# Patient Record
Sex: FEMALE | NOT HISPANIC OR LATINO
[De-identification: names, ages, dates, MRNs, and addresses within clinical notes are randomized per-mention and may not be internally consistent; named-entity substitution may affect disease eponyms.]

---

## 2018-06-26 ENCOUNTER — APPOINTMENT (OUTPATIENT)
Dept: OBGYN | Facility: CLINIC | Age: 73
End: 2018-06-26

## 2021-11-28 ENCOUNTER — TRANSCRIPTION ENCOUNTER (OUTPATIENT)
Age: 76
End: 2021-11-28

## 2024-06-29 ENCOUNTER — INPATIENT (INPATIENT)
Facility: HOSPITAL | Age: 79
LOS: 11 days | Discharge: NURSING, REHAB OR LONG-TERM CARE | DRG: 66 | End: 2024-07-11
Attending: STUDENT IN AN ORGANIZED HEALTH CARE EDUCATION/TRAINING PROGRAM | Admitting: GENERAL ACUTE CARE HOSPITAL
Payer: MEDICARE

## 2024-06-29 ENCOUNTER — EMERGENCY (EMERGENCY)
Facility: HOSPITAL | Age: 79
LOS: 1 days | Discharge: ANOTHER HOSPITAL | End: 2024-06-29
Attending: STUDENT IN AN ORGANIZED HEALTH CARE EDUCATION/TRAINING PROGRAM | Admitting: STUDENT IN AN ORGANIZED HEALTH CARE EDUCATION/TRAINING PROGRAM
Payer: MEDICARE

## 2024-06-29 VITALS
HEART RATE: 109 BPM | SYSTOLIC BLOOD PRESSURE: 152 MMHG | DIASTOLIC BLOOD PRESSURE: 82 MMHG | RESPIRATION RATE: 18 BRPM | OXYGEN SATURATION: 95 %

## 2024-06-29 VITALS
OXYGEN SATURATION: 96 % | WEIGHT: 166.89 LBS | TEMPERATURE: 100 F | SYSTOLIC BLOOD PRESSURE: 116 MMHG | DIASTOLIC BLOOD PRESSURE: 83 MMHG | RESPIRATION RATE: 26 BRPM | HEART RATE: 138 BPM | HEIGHT: 62 IN

## 2024-06-29 VITALS — HEIGHT: 62 IN

## 2024-06-29 DIAGNOSIS — I25.10 ATHEROSCLEROTIC HEART DISEASE OF NATIVE CORONARY ARTERY WITHOUT ANGINA PECTORIS: ICD-10-CM

## 2024-06-29 DIAGNOSIS — I10 ESSENTIAL (PRIMARY) HYPERTENSION: ICD-10-CM

## 2024-06-29 DIAGNOSIS — R79.89 OTHER SPECIFIED ABNORMAL FINDINGS OF BLOOD CHEMISTRY: ICD-10-CM

## 2024-06-29 DIAGNOSIS — I62.9 NONTRAUMATIC INTRACRANIAL HEMORRHAGE, UNSPECIFIED: ICD-10-CM

## 2024-06-29 LAB
A1C WITH ESTIMATED AVERAGE GLUCOSE RESULT: 5.8 % — HIGH (ref 4–5.6)
ALBUMIN SERPL ELPH-MCNC: 3.6 G/DL — SIGNIFICANT CHANGE UP (ref 3.3–5)
ALBUMIN SERPL ELPH-MCNC: 3.9 G/DL — SIGNIFICANT CHANGE UP (ref 3.3–5.2)
ALP SERPL-CCNC: 108 U/L — SIGNIFICANT CHANGE UP (ref 40–120)
ALP SERPL-CCNC: 98 U/L — SIGNIFICANT CHANGE UP (ref 40–120)
ALT FLD-CCNC: 15 U/L — SIGNIFICANT CHANGE UP
ALT FLD-CCNC: 22 U/L — SIGNIFICANT CHANGE UP (ref 12–78)
AMMONIA BLD-MCNC: 16 UMOL/L — SIGNIFICANT CHANGE UP (ref 11–55)
AMMONIA BLD-MCNC: 71 UMOL/L — HIGH (ref 11–32)
ANION GAP SERPL CALC-SCNC: 12 MMOL/L — SIGNIFICANT CHANGE UP (ref 5–17)
ANION GAP SERPL CALC-SCNC: 12 MMOL/L — SIGNIFICANT CHANGE UP (ref 5–17)
APAP SERPL-MCNC: <2 UG/ML — LOW (ref 10–30)
APPEARANCE UR: CLEAR — SIGNIFICANT CHANGE UP
APTT BLD: 32.9 SEC — SIGNIFICANT CHANGE UP (ref 24.5–35.6)
APTT BLD: 33.1 SEC — SIGNIFICANT CHANGE UP (ref 24.5–35.6)
AST SERPL-CCNC: 26 U/L — SIGNIFICANT CHANGE UP (ref 15–37)
AST SERPL-CCNC: 33 U/L — HIGH
BASOPHILS # BLD AUTO: 0.04 K/UL — SIGNIFICANT CHANGE UP (ref 0–0.2)
BASOPHILS # BLD AUTO: 0.06 K/UL — SIGNIFICANT CHANGE UP (ref 0–0.2)
BASOPHILS NFR BLD AUTO: 0.3 % — SIGNIFICANT CHANGE UP (ref 0–2)
BASOPHILS NFR BLD AUTO: 0.5 % — SIGNIFICANT CHANGE UP (ref 0–2)
BILIRUB SERPL-MCNC: 0.8 MG/DL — SIGNIFICANT CHANGE UP (ref 0.2–1.2)
BILIRUB SERPL-MCNC: 0.8 MG/DL — SIGNIFICANT CHANGE UP (ref 0.4–2)
BILIRUB UR-MCNC: NEGATIVE — SIGNIFICANT CHANGE UP
BLD GP AB SCN SERPL QL: SIGNIFICANT CHANGE UP
BUN SERPL-MCNC: 10 MG/DL — SIGNIFICANT CHANGE UP (ref 7–23)
BUN SERPL-MCNC: 8.3 MG/DL — SIGNIFICANT CHANGE UP (ref 8–20)
CALCIUM SERPL-MCNC: 8.8 MG/DL — SIGNIFICANT CHANGE UP (ref 8.4–10.5)
CALCIUM SERPL-MCNC: 9.4 MG/DL — SIGNIFICANT CHANGE UP (ref 8.5–10.1)
CHLORIDE SERPL-SCNC: 102 MMOL/L — SIGNIFICANT CHANGE UP (ref 96–108)
CHLORIDE SERPL-SCNC: 106 MMOL/L — SIGNIFICANT CHANGE UP (ref 96–108)
CHOLEST SERPL-MCNC: 128 MG/DL — SIGNIFICANT CHANGE UP
CK SERPL-CCNC: 104 U/L — SIGNIFICANT CHANGE UP (ref 26–192)
CO2 SERPL-SCNC: 22 MMOL/L — SIGNIFICANT CHANGE UP (ref 22–31)
CO2 SERPL-SCNC: 23 MMOL/L — SIGNIFICANT CHANGE UP (ref 22–29)
COLOR SPEC: YELLOW — SIGNIFICANT CHANGE UP
CREAT SERPL-MCNC: 0.55 MG/DL — SIGNIFICANT CHANGE UP (ref 0.5–1.3)
CREAT SERPL-MCNC: 0.94 MG/DL — SIGNIFICANT CHANGE UP (ref 0.5–1.3)
DIFF PNL FLD: ABNORMAL
EGFR: 62 ML/MIN/1.73M2 — SIGNIFICANT CHANGE UP
EGFR: 93 ML/MIN/1.73M2 — SIGNIFICANT CHANGE UP
EOSINOPHIL # BLD AUTO: 0 K/UL — SIGNIFICANT CHANGE UP (ref 0–0.5)
EOSINOPHIL # BLD AUTO: 0.24 K/UL — SIGNIFICANT CHANGE UP (ref 0–0.5)
EOSINOPHIL NFR BLD AUTO: 0 % — SIGNIFICANT CHANGE UP (ref 0–6)
EOSINOPHIL NFR BLD AUTO: 2.1 % — SIGNIFICANT CHANGE UP (ref 0–6)
ESTIMATED AVERAGE GLUCOSE: 120 MG/DL — HIGH (ref 68–114)
ETHANOL SERPL-MCNC: <10 MG/DL — SIGNIFICANT CHANGE UP (ref 0–10)
FLUAV AG NPH QL: SIGNIFICANT CHANGE UP
FLUBV AG NPH QL: SIGNIFICANT CHANGE UP
GAS PNL BLDA: SIGNIFICANT CHANGE UP
GLUCOSE SERPL-MCNC: 140 MG/DL — HIGH (ref 70–99)
GLUCOSE SERPL-MCNC: 192 MG/DL — HIGH (ref 70–99)
GLUCOSE UR QL: NEGATIVE MG/DL — SIGNIFICANT CHANGE UP
HCT VFR BLD CALC: 40.7 % — SIGNIFICANT CHANGE UP (ref 34.5–45)
HCT VFR BLD CALC: 44.1 % — SIGNIFICANT CHANGE UP (ref 34.5–45)
HDLC SERPL-MCNC: 60 MG/DL — SIGNIFICANT CHANGE UP
HGB BLD-MCNC: 13.6 G/DL — SIGNIFICANT CHANGE UP (ref 11.5–15.5)
HGB BLD-MCNC: 14 G/DL — SIGNIFICANT CHANGE UP (ref 11.5–15.5)
IMM GRANULOCYTES NFR BLD AUTO: 0.4 % — SIGNIFICANT CHANGE UP (ref 0–0.9)
IMM GRANULOCYTES NFR BLD AUTO: 0.6 % — SIGNIFICANT CHANGE UP (ref 0–0.9)
INR BLD: 0.94 RATIO — SIGNIFICANT CHANGE UP (ref 0.85–1.18)
INR BLD: 1.07 RATIO — SIGNIFICANT CHANGE UP (ref 0.85–1.18)
KETONES UR-MCNC: NEGATIVE MG/DL — SIGNIFICANT CHANGE UP
LACTATE SERPL-SCNC: 3 MMOL/L — HIGH (ref 0.7–2)
LACTATE SERPL-SCNC: 8.8 MMOL/L — CRITICAL HIGH (ref 0.7–2)
LEUKOCYTE ESTERASE UR-ACNC: NEGATIVE — SIGNIFICANT CHANGE UP
LIDOCAIN IGE QN: 45 U/L — SIGNIFICANT CHANGE UP (ref 13–75)
LIPID PNL WITH DIRECT LDL SERPL: 47 MG/DL — SIGNIFICANT CHANGE UP
LYMPHOCYTES # BLD AUTO: 1.18 K/UL — SIGNIFICANT CHANGE UP (ref 1–3.3)
LYMPHOCYTES # BLD AUTO: 4.69 K/UL — HIGH (ref 1–3.3)
LYMPHOCYTES # BLD AUTO: 41.1 % — SIGNIFICANT CHANGE UP (ref 13–44)
LYMPHOCYTES # BLD AUTO: 8.3 % — LOW (ref 13–44)
MAGNESIUM SERPL-MCNC: 1.5 MG/DL — LOW (ref 1.6–2.6)
MAGNESIUM SERPL-MCNC: 1.9 MG/DL — SIGNIFICANT CHANGE UP (ref 1.6–2.6)
MCHC RBC-ENTMCNC: 29.2 PG — SIGNIFICANT CHANGE UP (ref 27–34)
MCHC RBC-ENTMCNC: 29.5 PG — SIGNIFICANT CHANGE UP (ref 27–34)
MCHC RBC-ENTMCNC: 31.7 GM/DL — LOW (ref 32–36)
MCHC RBC-ENTMCNC: 33.4 GM/DL — SIGNIFICANT CHANGE UP (ref 32–36)
MCV RBC AUTO: 88.3 FL — SIGNIFICANT CHANGE UP (ref 80–100)
MCV RBC AUTO: 92.1 FL — SIGNIFICANT CHANGE UP (ref 80–100)
MONOCYTES # BLD AUTO: 0.53 K/UL — SIGNIFICANT CHANGE UP (ref 0–0.9)
MONOCYTES # BLD AUTO: 0.56 K/UL — SIGNIFICANT CHANGE UP (ref 0–0.9)
MONOCYTES NFR BLD AUTO: 3.9 % — SIGNIFICANT CHANGE UP (ref 2–14)
MONOCYTES NFR BLD AUTO: 4.6 % — SIGNIFICANT CHANGE UP (ref 2–14)
NEUTROPHILS # BLD AUTO: 12.34 K/UL — HIGH (ref 1.8–7.4)
NEUTROPHILS # BLD AUTO: 5.83 K/UL — SIGNIFICANT CHANGE UP (ref 1.8–7.4)
NEUTROPHILS NFR BLD AUTO: 51.1 % — SIGNIFICANT CHANGE UP (ref 43–77)
NEUTROPHILS NFR BLD AUTO: 87.1 % — HIGH (ref 43–77)
NITRITE UR-MCNC: NEGATIVE — SIGNIFICANT CHANGE UP
NON HDL CHOLESTEROL: 68 MG/DL — SIGNIFICANT CHANGE UP
NRBC # BLD: 0 /100 WBCS — SIGNIFICANT CHANGE UP (ref 0–0)
NT-PROBNP SERPL-SCNC: 957 PG/ML — HIGH (ref 0–450)
PH UR: 5.5 — SIGNIFICANT CHANGE UP (ref 5–8)
PHOSPHATE SERPL-MCNC: 2.7 MG/DL — SIGNIFICANT CHANGE UP (ref 2.4–4.7)
PLATELET # BLD AUTO: 256 K/UL — SIGNIFICANT CHANGE UP (ref 150–400)
PLATELET # BLD AUTO: 305 K/UL — SIGNIFICANT CHANGE UP (ref 150–400)
POTASSIUM SERPL-MCNC: 3.7 MMOL/L — SIGNIFICANT CHANGE UP (ref 3.5–5.3)
POTASSIUM SERPL-MCNC: 3.9 MMOL/L — SIGNIFICANT CHANGE UP (ref 3.5–5.3)
POTASSIUM SERPL-SCNC: 3.7 MMOL/L — SIGNIFICANT CHANGE UP (ref 3.5–5.3)
POTASSIUM SERPL-SCNC: 3.9 MMOL/L — SIGNIFICANT CHANGE UP (ref 3.5–5.3)
PROT SERPL-MCNC: 7.9 G/DL — SIGNIFICANT CHANGE UP (ref 6.6–8.7)
PROT SERPL-MCNC: 8.5 G/DL — HIGH (ref 6–8.3)
PROT UR-MCNC: 300 MG/DL
PROTHROM AB SERPL-ACNC: 11 SEC — SIGNIFICANT CHANGE UP (ref 9.5–13)
PROTHROM AB SERPL-ACNC: 11.8 SEC — SIGNIFICANT CHANGE UP (ref 9.5–13)
RBC # BLD: 4.61 M/UL — SIGNIFICANT CHANGE UP (ref 3.8–5.2)
RBC # BLD: 4.79 M/UL — SIGNIFICANT CHANGE UP (ref 3.8–5.2)
RBC # FLD: 13.7 % — SIGNIFICANT CHANGE UP (ref 10.3–14.5)
RBC # FLD: 13.8 % — SIGNIFICANT CHANGE UP (ref 10.3–14.5)
RSV RNA NPH QL NAA+NON-PROBE: SIGNIFICANT CHANGE UP
SALICYLATES SERPL-MCNC: <1.7 MG/DL — LOW (ref 2.8–20)
SARS-COV-2 RNA SPEC QL NAA+PROBE: SIGNIFICANT CHANGE UP
SODIUM SERPL-SCNC: 137 MMOL/L — SIGNIFICANT CHANGE UP (ref 135–145)
SODIUM SERPL-SCNC: 140 MMOL/L — SIGNIFICANT CHANGE UP (ref 135–145)
SP GR SPEC: 1.01 — SIGNIFICANT CHANGE UP (ref 1–1.03)
TRIGL SERPL-MCNC: 105 MG/DL — SIGNIFICANT CHANGE UP
TROPONIN I, HIGH SENSITIVITY RESULT: 308.6 NG/L — HIGH
TROPONIN I, HIGH SENSITIVITY RESULT: 67.7 NG/L — HIGH
TROPONIN T, HIGH SENSITIVITY RESULT: 113 NG/L — HIGH (ref 0–51)
TROPONIN T, HIGH SENSITIVITY RESULT: 119 NG/L — HIGH (ref 0–51)
TSH SERPL-MCNC: 2.35 UIU/ML — SIGNIFICANT CHANGE UP (ref 0.36–3.74)
UROBILINOGEN FLD QL: 0.2 MG/DL — SIGNIFICANT CHANGE UP (ref 0.2–1)
WBC # BLD: 11.42 K/UL — HIGH (ref 3.8–10.5)
WBC # BLD: 14.18 K/UL — HIGH (ref 3.8–10.5)
WBC # FLD AUTO: 11.42 K/UL — HIGH (ref 3.8–10.5)
WBC # FLD AUTO: 14.18 K/UL — HIGH (ref 3.8–10.5)

## 2024-06-29 PROCEDURE — 31500 INSERT EMERGENCY AIRWAY: CPT

## 2024-06-29 PROCEDURE — 83605 ASSAY OF LACTIC ACID: CPT

## 2024-06-29 PROCEDURE — 82550 ASSAY OF CK (CPK): CPT

## 2024-06-29 PROCEDURE — 93010 ELECTROCARDIOGRAM REPORT: CPT

## 2024-06-29 PROCEDURE — 99291 CRITICAL CARE FIRST HOUR: CPT | Mod: 25

## 2024-06-29 PROCEDURE — 87637 SARSCOV2&INF A&B&RSV AMP PRB: CPT

## 2024-06-29 PROCEDURE — 70450 CT HEAD/BRAIN W/O DYE: CPT | Mod: 26,MC

## 2024-06-29 PROCEDURE — 70498 CT ANGIOGRAPHY NECK: CPT | Mod: 26

## 2024-06-29 PROCEDURE — 87086 URINE CULTURE/COLONY COUNT: CPT

## 2024-06-29 PROCEDURE — 99292 CRITICAL CARE ADDL 30 MIN: CPT | Mod: 25

## 2024-06-29 PROCEDURE — 81001 URINALYSIS AUTO W/SCOPE: CPT

## 2024-06-29 PROCEDURE — 96372 THER/PROPH/DIAG INJ SC/IM: CPT | Mod: XU

## 2024-06-29 PROCEDURE — 36415 COLL VENOUS BLD VENIPUNCTURE: CPT

## 2024-06-29 PROCEDURE — 71045 X-RAY EXAM CHEST 1 VIEW: CPT | Mod: 26,76

## 2024-06-29 PROCEDURE — 83690 ASSAY OF LIPASE: CPT

## 2024-06-29 PROCEDURE — 82140 ASSAY OF AMMONIA: CPT

## 2024-06-29 PROCEDURE — 99291 CRITICAL CARE FIRST HOUR: CPT

## 2024-06-29 PROCEDURE — 96374 THER/PROPH/DIAG INJ IV PUSH: CPT

## 2024-06-29 PROCEDURE — 93005 ELECTROCARDIOGRAM TRACING: CPT

## 2024-06-29 PROCEDURE — 84484 ASSAY OF TROPONIN QUANT: CPT

## 2024-06-29 PROCEDURE — 80307 DRUG TEST PRSMV CHEM ANLYZR: CPT

## 2024-06-29 PROCEDURE — 70450 CT HEAD/BRAIN W/O DYE: CPT | Mod: MC

## 2024-06-29 PROCEDURE — 85610 PROTHROMBIN TIME: CPT

## 2024-06-29 PROCEDURE — 87040 BLOOD CULTURE FOR BACTERIA: CPT

## 2024-06-29 PROCEDURE — 96375 TX/PRO/DX INJ NEW DRUG ADDON: CPT

## 2024-06-29 PROCEDURE — 95720 EEG PHY/QHP EA INCR W/VEEG: CPT

## 2024-06-29 PROCEDURE — 71045 X-RAY EXAM CHEST 1 VIEW: CPT | Mod: 26

## 2024-06-29 PROCEDURE — 83735 ASSAY OF MAGNESIUM: CPT

## 2024-06-29 PROCEDURE — 70450 CT HEAD/BRAIN W/O DYE: CPT | Mod: 26,59,77,MC

## 2024-06-29 PROCEDURE — 85730 THROMBOPLASTIN TIME PARTIAL: CPT

## 2024-06-29 PROCEDURE — 84443 ASSAY THYROID STIM HORMONE: CPT

## 2024-06-29 PROCEDURE — 82962 GLUCOSE BLOOD TEST: CPT

## 2024-06-29 PROCEDURE — 71045 X-RAY EXAM CHEST 1 VIEW: CPT

## 2024-06-29 PROCEDURE — 70496 CT ANGIOGRAPHY HEAD: CPT | Mod: 26

## 2024-06-29 PROCEDURE — 83880 ASSAY OF NATRIURETIC PEPTIDE: CPT

## 2024-06-29 PROCEDURE — 99222 1ST HOSP IP/OBS MODERATE 55: CPT

## 2024-06-29 PROCEDURE — 85025 COMPLETE CBC W/AUTO DIFF WBC: CPT

## 2024-06-29 PROCEDURE — 80053 COMPREHEN METABOLIC PANEL: CPT

## 2024-06-29 PROCEDURE — 96376 TX/PRO/DX INJ SAME DRUG ADON: CPT

## 2024-06-29 RX ORDER — HYDRALAZINE HYDROCHLORIDE 50 MG/1
10 TABLET ORAL
Refills: 0 | Status: DISCONTINUED | OUTPATIENT
Start: 2024-06-29 | End: 2024-07-11

## 2024-06-29 RX ORDER — SODIUM CHLORIDE 0.9 % (FLUSH) 0.9 %
1000 SYRINGE (ML) INJECTION
Refills: 0 | Status: DISCONTINUED | OUTPATIENT
Start: 2024-06-29 | End: 2024-07-01

## 2024-06-29 RX ORDER — ETOMIDATE 2 MG/ML
20 INJECTION, SOLUTION INTRAVENOUS ONCE
Refills: 0 | Status: COMPLETED | OUTPATIENT
Start: 2024-06-29 | End: 2024-06-29

## 2024-06-29 RX ORDER — LABETALOL HYDROCHLORIDE 300 MG/1
10 TABLET ORAL ONCE
Refills: 0 | Status: COMPLETED | OUTPATIENT
Start: 2024-06-29 | End: 2024-06-29

## 2024-06-29 RX ORDER — PANTOPRAZOLE SODIUM 40 MG/10ML
40 INJECTION, POWDER, FOR SOLUTION INTRAVENOUS DAILY
Refills: 0 | Status: DISCONTINUED | OUTPATIENT
Start: 2024-06-29 | End: 2024-07-01

## 2024-06-29 RX ORDER — LORAZEPAM 0.5 MG
2 TABLET ORAL ONCE
Refills: 0 | Status: DISCONTINUED | OUTPATIENT
Start: 2024-06-29 | End: 2024-06-29

## 2024-06-29 RX ORDER — SODIUM CHLORIDE 0.9 % (FLUSH) 0.9 %
500 SYRINGE (ML) INJECTION ONCE
Refills: 0 | Status: COMPLETED | OUTPATIENT
Start: 2024-06-29 | End: 2024-06-29

## 2024-06-29 RX ORDER — LEVETIRACETAM 100 MG/ML
1000 INJECTION INTRAVENOUS EVERY 12 HOURS
Refills: 0 | Status: DISCONTINUED | OUTPATIENT
Start: 2024-06-29 | End: 2024-07-01

## 2024-06-29 RX ORDER — LEVETIRACETAM 100 MG/ML
1500 INJECTION INTRAVENOUS ONCE
Refills: 0 | Status: DISCONTINUED | OUTPATIENT
Start: 2024-06-29 | End: 2024-06-29

## 2024-06-29 RX ORDER — SENNOSIDES 8.6 MG
10 TABLET ORAL AT BEDTIME
Refills: 0 | Status: DISCONTINUED | OUTPATIENT
Start: 2024-06-29 | End: 2024-06-29

## 2024-06-29 RX ORDER — LACTULOSE 10 G/15ML
200 SOLUTION ORAL ONCE
Refills: 0 | Status: COMPLETED | OUTPATIENT
Start: 2024-06-29 | End: 2024-06-29

## 2024-06-29 RX ORDER — LABETALOL HYDROCHLORIDE 300 MG/1
10 TABLET ORAL
Refills: 0 | Status: DISCONTINUED | OUTPATIENT
Start: 2024-06-29 | End: 2024-07-11

## 2024-06-29 RX ORDER — ACETAMINOPHEN 325 MG
1000 TABLET ORAL ONCE
Refills: 0 | Status: COMPLETED | OUTPATIENT
Start: 2024-06-29 | End: 2024-06-29

## 2024-06-29 RX ORDER — HALOPERIDOL DECANOATE 100 MG/ML
5 VIAL (ML) INTRAMUSCULAR ONCE
Refills: 0 | Status: COMPLETED | OUTPATIENT
Start: 2024-06-29 | End: 2024-06-29

## 2024-06-29 RX ORDER — NICARDIPINE HYDROCHLORIDE 0.1 MG/ML
5 INJECTION, SOLUTION INTRAVENOUS
Qty: 40 | Refills: 0 | Status: DISCONTINUED | OUTPATIENT
Start: 2024-06-29 | End: 2024-07-02

## 2024-06-29 RX ORDER — PROPOFOL 10 MG/ML
30 INJECTION, EMULSION INTRAVENOUS
Qty: 1000 | Refills: 0 | Status: DISCONTINUED | OUTPATIENT
Start: 2024-06-29 | End: 2024-06-30

## 2024-06-29 RX ORDER — SUCCINYLCHOLINE CHLORIDE 140 MG/7ML
100 SYRINGE (ML) INTRAVENOUS ONCE
Refills: 0 | Status: COMPLETED | OUTPATIENT
Start: 2024-06-29 | End: 2024-06-29

## 2024-06-29 RX ORDER — NICARDIPINE HYDROCHLORIDE 0.1 MG/ML
5 INJECTION, SOLUTION INTRAVENOUS
Qty: 40 | Refills: 0 | Status: DISCONTINUED | OUTPATIENT
Start: 2024-06-29 | End: 2024-06-29

## 2024-06-29 RX ORDER — DEXMEDETOMIDINE HYDROCHLORIDE 4 UG/ML
0.2 INJECTION, SOLUTION INTRAVENOUS
Qty: 200 | Refills: 0 | Status: DISCONTINUED | OUTPATIENT
Start: 2024-06-29 | End: 2024-07-01

## 2024-06-29 RX ORDER — FENTANYL CITRATE 50 UG/ML
100 INJECTION, SOLUTION INTRAMUSCULAR; INTRAVENOUS ONCE
Refills: 0 | Status: DISCONTINUED | OUTPATIENT
Start: 2024-06-29 | End: 2024-06-29

## 2024-06-29 RX ORDER — LORAZEPAM 0.5 MG
1 TABLET ORAL ONCE
Refills: 0 | Status: DISCONTINUED | OUTPATIENT
Start: 2024-06-29 | End: 2024-06-29

## 2024-06-29 RX ORDER — SENNOSIDES 8.6 MG
2 TABLET ORAL AT BEDTIME
Refills: 0 | Status: DISCONTINUED | OUTPATIENT
Start: 2024-06-29 | End: 2024-06-29

## 2024-06-29 RX ORDER — SENNOSIDES 8.6 MG
10 TABLET ORAL AT BEDTIME
Refills: 0 | Status: DISCONTINUED | OUTPATIENT
Start: 2024-06-29 | End: 2024-07-01

## 2024-06-29 RX ADMIN — Medication 15 MILLILITER(S): at 17:02

## 2024-06-29 RX ADMIN — LEVETIRACETAM 1000 MILLIGRAM(S): 100 INJECTION INTRAVENOUS at 15:56

## 2024-06-29 RX ADMIN — LABETALOL HYDROCHLORIDE 10 MILLIGRAM(S): 300 TABLET ORAL at 16:30

## 2024-06-29 RX ADMIN — Medication 1 APPLICATION(S): at 14:30

## 2024-06-29 RX ADMIN — DEXMEDETOMIDINE HYDROCHLORIDE 3.87 MICROGRAM(S)/KG/HR: 4 INJECTION, SOLUTION INTRAVENOUS at 23:30

## 2024-06-29 RX ADMIN — Medication 1 MILLIGRAM(S): at 11:07

## 2024-06-29 RX ADMIN — LABETALOL HYDROCHLORIDE 10 MILLIGRAM(S): 300 TABLET ORAL at 09:11

## 2024-06-29 RX ADMIN — Medication 100 MILLIGRAM(S): at 12:37

## 2024-06-29 RX ADMIN — FENTANYL CITRATE 100 MICROGRAM(S): 50 INJECTION, SOLUTION INTRAMUSCULAR; INTRAVENOUS at 12:53

## 2024-06-29 RX ADMIN — FENTANYL CITRATE 100 MICROGRAM(S): 50 INJECTION, SOLUTION INTRAMUSCULAR; INTRAVENOUS at 13:00

## 2024-06-29 RX ADMIN — Medication 2 MILLIGRAM(S): at 07:08

## 2024-06-29 RX ADMIN — DEXMEDETOMIDINE HYDROCHLORIDE 3.87 MICROGRAM(S)/KG/HR: 4 INJECTION, SOLUTION INTRAVENOUS at 21:14

## 2024-06-29 RX ADMIN — PROPOFOL 15.3 MICROGRAM(S)/KG/MIN: 10 INJECTION, EMULSION INTRAVENOUS at 18:51

## 2024-06-29 RX ADMIN — ETOMIDATE 20 MILLIGRAM(S): 2 INJECTION, SOLUTION INTRAVENOUS at 12:37

## 2024-06-29 RX ADMIN — Medication 1000 MILLILITER(S): at 23:57

## 2024-06-29 RX ADMIN — NICARDIPINE HYDROCHLORIDE 25 MG/HR: 0.1 INJECTION, SOLUTION INTRAVENOUS at 09:38

## 2024-06-29 RX ADMIN — NICARDIPINE HYDROCHLORIDE 25 MG/HR: 0.1 INJECTION, SOLUTION INTRAVENOUS at 12:52

## 2024-06-29 RX ADMIN — LEVETIRACETAM 1500 MILLIGRAM(S): 100 INJECTION INTRAVENOUS at 10:40

## 2024-06-29 RX ADMIN — Medication 500 MILLILITER(S): at 07:36

## 2024-06-29 RX ADMIN — Medication 5 MILLIGRAM(S): at 07:31

## 2024-06-29 RX ADMIN — Medication 10 MILLILITER(S): at 21:22

## 2024-06-29 RX ADMIN — LACTULOSE 200 GRAM(S): 10 SOLUTION ORAL at 09:38

## 2024-06-29 RX ADMIN — Medication 400 MILLIGRAM(S): at 17:59

## 2024-06-29 RX ADMIN — PROPOFOL 15.3 MICROGRAM(S)/KG/MIN: 10 INJECTION, EMULSION INTRAVENOUS at 12:52

## 2024-06-29 RX ADMIN — Medication 75 MILLILITER(S): at 16:48

## 2024-06-29 RX ADMIN — Medication 1000 MILLIGRAM(S): at 18:30

## 2024-06-29 NOTE — CONSULT NOTE ADULT - PROBLEM SELECTOR RECOMMENDATION 3
- hx of HTN   - SBP goal <160 per neurosurgery  - BP management per neurosurgery   - s/p cardene drip

## 2024-06-29 NOTE — H&P ADULT - NSHPLABSRESULTS_GEN_ALL_CORE
.  LABS:                         13.6   14.18 )-----------( 256      ( 29 Jun 2024 12:07 )             40.7     06-29    137  |  102  |  8.3  ----------------------------<  140<H>  3.9   |  23.0  |  0.55    Ca    8.8      29 Jun 2024 12:07  Mg     1.9     06-29    TPro  7.9  /  Alb  3.9  /  TBili  0.8  /  DBili  x   /  AST  33<H>  /  ALT  15  /  AlkPhos  98  06-29    PT/INR - ( 29 Jun 2024 12:07 )   PT: 11.8 sec;   INR: 1.07 ratio         PTT - ( 29 Jun 2024 12:07 )  PTT:32.9 sec  Urinalysis Basic - ( 29 Jun 2024 12:07 )    Color: x / Appearance: x / SG: x / pH: x  Gluc: 140 mg/dL / Ketone: x  / Bili: x / Urobili: x   Blood: x / Protein: x / Nitrite: x   Leuk Esterase: x / RBC: x / WBC x   Sq Epi: x / Non Sq Epi: x / Bacteria: x        Lactate, Blood: 3.0 mmol/L (06-29 @ 10:40)  Lactate, Blood: 8.8 mmol/L (06-29 @ 07:15)      RADIOLOGY, EKG & ADDITIONAL TESTS: Reviewed.

## 2024-06-29 NOTE — CONSULT NOTE ADULT - SUBJECTIVE AND OBJECTIVE BOX
[facility]                                              CARDIOLOGY AT [place] [address]                                             Telephone: [phone]. Fax:[phone]                                                       CARDIOLOGY CONSULTATION NOTE                                                                                             History obtained by: Patient and medical record  Community Cardiologist: none    obtained: Yes [  ] No [x  ]  Reason for Consultation: elevated troponin   Available out pt records reviewed: Yes [ x ] No [  ]    Chief complaint:    Patient is a 79y old  Female who presents with a chief complaint of IPH ?seizure (29 Jun 2024 14:08)      HPI: Due to current medical condition, subjective information was not able to be obtained from the patient. History was obtained, to the extent possible, from review of the chart and collateral sources of information. Daughter at the bedside providing information.    79F with hx of HTN, HLD and CAD (had angiogram in 2000 but family is unsure of what kind of stent it is), takes ASA and crestor at home, who presented to [facility] with AMS and urinary incontinence and her last known well approximately 530am prior to arrival. CTH at [place] revealed R parietal lobe hematoma. Patient was transferred to [facility] for further management. Of note, patient recieved 3mg ativan, haldol and 1g keppra prior to arrival at [facility] ED for both agitation and potential seizures. Once in ED, ED providers concerned for patient's airway as she was gargling with respirations. Decision made by ED staff to intubate patient for airway protection. ROS unable to be obtained as patient was in distress altered on exam.     CARDIAC TESTING   ECHO: none     STRESS: none     CATH:  none     ELECTROPHYSIOLOGY: none     PAST MEDICAL HISTORY  PAST SURGICAL HISTORY      SOCIAL HISTORY:  Denies smoking/alcohol/drugs  CIGARETTES:     ALCOHOL:  DRUGS:    FAMILY HISTORY:    Family History of Cardiovascular Disease:  Yes [  ] No [  x]  Coronary Artery Disease in first degree relative: Yes [  ] No [  x]  Sudden Cardiac Death in First degree relative: Yes [  ] No [ x ]    HOME MEDICATIONS:    CURRENT CARDIAC MEDICATIONS:    CURRENT OTHER MEDICATIONS:  levETIRAcetam   Injectable 1000 milliGRAM(s) IV Push every 12 hours  propofol Infusion 30 MICROgram(s)/kG/Min (15.3 mL/Hr) IV Continuous <Continuous>  pantoprazole  Injectable 40 milliGRAM(s) IV Push daily  senna Syrup 10 milliLiter(s) Oral at bedtime  chlorhexidine 0.12% Liquid 15 milliLiter(s) Oral Mucosa every 12 hours  chlorhexidine 4% Liquid 1 Application(s) Topical <User Schedule>  sodium chloride 0.9%. 1000 milliLiter(s) (75 mL/Hr) IV Continuous <Continuous>    ALLERGIES:   penicillins (Unknown)    REVIEW OF SYMPTOMS:   unable to obtain ROS due to intubation and sedation.     VITAL SIGNS:  T(C): 37.9 (06-29-24 @ 16:15), Max: 37.9 (06-29-24 @ 15:00)  T(F): 100.2 (06-29-24 @ 16:15), Max: 100.2 (06-29-24 @ 15:00)  HR: 92 (06-29-24 @ 16:15) (72 - 882)  BP: 148/95 (06-29-24 @ 16:15) (92/50 - 236/137)  RR: 16 (06-29-24 @ 16:15) (15 - 26)  SpO2: 100% (06-29-24 @ 16:15) (91% - 100%)    INTAKE AND OUTPUT:     06-29 @ 07:01  -  06-29 @ 16:34  --------------------------------------------------------  IN: 105.6 mL / OUT: 375 mL / NET: -269.4 mL    PHYSICAL EXAM:  Constitutional: Comfortable . No acute distress.   HEENT: Atraumatic and normocephalic , neck is supple . no JVD. No carotid bruit.  CNS: A&Ox3. No focal deficits.   Respiratory: CTAB, unlabored   Cardiovascular: RRR normal s1 s2. No murmur. No rubs or gallop.  Gastrointestinal: Soft, non-tender. +Bowel sounds.   Extremities: 2+ Peripheral Pulses, No clubbing, cyanosis, or edema  Psychiatric: Calm . no agitation.   Skin: Warm and dry, no ulcers on extremities     LABS:  ( 29 Jun 2024 07:15 )  Troponin T  X    ,  CPK  104  , CKMB  X    , BNP X                   13.6   14.18 )-----------( 256      ( 29 Jun 2024 12:07 )             40.7     06-29    137  |  102  |  8.3  ----------------------------<  140<H>  3.9   |  23.0  |  0.55    Ca    8.8      29 Jun 2024 12:07  Phos  2.7     06-29  Mg     1.5     06-29    TPro  7.9  /  Alb  3.9  /  TBili  0.8  /  DBili  x   /  AST  33<H>  /  ALT  15  /  AlkPhos  98  06-29    PT/INR - ( 29 Jun 2024 12:07 )   PT: 11.8 sec;   INR: 1.07 ratio       PTT - ( 29 Jun 2024 12:07 )  PTT:32.9 sec  Urinalysis Basic - ( 29 Jun 2024 12:07 )    Color: x / Appearance: x / SG: x / pH: x  Gluc: 140 mg/dL / Ketone: x  / Bili: x / Urobili: x   Blood: x / Protein: x / Nitrite: x   Leuk Esterase: x / RBC: x / WBC x   Sq Epi: x / Non Sq Epi: x / Bacteria: x      06-29-24 @ 14:50  CHolesterol: 128,  HDL: 60,  LDL: 47, Triglycerides: 105     Thyroid Stimulating Hormone, Serum: 2.35 uIU/mL (06-29-24 @ 07:15)      INTERPRETATION OF TELEMETRY: NSR     ECG: NSR   Prior ECG: Yes [ x ] No [  ]    RADIOLOGY & ADDITIONAL STUDIES:    X-ray:    CT scan: < from: CT Angio Neck w/ IV Cont (06.29.24 @ 13:50) >  Impression: Age-appropriate involutional and ischemic gliotic changes. No   change in right parietal parenchymal hemorrhage since 7:42 AM. Normal CTA   of the head and neck.    < end of copied text >    MRI:   US:

## 2024-06-29 NOTE — ED ADULT NURSE NOTE - OBJECTIVE STATEMENT
Pt received in CC as transfer from [place] with bleed. As per EMS, pt was A&Ox1 at [place] and is now A&Ox0 presenting lethargic, responsive to verbal stimuli. En route pt received 1 mg ativan IV en route due to restlessness and agitation. Neuro team and MD Overton at bedside. As per EMS, pt had episode of seziures at home, no hx of seizures in past, and family brought pt to hospital, found to be hypertensive. Pt received on jena gtt @ 2.5 via alaris pump. Pt placed on CM in ST with . Side rails in place. Pt received in CC as transfer from [place] with bleed. As per EMS, pt was A&Ox1 at [place] and is now A&Ox0 presenting lethargic, responsive to painful stimuli. En route pt received 1 mg ativan IV en route due to restlessness and agitation. Neuro team and MD Overton at bedside. As per EMS, pt had episode of seziures at home, no hx of seizures in past, and family brought pt to hospital, found to be hypertensive. LKW 0530 this am. Pt received on cardene gtt @ 2.5 via alaris pump. Pt placed on CM in ST with . Side rails in place. Family on way to visit.

## 2024-06-29 NOTE — ED PROVIDER NOTE - WR INTERPRETATION 1
post intubation; Right mainstem with some atelectasis of left lung; improved on 1:08pm film, tube withdrawn, better aeration of left

## 2024-06-29 NOTE — CONSULT NOTE ADULT - ASSESSMENT
78 y/o with hx of HTN, HLD, CAD s/p stent in 2000 (family unsure as to what type of stent it is), takes ASA and Crestor presented to [facility] with AMS and urinary incontinence where LKW was 5:30 AM prior to arrival. CTH in [place] revealed R parietal lobe hematoma. Pt transferred to [facility] for further management. Once arrival in [facility] ED, patient was gargling with respirations, so pt was intubated for airway protection. Cardiology consulted for elevated troponin.

## 2024-06-29 NOTE — CONSULT NOTE ADULT - PROBLEM SELECTOR RECOMMENDATION 2
- hx of CAD s/p stent in 2000 (unsure of what type of stent)   - Cholesterol, triglycerides, and HDL within normal range   - on ASA at home, resume ASA once cleared by neurosurgery   - resume statin when able

## 2024-06-29 NOTE — ED PROVIDER NOTE - PHYSICAL EXAMINATION
General:  obtunded with nonpurposeful movement  Head: NC, AT  EENT:  PERRLA  Cardiac:  tachycardic but regular, no apparent murmurs, no lower extremity edema  Respiratory:  transmitted upper airway sounds with secretions,  lungs are CTABL,  nonhypoxic on room air  Abdomen: soft, ND, NT, nonperitonitic  MSK/Vascular: soft compartments, warm extremities, 2+ peripheral pulses  Neuro:  arousable to pain only, nonpurposeful movement with intermittent upper extremity rigidity,   not following commands

## 2024-06-29 NOTE — ED ADULT NURSE REASSESSMENT NOTE - NS ED NURSE REASSESS COMMENT FT1
A 2 RN skin check has been performed on admission to ICU with RN witness Mark.  A pressure injury was not identified.  Documentation in the assessment to support findings.

## 2024-06-29 NOTE — CONSULT NOTE ADULT - NS ATTEND AMEND GEN_ALL_CORE FT
78 y/o with hx of HTN, HLD, CAD s/p stent in 2000 (family unsure as to what type of stent it is), takes ASA and Crestor presented to [facility] with AMS and urinary incontinence where LKW was 5:30 AM prior to arrival. CTH in [place] revealed R parietal lobe hematoma. Pt transferred to [facility] for further management. Once arrival in [facility] ED, patient was gargling with respirations, so pt was intubated for airway protection. Cardiology consulted for elevated troponin.         ICH  hx of CAD s/p PCI in 2000  hypertension  hyperlipidemia  obesity      vegetarian patient hx of CAD. unclear whether PCI with POBA or Stent, had redo after 3 months in 2000. unclear, but first CANDY stent was in 1999 in [place], so theoretically can be CANDY if at [facility] in 2000. Unfortunately no charts, and patients cardiologist passed away pre-covid.    Daughter and son bedside.  She examines warm and euvolemic, intubated for airway protection. ICH    reported ongoing shortness of breath over last several months managed by PCP. On crestor, juan j.    Reportedly her BP's at outside facility were in the 200's.    recommendation:  - 2d TTE  - cardiac enzymes in this setting do not indicated NSTEMI. Troponin I is sensitive to cardiac and neurologic pathology. Troponin T aka hStrop is sensitive to cardiac and these 2 values do not have a Delta change >20% indicated acute active cardiac process.  - check proBNP, replace electrolytes  - not candidate for invasive cardiac procedure currently as we use heparin products and she has active intracranial hemorrhage. daughter and son agree  - we will follow 2d TTE when able. LDL goal <70 and at goal  - telemetry monitoring: sinus rhythm without ischemic changes    we will follow

## 2024-06-29 NOTE — H&P ADULT - ASSESSMENT
79F with MHx significant for HTN, HLD and CAD s/p stent who takes ASA presented to [facility] this morning with AMS and urinary incontinence and her last known well approximately 530am. CTH at [place] revealed R parietal lobe hematoma. Patient was transferred to [facility] for further management. Patient intubated in ED for airway protection.       Neuro:  	Q1 hour Neuro checks, Q1 hour Vitals  	HOB 30 degrees, Neck midline position  	Maintain normothermia, PO acetaminophen for temp>38 C or pain  	6 hour CTH showing stable bleed, CTA Head and Neck NORMAL as per radiology report. no neurosurgical intervention, neurosurgery signed off                  place on EEG   	Continue AED: Keppra 1 g BID   	Pain management: Tylenol for Pain   	Sedation: Propofol may transition to prece dex in not in status   	Turn and Position Q2  /  Activity ad frida, with assistance  	  CV:  	troponin increasing most probably demand ischemia, trend, cardiology consulted to role out ACS                  SBP Goal<160, Cardene gtt and PRN medications as needed  	Access: PIV   	    Pulm:  	Mode: AC/ CMV (Assist Control/ Continuous Mandatory Ventilation)    RR (machine): 18  TV (machine): 450  FiO2: 40  PEEP: 6    	maintain Spo2>92%                  SBT when patient roshni to tolerate, titrate FIO2 to minimal setting   	Chest PT, OOB, Pulmonary Toilet    GI:  	Nutrition: NPO  	GI prophylaxis: protonix   	Bowel regimen: senna Miralax  	  Gu:                  Pure-wick  	I&O Q1 hour  	Monitor Electrolytes & Renal Function    Heme:  	Monitor H&H  	Chemical DVT prophylaxis:   	  		* Chemical DVT prophylaxis is contraindicated due to risk of bleeding  	Mechanical DVT Prophylaxis: Maintain B/L LE sequential compression devices  	Lower Extremity Doppler   		*  	  ID:  	Monitor WBC and Temperature                  Follow cultures: blood, UA,    		*    Endo  	Monitor BGL, maintain <180  	NINFA   		100-150 no correction  		150-200  2units  		200-250  4units  		250-300	 6units  		300-350  8units  		350-400  10units  		400+	12units         CODE STATUS: Full Code  DISPOSITION: monitor in NSICU      Case D/w Dr. Mcwilliams

## 2024-06-29 NOTE — ED ADULT NURSE REASSESSMENT NOTE - NS ED NURSE REASSESS COMMENT FT1
daughter at bedside speaking [facility] language, [facility]. Pt remains minimally responsive, able to tell daughter she is in no pain.

## 2024-06-29 NOTE — CONSULT NOTE ADULT - SUBJECTIVE AND OBJECTIVE BOX
HPI:  79F with MHx significant for HTN, HLD and CAD s/p stent who takes ASA presented to [facility] this morning with AMS and urinary incontinence and her last known well approximately 530am. CTH at [place] revealed R parietal lobe hematoma. Patient was transferred to [facility] for further management. Of note, patient recieved 3mg ativan, haldol and 1g keppra prior to arrival at [facility] ED for both agitation and potential seizures. Once in ED, ED providers concerned for patient's airway as she was gargling with respirations. Decision made by ED staff to intubate patient for airway protection. ROS unable to be obtained as patient was in distress altered on exam.   GCS: 12 (E3, V4, M5) prior to intubation, ICH: 1, mRS: 0        REVIEW OF SYSTEMS  As noted in HPI    HOME MEDICATIONS:  Home Medications:    Vital Signs Last 24 Hrs  T(C): 37.6 (2024 06:55), Max: 37.6 (2024 06:55)  T(F): 99.7 (2024 06:55), Max: 99.7 (2024 06:55)  HR: 112 (2024 12:12) (80 - 138)  BP: 137/99 (2024 12:12) (116/83 - 207/94)  BP(mean): 107 (2024 12:12) (107 - 107)  RR: 18 (2024 12:12) (18 - 26)  SpO2: 95% (2024 12:12) (91% - 96%)    Parameters below as of 2024 12:12  Patient On (Oxygen Delivery Method): room air    PHYSICAL EXAM:  GENERAL: writhing around in stretcher  HEAD:  Atraumatic, normocephalic  DRAINS:   ENMT: apparent bite rimma on L side of tongue  TYESHA COMA SCORE: E-3 V-4 M-5=12 which progressed to 11 (E2) by end of exam  MENTAL STATUS: AAO x1 (self); Awake; to noxious stimuli; Only speaks when stimulated with noxious stimuli; not following commands  REFLEXES: PERRL. Corneals intact b/l.   MOTOR: HERNANDES spontaneously, strong, antigravity  SENSATION: unable to assess  COORDINATION: unable to assess  LUNG: BL chest rise, gargling respirations?  HEART: sinus tachycardia on monitor      LABS:                        14.0   11.42 )-----------( 305      ( 2024 07:15 )             44.1         140  |  106  |  10  ----------------------------<  192<H>  3.7   |  22  |  0.94    Ca    9.4      2024 07:15  Mg     1.9         TPro  8.5<H>  /  Alb  3.6  /  TBili  0.8  /  DBili  x   /  AST  26  /  ALT  22  /  AlkPhos  108      PT/INR - ( 2024 07:15 )   PT: 11.0 sec;   INR: 0.94 ratio         PTT - ( 2024 07:15 )  PTT:33.1 sec  Urinalysis Basic - ( 2024 07:41 )    Color: Yellow / Appearance: Clear / S.009 / pH: x  Gluc: x / Ketone: Negative mg/dL  / Bili: Negative / Urobili: 0.2 mg/dL   Blood: x / Protein: 300 mg/dL / Nitrite: Negative   Leuk Esterase: Negative / RBC: 3 /HPF / WBC 4 /HPF   Sq Epi: x / Non Sq Epi: x / Bacteria: x        CULTURES:      RADIOLOGY & ADDITIONAL STUDIES:  < from: CT Head No Cont (24 @ 07:48) >  IMPRESSION:  1. 1.7 cm acute juxtacortical hematoma with surrounding vasogenic edema,   right parietal lobe. Recommend follow-up imaging, as underlying lesion   cannot be excluded.  2. Moderate-severe white matter hypodensities are nonspecific however   statistically reflects chronic microvascular ischemic change.    < end of copied text >     HPI:  79F with MHx significant for HTN, HLD and CAD s/p stent who takes ASA presented to [facility] this morning with AMS and urinary incontinence and her last known well approximately 530am. CTH at [place] revealed R parietal lobe hematoma. Patient was transferred to [facility] for further management. Of note, patient recieved 3mg ativan, haldol and 1g keppra prior to arrival at [facility] ED for both agitation and potential seizures. Once in ED, ED providers concerned for patient's airway as she was gargling with respirations. Decision made by ED staff to intubate patient for airway protection. ROS unable to be obtained as patient was in distress altered on exam.   GCS: 12 (E3, V4, M5) prior to intubation, ICH: 1, mRS: 0        REVIEW OF SYSTEMS  As noted in HPI    HOME MEDICATIONS:  Home Medications:    Vital Signs Last 24 Hrs  T(C): 37.6 (2024 06:55), Max: 37.6 (2024 06:55)  T(F): 99.7 (2024 06:55), Max: 99.7 (2024 06:55)  HR: 112 (2024 12:12) (80 - 138)  BP: 137/99 (2024 12:12) (116/83 - 207/94)  BP(mean): 107 (2024 12:12) (107 - 107)  RR: 18 (2024 12:12) (18 - 26)  SpO2: 95% (2024 12:12) (91% - 96%)    Parameters below as of 2024 12:12  Patient On (Oxygen Delivery Method): room air    PHYSICAL EXAM:  GENERAL: writhing around in stretcher  HEAD:  Atraumatic, normocephalic  ENMT: apparent bite rimma on L side of tongue  TYESHA COMA SCORE: E-3 V-4 M-5=12 which progressed to 11 (E2) by end of exam  MENTAL STATUS: AAO x1 (self); Awake; to noxious stimuli; Only speaks when stimulated with noxious stimuli; not following commands  REFLEXES: PERRL. Corneals intact b/l.   MOTOR: HERNANDES spontaneously, strong, antigravity  SENSATION: unable to assess  COORDINATION: unable to assess  LUNG: BL chest rise, gargling respirations?  HEART: sinus tachycardia on monitor      LABS:                        14.0   11.42 )-----------( 305      ( 2024 07:15 )             44.1         140  |  106  |  10  ----------------------------<  192<H>  3.7   |  22  |  0.94    Ca    9.4      2024 07:15  Mg     1.9         TPro  8.5<H>  /  Alb  3.6  /  TBili  0.8  /  DBili  x   /  AST  26  /  ALT  22  /  AlkPhos  108      PT/INR - ( 2024 07:15 )   PT: 11.0 sec;   INR: 0.94 ratio         PTT - ( 2024 07:15 )  PTT:33.1 sec  Urinalysis Basic - ( 2024 07:41 )    Color: Yellow / Appearance: Clear / S.009 / pH: x  Gluc: x / Ketone: Negative mg/dL  / Bili: Negative / Urobili: 0.2 mg/dL   Blood: x / Protein: 300 mg/dL / Nitrite: Negative   Leuk Esterase: Negative / RBC: 3 /HPF / WBC 4 /HPF   Sq Epi: x / Non Sq Epi: x / Bacteria: x        CULTURES:      RADIOLOGY & ADDITIONAL STUDIES:  < from: CT Head No Cont (24 @ 07:48) >  IMPRESSION:  1. 1.7 cm acute juxtacortical hematoma with surrounding vasogenic edema,   right parietal lobe. Recommend follow-up imaging, as underlying lesion   cannot be excluded.  2. Moderate-severe white matter hypodensities are nonspecific however   statistically reflects chronic microvascular ischemic change.    < end of copied text >     HPI:  79F with MHx significant for HTN, HLD and CAD s/p stent who takes ASA presented to [facility] this morning with AMS and urinary incontinence and her last known well approximately 530am. CTH at [place] revealed R parietal lobe hematoma. Patient was transferred to [facility] for further management. Of note, patient recieved 3mg ativan, haldol and 1g keppra prior to arrival at [facility] ED for both agitation and potential seizures. Once in ED, ED providers concerned for patient's airway as she was gargling with respirations. Decision made by ED staff to intubate patient for airway protection. ROS unable to be obtained as patient was in distress altered on exam.   GCS: 12 (E3, V4, M5) prior to intubation, ICH: 1, mRS: 0        REVIEW OF SYSTEMS  As noted in HPI    HOME MEDICATIONS:  Home Medications:    Vital Signs Last 24 Hrs  T(C): 37.6 (2024 06:55), Max: 37.6 (2024 06:55)  T(F): 99.7 (2024 06:55), Max: 99.7 (2024 06:55)  HR: 112 (2024 12:12) (80 - 138)  BP: 137/99 (2024 12:12) (116/83 - 207/94)  BP(mean): 107 (2024 12:12) (107 - 107)  RR: 18 (2024 12:12) (18 - 26)  SpO2: 95% (2024 12:12) (91% - 96%)    Parameters below as of 2024 12:12  Patient On (Oxygen Delivery Method): room air    PHYSICAL EXAM:  GENERAL: writhing around in stretcher  HEAD:  Atraumatic, normocephalic  ENMT: apparent bite rimma on L side of tongue  TYESHA COMA SCORE: E-3 V-4 M-5=12 which progressed to 11 (E2) by end of exam  MENTAL STATUS: AAO x1 (self); Awake; initially EO to voice which progressed to noxious stimuli; Only speaks when stimulated with noxious stimuli; not following commands  REFLEXES: PERRL. Corneals intact b/l.   MOTOR: HERNANDES spontaneously, strong, antigravity  SENSATION: unable to assess  COORDINATION: unable to assess  LUNG: BL chest rise, gargling respirations?  HEART: sinus tachycardia on monitor      LABS:                        14.0   11.42 )-----------( 305      ( 2024 07:15 )             44.1         140  |  106  |  10  ----------------------------<  192<H>  3.7   |  22  |  0.94    Ca    9.4      2024 07:15  Mg     1.9         TPro  8.5<H>  /  Alb  3.6  /  TBili  0.8  /  DBili  x   /  AST  26  /  ALT  22  /  AlkPhos  108      PT/INR - ( 2024 07:15 )   PT: 11.0 sec;   INR: 0.94 ratio         PTT - ( 2024 07:15 )  PTT:33.1 sec  Urinalysis Basic - ( 2024 07:41 )    Color: Yellow / Appearance: Clear / S.009 / pH: x  Gluc: x / Ketone: Negative mg/dL  / Bili: Negative / Urobili: 0.2 mg/dL   Blood: x / Protein: 300 mg/dL / Nitrite: Negative   Leuk Esterase: Negative / RBC: 3 /HPF / WBC 4 /HPF   Sq Epi: x / Non Sq Epi: x / Bacteria: x        CULTURES:      RADIOLOGY & ADDITIONAL STUDIES:  < from: CT Head No Cont (24 @ 07:48) >  IMPRESSION:  1. 1.7 cm acute juxtacortical hematoma with surrounding vasogenic edema,   right parietal lobe. Recommend follow-up imaging, as underlying lesion   cannot be excluded.  2. Moderate-severe white matter hypodensities are nonspecific however   statistically reflects chronic microvascular ischemic change.    < end of copied text >

## 2024-06-29 NOTE — CHART NOTE - NSCHARTNOTEFT_GEN_A_CORE
EEG preliminary read (not final) on the initial recording hour(s) = x 1     No seizures recorded.  GPD with burst suppression pattern.   Diffuse cerebral dysfunction – severe    Final report to follow tomorrow morning after completion of study.    [facility] EEG Reading Room Ph#: [phone]  Epilepsy Answering Service after 5PM and before 8:30AM: Ph#: [phone]

## 2024-06-29 NOTE — PATIENT PROFILE ADULT - FUNCTIONAL ASSESSMENT - BASIC MOBILITY ASSESSMENT TYPE
From: Lorie Lara  To: Christa Driver MD  Sent: 7/13/2022 10:11 AM EDT  Subject: Glucose Test Strips    Hello!    I was curious if it were possible to have some Glucose test strips called in to my pharmacy ([facility] [place]). I currently use the ACCU-CHEK Guide Monitor and those are the strips I would need, please.     Thank you!     Best,    Lorie   Admission

## 2024-06-29 NOTE — PATIENT PROFILE ADULT - FALL HARM RISK - HARM RISK INTERVENTIONS

## 2024-06-29 NOTE — ED PROCEDURE NOTE - ADDITIONAL POST INTUBATION REVIEWED:
Tube initially secured at 22 cm, postintubation x-ray showing left lung atelectasis with right mainstem bronchus intubation.  Repositioned at 20 cm with recovery of left lung aeration on repeat x-ray.

## 2024-06-29 NOTE — CONSULT NOTE ADULT - ASSESSMENT
ASSESSMENT:   79F with MHx significant for HTN, HLD and CAD s/p stent who takes ASA presented to [facility] this morning with AMS and urinary incontinence and her last known well approximately 530am. CTH at [place] revealed R parietal lobe hematoma. Patient was transferred to [facility] for further management. Patient intubated in ED for airway protection.     PLAN:    - CT Head stat as it is 6 hours post initial in addition to CTA. Repeat CTH if any change in neuro exam. Neuro checks Q1. Sedation as needed for ventilator compliance. Neurology consult. Continue keppra BID, EEG. If CTH is stable and CTA is negative, neurosurgery likely to sign off.   - Ventilator management as per NSICU. ABG and CXR post intubation.  - SBP <160 unless CTA revealing vascular abnormality as potential source of bleed at which time SBP should be kept <140. Recommend repeating troponin as they had sharp increase, cardiology consult as per NSICU, EKG, TTE.  - VTE prophylaxis: [x] SCDs [x] hold chemoprophylaxis due to: ICH  - Hold any anticoagulation/antiplatelet at this time    CODE STATUS:  Full Code  DISPOSITION:  NSICU    Case discussed with Dr. Haney ASSESSMENT:   79F with MHx significant for HTN, HLD and CAD s/p stent who takes ASA presented to [facility] this morning with AMS and urinary incontinence and her last known well approximately 530am. CTH at [place] revealed R parietal lobe hematoma. Patient was transferred to [facility] for further management. Patient intubated in ED for airway protection.     PLAN:    - CT Head stat as it is 6 hours post initial in addition to CTA. Repeat CTH if any change in neuro exam. Neuro checks Q1. Sedation as needed for ventilator compliance. Neurology consult. Continue keppra BID, EEG. If CTH is stable and CTA is negative, neurosurgery likely to sign off.   - Ventilator management as per NSICU. ABG and CXR post intubation.  - SBP <160 unless CTA revealing vascular abnormality as potential source of bleed at which time SBP should be kept <140. Recommend repeating troponin as they had sharp increase, cardiology consult as per NSICU, EKG, TTE.  - VTE prophylaxis: [x] SCDs [x] hold chemoprophylaxis due to: ICH  - Hold any anticoagulation/antiplatelet at this time    CODE STATUS:  Full Code  DISPOSITION:  NSICU    Case discussed with Dr. Haney    UPDATE:  6 hour CTH showing stable bleed, CTA Head and Neck NORMAL as per radiology report. Discussed with Dr. Haney, no neurosurgical intervention, neurosurgery to sign off. Please reconsult as needed.

## 2024-06-29 NOTE — ED ADULT NURSE NOTE - NSFALLHARMRISKINTERV_ED_ALL_ED
Assistance OOB with selected safe patient handling equipment if applicable/Assistance with ambulation/Communicate risk of Fall with Harm to all staff, patient, and family/Monitor gait and stability/Monitor for mental status changes and reorient to person, place, and time, as needed/Move patient closer to nursing station/within visual sight of ED staff/Provide visual cue: red socks, yellow wristband, yellow gown, etc/Reinforce activity limits and safety measures with patient and family/Toileting schedule using arm’s reach rule for commode and bathroom/Use of alarms - bed, stretcher, chair and/or video monitoring/Bed in lowest position, wheels locked, appropriate side rails in place/Call bell, personal items and telephone in reach/Instruct patient to call for assistance before getting out of bed/chair/stretcher/Non-slip footwear applied when patient is off stretcher/[place] to call system/Physically safe environment - no spills, clutter or unnecessary equipment/Purposeful Proactive Rounding/Room/bathroom lighting operational, light cord in reach

## 2024-06-29 NOTE — ED PROVIDER NOTE - CLINICAL SUMMARY MEDICAL DECISION MAKING FREE TEXT BOX
79-year-old female transfer from [place] for right-sided parietal lobe bleed with worsening mental status on arrival, stat CT head pending for mental status change.  After discussion with daughter at bedside decision made to intubate patient for mental status change and airway protection.  IV fluids started for preintubation resuscitation, etomidate and succinylcholine  used for sedation and paralysis for intubation, successful intubation with direct laryngoscopy.  To admit to neuro ICU.

## 2024-06-29 NOTE — ED ADULT NURSE NOTE - CHIEF COMPLAINT QUOTE
transfer from [place] for ICH. given keppra ativan and started on Cardene drip pta. cardene currently at 2.5 mg/hr.

## 2024-06-29 NOTE — ED ADULT TRIAGE NOTE - CHIEF COMPLAINT QUOTE
transfer from [place] for ICH. given keppra ativan and started on Cardene drip pta. cardene currently at 2.5 mg/hr.

## 2024-06-29 NOTE — ED PROVIDER NOTE - ATTENDING CONTRIBUTION TO CARE
Seen with resident.  79-year-old female, history of hypertension, not on any anticoagulation, transferred from outside hospital for intracranial hemorrhage.  Patient found altered by family.  CAT scan from outside hospital revealed a spontaneous right parietal intracranial hemorrhage.  Started on nicardipine for BP control.  Patient arrived to ED, snoring respirations, agitated, not following commands.  No gross motor deficits, moving all 4 extremities spontaneously and equally.  Pupils are equal.  Family at bedside reviewed history and discussed plan of care.  Patient intubated for airway protection and impending clinical course.  See separate procedure note.  Initial x-ray noting left-sided atelectasis and right mainstem intubation.  Tube retracted with improvement of aeration of left side.  Patient accepted to the neuro ICU.  On propofol drip for sedation.  Cardene for blood pressure control.  Pending final imaging results and ABG at time of transfer to ICU.    I have personally provided _90__ minutes of critical care time exclusive of time spent on separately billable procedures. Time includes review of laboratory data, radiology results, discussion with consultants, and monitoring for potential decompensation. Interventions were performed as documented above    I personally saw the patient with the resident, and completed the key components of the history and physical exam. I then discussed the management plan with the resident.

## 2024-06-29 NOTE — ED PROVIDER NOTE - OBJECTIVE STATEMENT
79-year-old female with reported history of hypertension and hyperlipidemia brought in by EMS as a transfer from [facility] for a right-sided parietal lobe bleed with vasogenic edema.  EMS patient was found to have altered mental status earlier this morning around 5:30 AM.  On arrival to the ER patient very confused with nonpurposeful movement and making gurgling noises, concern for mental status change secondary to bleed or medications with potential for impending airway compromise.  Neurosurgery at bedside, preparing to intubate.

## 2024-06-29 NOTE — ED PROVIDER NOTE - CARE PLAN
Principal Discharge DX:	Non-traumatic intracranial hemorrhage  Secondary Diagnosis:	Acute respiratory failure   1

## 2024-06-29 NOTE — H&P ADULT - HISTORY OF PRESENT ILLNESS
HPI:  79F with MHx significant for HTN, HLD and CAD s/p stent who takes ASA presented to [facility] this morning with AMS and urinary incontinence and her last known well approximately 530am. CTH at [place] revealed R parietal lobe hematoma. Patient was transferred to [facility] for further management. Of note, patient recieved 3mg ativan, haldol and 1g keppra prior to arrival at [facility] ED for both agitation and potential seizures. Once in ED, ED providers concerned for patient's airway as she was gargling with respirations. Decision made by ED staff to intubate patient for airway protection. ROS unable to be obtained as patient was in distress altered on exam.   GCS: 12 (E3, V4, M5) prior to intubation, ICH: 1, mRS: 0          HOME MEDICATIONS:  Home Medications:    Vital Signs Last 24 Hrs  T(C): 37.6 (2024 06:55), Max: 37.6 (2024 06:55)  T(F): 99.7 (2024 06:55), Max: 99.7 (2024 06:55)  HR: 112 (2024 12:12) (80 - 138)  BP: 137/99 (2024 12:12) (116/83 - 207/94)  BP(mean): 107 (2024 12:12) (107 - 107)  RR: 18 (2024 12:12) (18 - 26)  SpO2: 95% (2024 12:12) (91% - 96%)    Parameters below as of 2024 12:12  Patient On (Oxygen Delivery Method): room air        LABS:                        14.0   11.42 )-----------( 305      ( 2024 07:15 )             44.1         140  |  106  |  10  ----------------------------<  192<H>  3.7   |  22  |  0.94    Ca    9.4      2024 07:15  Mg     1.9         TPro  8.5<H>  /  Alb  3.6  /  TBili  0.8  /  DBili  x   /  AST  26  /  ALT  22  /  AlkPhos  108      PT/INR - ( 2024 07:15 )   PT: 11.0 sec;   INR: 0.94 ratio         PTT - ( 2024 07:15 )  PTT:33.1 sec  Urinalysis Basic - ( 2024 07:41 )    Color: Yellow / Appearance: Clear / S.009 / pH: x  Gluc: x / Ketone: Negative mg/dL  / Bili: Negative / Urobili: 0.2 mg/dL   Blood: x / Protein: 300 mg/dL / Nitrite: Negative   Leuk Esterase: Negative / RBC: 3 /HPF / WBC 4 /HPF   Sq Epi: x / Non Sq Epi: x / Bacteria: x        CULTURES:      RADIOLOGY & ADDITIONAL STUDIES:  < from: CT Head No Cont (24 @ 07:48) >  IMPRESSION:  1. 1.7 cm acute juxtacortical hematoma with surrounding vasogenic edema,   right parietal lobe. Recommend follow-up imaging, as underlying lesion   cannot be excluded.  2. Moderate-severe white matter hypodensities are nonspecific however   statistically reflects chronic microvascular ischemic change.    < end of copied text >

## 2024-06-29 NOTE — PATIENT PROFILE ADULT - NSPROHARDOFHEAROTHER_GEN_ALL_CORE
[FreeTextEntry1] : Pt recovering well at home s/p OHS. Reviewed all medications and dosages with pt understanding. Pt has all medications in home and is taking as prescribed and dispensed by RN cornelia. Pain controlled with current medication regimen. No new symptoms, issues or concerns to report at this time. Pt ambulating with and without rollator at times and feels steady on feet. He feels he is progressing every day. As per his RN Archie, 02sat has been above 92% with exertion and during the day, but pt prefers to use 2L via NC at night as he feels he can "relax and breathe better in bed" Pt with remaining pedal edema, improving from knee to ankle, however pedal edema increased today. Encouraged higher elevation and compression socks for further improvement. RN Archie also states the weeping from SVG site has decreased and it appears to be trace at this time. Sutures left in place for in office removal. CT site suture removed as pt has had no further drainage for days.    PLAN:  -Continue current medication regimen  Pt may use Melatonin 5 mg at HS for sleep aide Increase protein intake, pt starting Ensure and protein smoothies   -Continue Post Operative Care including:      -Cleanse all incisions DAILY with mild soap and water. Avoid lotion, powders and/or creams near or on incisions       -Daily weights- report any increase of 2 lbs or more overnight to the [facility] or CTS team       -Incentive spirometry with cough and deep breathing several times per hour      -Ambulate as much as tolerated including outdoors if weather and safety permits      -Avoid lifting anything more than 5 lbs and avoid straining      -Maintain a low sodium, low fat, heart healthy diet, including healthy sources of protein Valve prophylaxis rev'd with pt understanding    Follow [facility] team will continue to follow up with pt status. NP/CCC roles explained with pt understanding, contact information provided. Pt agrees to call with any questions, issues or concerns.  Worsening symptoms reviewed with patient understanding.    FOLLOW UP APPOINTMENTS: CTS: Dr. Cho 5/15 CARDIOLOGIST: Dr. Blackburn 5/21 PULM: Dr. Solorio, pt RN to obtain sooner appt for pt RENAL: Dr. Vora, 5/24 [facility] ENDO: (for osteoporosis) as normal schedule for Dr. Francis Garcia  PCP: Pt encouraged to follow up within one month of discharge, Obi Johansen        speak loudly

## 2024-06-29 NOTE — H&P ADULT - CRITICAL CARE ATTENDING COMMENT
79F with acute encephalopathy, suspected seizures/status epilepticus.  Acute ?spont R parietal ICH, CTA unremarkable.  PMH of HTN, HLD and CAD s/p stents.  Troponin elevation.  Acute resp failure due to neurologic condition.  Leukocytosis, likely due to seizures.    Bedside sono: BL Alines, preserved bi-ventr systolic function, no WMA noted,     Plan:  neurochecks  started on Ceribell, pending cEEG placement   cont Propofol ggt for now  loaded with Keppra,  cont 1gr BID IV  maintain SBP   trend troponins, repeat EKG, obtain TTE; Cardiology involvement requested  maintain Osats>92%, normocapnia, vent adjusted, CXR  keep NPO, PPI for ppx; repeat ammonia level  monitor e-lytes and UOP, IVf for now post-contrast  sepsis w/up  SCDs, hold any anti-thrombotics in view of acute ICH      Pt is critically ill and at high risk of rapid deterioration/death due to abovementioned conditions.   Still requires critical care interventions - ongoing frequent evaluations, interventions and management adjustment by the Attending and ICU team, - and included review of relevant history, clinical examination, review of data and images, discussion of treatment with the multidisciplinary team and any consultants involved in this patient’s care as well as family discussion.

## 2024-06-29 NOTE — H&P ADULT - NSHPPHYSICALEXAM_GEN_ALL_CORE
PHYSICAL EXAM:  GENERAL: writhing around in stretcher  HEAD:  Atraumatic, normocephalic  ENMT: apparent bite rimma on L side of tongue  TYESHA COMA SCORE: E-3 V-4 M-5=12 which progressed to 11 (E2) by end of exam  MENTAL STATUS: AAO x1 (self); Awake; initially EO to voice which progressed to noxious stimuli; Only speaks when stimulated with noxious stimuli; not following commands  REFLEXES: PERRL. Corneals intact b/l.   MOTOR: HERNANDES spontaneously, strong, antigravity  SENSATION: unable to assess  COORDINATION: unable to assess  LUNG: BL chest rise, gargling respirations?  HEART: sinus tachycardia on monitor

## 2024-06-30 LAB
ANION GAP SERPL CALC-SCNC: 13 MMOL/L — SIGNIFICANT CHANGE UP (ref 5–17)
BASE EXCESS BLDA CALC-SCNC: -0.5 MMOL/L — SIGNIFICANT CHANGE UP (ref -2–3)
BLOOD GAS COMMENTS ARTERIAL: SIGNIFICANT CHANGE UP
BUN SERPL-MCNC: 9.3 MG/DL — SIGNIFICANT CHANGE UP (ref 8–20)
CALCIUM SERPL-MCNC: 8.3 MG/DL — LOW (ref 8.4–10.5)
CHLORIDE SERPL-SCNC: 107 MMOL/L — SIGNIFICANT CHANGE UP (ref 96–108)
CO2 SERPL-SCNC: 22 MMOL/L — SIGNIFICANT CHANGE UP (ref 22–29)
CREAT SERPL-MCNC: 0.76 MG/DL — SIGNIFICANT CHANGE UP (ref 0.5–1.3)
CULTURE RESULTS: SIGNIFICANT CHANGE UP
EGFR: 80 ML/MIN/1.73M2 — SIGNIFICANT CHANGE UP
GAS PNL BLDA: SIGNIFICANT CHANGE UP
GLUCOSE BLDC GLUCOMTR-MCNC: 121 MG/DL — HIGH (ref 70–99)
GLUCOSE SERPL-MCNC: 136 MG/DL — HIGH (ref 70–99)
GRAM STN FLD: ABNORMAL
HCO3 BLDA-SCNC: 24 MMOL/L — SIGNIFICANT CHANGE UP (ref 21–28)
HCT VFR BLD CALC: 36.8 % — SIGNIFICANT CHANGE UP (ref 34.5–45)
HGB BLD-MCNC: 11.9 G/DL — SIGNIFICANT CHANGE UP (ref 11.5–15.5)
HOROWITZ INDEX BLDA+IHG-RTO: SIGNIFICANT CHANGE UP
MAGNESIUM SERPL-MCNC: 1.6 MG/DL — SIGNIFICANT CHANGE UP (ref 1.6–2.6)
MCHC RBC-ENTMCNC: 28.9 PG — SIGNIFICANT CHANGE UP (ref 27–34)
MCHC RBC-ENTMCNC: 32.3 GM/DL — SIGNIFICANT CHANGE UP (ref 32–36)
MCV RBC AUTO: 89.3 FL — SIGNIFICANT CHANGE UP (ref 80–100)
MRSA PCR RESULT.: SIGNIFICANT CHANGE UP
PCO2 BLDA: 37 MMHG — SIGNIFICANT CHANGE UP (ref 32–45)
PH BLDA: 7.42 — SIGNIFICANT CHANGE UP (ref 7.35–7.45)
PHOSPHATE SERPL-MCNC: 3.1 MG/DL — SIGNIFICANT CHANGE UP (ref 2.4–4.7)
PLATELET # BLD AUTO: 197 K/UL — SIGNIFICANT CHANGE UP (ref 150–400)
PO2 BLDA: 73 MMHG — LOW (ref 83–108)
POTASSIUM SERPL-MCNC: 3.4 MMOL/L — LOW (ref 3.5–5.3)
POTASSIUM SERPL-SCNC: 3.4 MMOL/L — LOW (ref 3.5–5.3)
RBC # BLD: 4.12 M/UL — SIGNIFICANT CHANGE UP (ref 3.8–5.2)
RBC # FLD: 14.2 % — SIGNIFICANT CHANGE UP (ref 10.3–14.5)
S AUREUS DNA NOSE QL NAA+PROBE: SIGNIFICANT CHANGE UP
SAO2 % BLDA: 98.2 % — HIGH (ref 94–98)
SODIUM SERPL-SCNC: 142 MMOL/L — SIGNIFICANT CHANGE UP (ref 135–145)
SPECIMEN SOURCE: SIGNIFICANT CHANGE UP
SPECIMEN SOURCE: SIGNIFICANT CHANGE UP
WBC # BLD: 11.05 K/UL — HIGH (ref 3.8–10.5)
WBC # FLD AUTO: 11.05 K/UL — HIGH (ref 3.8–10.5)

## 2024-06-30 PROCEDURE — 93306 TTE W/DOPPLER COMPLETE: CPT | Mod: 26

## 2024-06-30 PROCEDURE — 99291 CRITICAL CARE FIRST HOUR: CPT

## 2024-06-30 PROCEDURE — 95720 EEG PHY/QHP EA INCR W/VEEG: CPT

## 2024-06-30 PROCEDURE — 99223 1ST HOSP IP/OBS HIGH 75: CPT

## 2024-06-30 PROCEDURE — 71045 X-RAY EXAM CHEST 1 VIEW: CPT | Mod: 26

## 2024-06-30 PROCEDURE — 70450 CT HEAD/BRAIN W/O DYE: CPT | Mod: 26

## 2024-06-30 RX ORDER — SERTRALINE HYDROCHLORIDE 100 MG/1
150 TABLET, FILM COATED ORAL DAILY
Refills: 0 | Status: DISCONTINUED | OUTPATIENT
Start: 2024-06-30 | End: 2024-07-01

## 2024-06-30 RX ORDER — POTASSIUM CHLORIDE 600 MG/1
10 TABLET, FILM COATED, EXTENDED RELEASE ORAL
Refills: 0 | Status: COMPLETED | OUTPATIENT
Start: 2024-06-30 | End: 2024-06-30

## 2024-06-30 RX ORDER — HYDROCORTISONE 100 MG/60ML
40 SUSPENSION RECTAL EVERY 6 HOURS
Refills: 0 | Status: COMPLETED | OUTPATIENT
Start: 2024-06-30 | End: 2024-07-01

## 2024-06-30 RX ORDER — METOPROLOL TARTRATE 50 MG
25 TABLET ORAL
Refills: 0 | Status: DISCONTINUED | OUTPATIENT
Start: 2024-06-30 | End: 2024-07-01

## 2024-06-30 RX ORDER — MAGNESIUM SULFATE 100 %
2 POWDER (GRAM) MISCELLANEOUS ONCE
Refills: 0 | Status: COMPLETED | OUTPATIENT
Start: 2024-06-30 | End: 2024-06-30

## 2024-06-30 RX ORDER — ACETAMINOPHEN 325 MG
650 TABLET ORAL ONCE
Refills: 0 | Status: COMPLETED | OUTPATIENT
Start: 2024-06-30 | End: 2024-06-30

## 2024-06-30 RX ORDER — ACETAMINOPHEN 325 MG
650 TABLET ORAL ONCE
Refills: 0 | Status: DISCONTINUED | OUTPATIENT
Start: 2024-06-30 | End: 2024-06-30

## 2024-06-30 RX ORDER — ENOXAPARIN SODIUM 100 MG/ML
40 INJECTION SUBCUTANEOUS EVERY 24 HOURS
Refills: 0 | Status: DISCONTINUED | OUTPATIENT
Start: 2024-06-30 | End: 2024-07-04

## 2024-06-30 RX ORDER — IPRATROPIUM BROMIDE AND ALBUTEROL SULFATE .5; 3 MG/3ML; MG/3ML
3 SOLUTION RESPIRATORY (INHALATION) EVERY 6 HOURS
Refills: 0 | Status: COMPLETED | OUTPATIENT
Start: 2024-06-30 | End: 2024-07-01

## 2024-06-30 RX ORDER — LOSARTAN POTASSIUM 100 MG/1
50 TABLET, FILM COATED ORAL DAILY
Refills: 0 | Status: DISCONTINUED | OUTPATIENT
Start: 2024-06-30 | End: 2024-07-02

## 2024-06-30 RX ORDER — MONTELUKAST SODIUM 10 MG/1
10 TABLET, FILM COATED ORAL DAILY
Refills: 0 | Status: DISCONTINUED | OUTPATIENT
Start: 2024-06-30 | End: 2024-07-11

## 2024-06-30 RX ORDER — LIDOCAINE HCL 28 MG/G
1 GEL TOPICAL EVERY 24 HOURS
Refills: 0 | Status: DISCONTINUED | OUTPATIENT
Start: 2024-06-30 | End: 2024-07-11

## 2024-06-30 RX ORDER — METHYLPREDNISOLONE ACETATE 20 MG/ML
125 VIAL (ML) INJECTION ONCE
Refills: 0 | Status: COMPLETED | OUTPATIENT
Start: 2024-06-30 | End: 2024-06-30

## 2024-06-30 RX ORDER — ACETAMINOPHEN 325 MG
1000 TABLET ORAL ONCE
Refills: 0 | Status: COMPLETED | OUTPATIENT
Start: 2024-06-30 | End: 2024-06-30

## 2024-06-30 RX ORDER — LEVALBUTEROL HYDROCHLORIDE 1.25 MG/3ML
0.31 SOLUTION RESPIRATORY (INHALATION) EVERY 6 HOURS
Refills: 0 | Status: DISCONTINUED | OUTPATIENT
Start: 2024-06-30 | End: 2024-07-01

## 2024-06-30 RX ORDER — ATORVASTATIN CALCIUM 20 MG/1
40 TABLET, FILM COATED ORAL AT BEDTIME
Refills: 0 | Status: DISCONTINUED | OUTPATIENT
Start: 2024-06-30 | End: 2024-07-11

## 2024-06-30 RX ORDER — DEXTROSE MONOHYDRATE AND SODIUM CHLORIDE 5; .3 G/100ML; G/100ML
250 INJECTION, SOLUTION INTRAVENOUS ONCE
Refills: 0 | Status: COMPLETED | OUTPATIENT
Start: 2024-06-30 | End: 2024-06-30

## 2024-06-30 RX ADMIN — ENOXAPARIN SODIUM 40 MILLIGRAM(S): 100 INJECTION SUBCUTANEOUS at 20:39

## 2024-06-30 RX ADMIN — ATORVASTATIN CALCIUM 40 MILLIGRAM(S): 20 TABLET, FILM COATED ORAL at 21:03

## 2024-06-30 RX ADMIN — DEXMEDETOMIDINE HYDROCHLORIDE 3.87 MICROGRAM(S)/KG/HR: 4 INJECTION, SOLUTION INTRAVENOUS at 23:17

## 2024-06-30 RX ADMIN — DEXMEDETOMIDINE HYDROCHLORIDE 3.87 MICROGRAM(S)/KG/HR: 4 INJECTION, SOLUTION INTRAVENOUS at 03:00

## 2024-06-30 RX ADMIN — HYDRALAZINE HYDROCHLORIDE 10 MILLIGRAM(S): 50 TABLET ORAL at 07:51

## 2024-06-30 RX ADMIN — PANTOPRAZOLE SODIUM 40 MILLIGRAM(S): 40 INJECTION, POWDER, FOR SOLUTION INTRAVENOUS at 11:09

## 2024-06-30 RX ADMIN — Medication 15 MILLILITER(S): at 05:19

## 2024-06-30 RX ADMIN — LIDOCAINE HCL 1 PATCH: 28 GEL TOPICAL at 20:58

## 2024-06-30 RX ADMIN — Medication 75 MILLILITER(S): at 07:52

## 2024-06-30 RX ADMIN — IPRATROPIUM BROMIDE AND ALBUTEROL SULFATE 3 MILLILITER(S): .5; 3 SOLUTION RESPIRATORY (INHALATION) at 12:30

## 2024-06-30 RX ADMIN — Medication 400 MILLIGRAM(S): at 20:38

## 2024-06-30 RX ADMIN — SERTRALINE HYDROCHLORIDE 150 MILLIGRAM(S): 100 TABLET, FILM COATED ORAL at 20:59

## 2024-06-30 RX ADMIN — Medication 10 MILLILITER(S): at 21:03

## 2024-06-30 RX ADMIN — Medication 650 MILLIGRAM(S): at 12:20

## 2024-06-30 RX ADMIN — HYDRALAZINE HYDROCHLORIDE 10 MILLIGRAM(S): 50 TABLET ORAL at 17:01

## 2024-06-30 RX ADMIN — LABETALOL HYDROCHLORIDE 10 MILLIGRAM(S): 300 TABLET ORAL at 18:23

## 2024-06-30 RX ADMIN — LOSARTAN POTASSIUM 50 MILLIGRAM(S): 100 TABLET, FILM COATED ORAL at 20:39

## 2024-06-30 RX ADMIN — LEVETIRACETAM 1000 MILLIGRAM(S): 100 INJECTION INTRAVENOUS at 17:05

## 2024-06-30 RX ADMIN — POTASSIUM CHLORIDE 100 MILLIEQUIVALENT(S): 600 TABLET, FILM COATED, EXTENDED RELEASE ORAL at 11:09

## 2024-06-30 RX ADMIN — Medication 25 MILLIGRAM(S): at 20:39

## 2024-06-30 RX ADMIN — POTASSIUM CHLORIDE 100 MILLIEQUIVALENT(S): 600 TABLET, FILM COATED, EXTENDED RELEASE ORAL at 07:40

## 2024-06-30 RX ADMIN — LEVETIRACETAM 1000 MILLIGRAM(S): 100 INJECTION INTRAVENOUS at 05:19

## 2024-06-30 RX ADMIN — Medication 650 MILLIGRAM(S): at 11:09

## 2024-06-30 RX ADMIN — Medication 1000 MILLIGRAM(S): at 21:00

## 2024-06-30 RX ADMIN — DEXTROSE MONOHYDRATE AND SODIUM CHLORIDE 250 MILLILITER(S): 5; .3 INJECTION, SOLUTION INTRAVENOUS at 17:05

## 2024-06-30 RX ADMIN — HYDRALAZINE HYDROCHLORIDE 10 MILLIGRAM(S): 50 TABLET ORAL at 05:18

## 2024-06-30 RX ADMIN — Medication 1 APPLICATION(S): at 05:29

## 2024-06-30 RX ADMIN — Medication 25 GRAM(S): at 07:40

## 2024-06-30 RX ADMIN — POTASSIUM CHLORIDE 100 MILLIEQUIVALENT(S): 600 TABLET, FILM COATED, EXTENDED RELEASE ORAL at 07:51

## 2024-06-30 RX ADMIN — DEXMEDETOMIDINE HYDROCHLORIDE 3.87 MICROGRAM(S)/KG/HR: 4 INJECTION, SOLUTION INTRAVENOUS at 07:46

## 2024-06-30 RX ADMIN — DEXMEDETOMIDINE HYDROCHLORIDE 3.87 MICROGRAM(S)/KG/HR: 4 INJECTION, SOLUTION INTRAVENOUS at 21:24

## 2024-06-30 RX ADMIN — Medication 125 MILLIGRAM(S): at 14:33

## 2024-06-30 RX ADMIN — HYDROCORTISONE 40 MILLIGRAM(S): 100 SUSPENSION RECTAL at 21:04

## 2024-06-30 NOTE — PROGRESS NOTE ADULT - ASSESSMENT
79F with acute encephalopathy, suspected seizures.  Acute ?spont R parietal ICH, CTA unremarkable.  PMH of HTN, HLD and CAD s/p stents.  Troponin elevation, plateaued.   Acute resp failure due to neurologic condition. Extubated 6/30.  Leukocytosis, likely due to seizures, resolving.    Plan:  neurochecks  cont cEEG  cont Keppra 1gr BID IV; Neurology f/up  stroke core measures, MRI w/ and w/o  maintain SBP ; repeat trops in am  maintain Osats>92%, incentive spirometry, Duonebs q 6h for 24 hrs  keep NPO, dysphagia screen  monitor e-lytes and UOP, IVf for now pending PO access  sepsis w/up, add sputum Cx  SCDs, start SQL

## 2024-06-30 NOTE — CONSULT NOTE ADULT - ASSESSMENT
The patient is a 79y Female with intracranial hemorrhage and seizure.     Seizure  Currently on Keppra 1000 mg q 12 h.  Would continue this for now.   follow EEG.    ICH  Control blood pressure.   Serial CT head.     Case discussed with ICU team (Dr Mcwilliams attending).

## 2024-06-30 NOTE — CONSULT NOTE ADULT - SUBJECTIVE AND OBJECTIVE BOX
[facility]                                        Neurology at [place]                                  [facility]                                      [address]                                           [place], [place], [address]                                                [phone]        CC: seizure and intracranial hemorrhage     HISTORY:  The patient is a 79y Female who presented to [place] with altered mental status, and urinary incontinence. CT noted to have intracranial hemorrhage. She was transferred to [facility] neuro-ICU for c-EEG and management.  Neurology called today to assess in anticipation for downgrading.     PAST MEDICAL & SURGICAL HISTORY:  Hypertension     MEDICATIONS  (STANDING):  chlorhexidine 0.12% Liquid 15 milliLiter(s) Oral Mucosa every 12 hours  chlorhexidine 2% Cloths 1 Application(s) Topical daily  dexMEDEtomidine Infusion 0.2 MICROgram(s)/kG/Hr (3.87 mL/Hr) IV Continuous <Continuous>  enoxaparin Injectable 40 milliGRAM(s) SubCutaneous every 24 hours  levETIRAcetam   Injectable 1000 milliGRAM(s) IV Push every 12 hours  pantoprazole  Injectable 40 milliGRAM(s) IV Push daily  senna Syrup 10 milliLiter(s) Oral at bedtime  sodium chloride 0.9%. 1000 milliLiter(s) (75 mL/Hr) IV Continuous <Continuous>    MEDICATIONS  (PRN):  hydrALAZINE Injectable 10 milliGRAM(s) IV Push every 2 hours PRN Sbp >160  labetalol Injectable 10 milliGRAM(s) IV Push every 2 hours PRN SBP>160    Allergies  penicillins (Unknown)    SOCIAL HISTORY:  Non smoker.     FAMILY HISTORY:  No family history of seizure.     ROS:  Constitutional: Unobtainable due to patient's condition.   Neuro: Unobtainable due to patient's condition.   Eyes: Unobtainable due to patient's condition.   Ears/nose/throat: Unobtainable due to patient's condition.   Cardiac: Unobtainable due to patient's condition.   Respiratory: Unobtainable due to patient's condition.   GI: Unobtainable due to patient's condition.   : Unobtainable due to patient's condition..  Integumentary: Unobtainable due to patient's condition.  Psych: Unobtainable due to patient's condition.  Heme: Unobtainable due to patient's condition.     Exam:  Vital Signs Last 24 Hrs  T(C): 38.3 (30 Jun 2024 10:30), Max: 38.5 (29 Jun 2024 21:45)  T(F): 100.9 (30 Jun 2024 10:30), Max: 101.3 (29 Jun 2024 21:45)  HR: 74 (30 Jun 2024 11:26) (65 - 882)  BP: 164/68 (30 Jun 2024 10:30) (92/50 - 236/137)  BP(mean): 96 (30 Jun 2024 10:30) (63 - 167)  RR: 19 (30 Jun 2024 10:30) (14 - 22)  SpO2: 98% (30 Jun 2024 11:26) (95% - 100%)    Parameters below as of 30 Jun 2024 08:00  Patient On (Oxygen Delivery Method): ventilator, cpap    General: NAD.   Carotid bruits absent.     Mental status: The patient is sedated on mechanical ventilator.     Cranial nerves: There is no papilledema. Pupils 1 mm and sluggish. No blink to threat from either side.   Not tracking with gaze. Eyes move conjugately to oculocephalic maneuvers. Face grossly symmetric although endotracheal tube in place. Palate and tongue cannot be assessed.     Motor/Sensory: There is normal bulk and tone.  No movement to stimuli.     Reflexes: 1+ throughout and plantar responses are flexor.    Cerebellar: Cannot be assessed.     LABS:                         11.9   11.05 )-----------( 197      ( 30 Jun 2024 04:25 )             36.8       06-30    142  |  107  |  9.3  ----------------------------<  136<H>  3.4<L>   |  22.0  |  0.76    Ca    8.3<L>      30 Jun 2024 04:25  Phos  3.1     06-30  Mg     1.6     06-30    TPro  7.9  /  Alb  3.9  /  TBili  0.8  /  DBili  x   /  AST  33<H>  /  ALT  15  /  AlkPhos  98  06-29  PT/INR - ( 29 Jun 2024 12:07 )   PT: 11.8 sec;   INR: 1.07 ratio   PTT - ( 29 Jun 2024 12:07 )  PTT:32.9 sec    RADIOLOGY   CT head images reviewed (and concur with report): There is 1.7 cm acute juxtacortical hematoma with surrounding vasogenic edema in the right parietal lobe.     EEG  Abnormal EEG   - GPDs with triphasic morphology.  - Moderate to severe generalized slowing.

## 2024-06-30 NOTE — EEG REPORT - NS EEG TEXT BOX
[facility]   COMPREHENSIVE EPILEPSY CENTER   REPORT OF CONTINUOUS VIDEO EEG     [facility]: [address], [place] [address], Ph#: [phone]  [facility]: [address], [place] [address], Ph#: [phone]  [facility]: [address] [place] [address], Ph#: [phone]    Patient Name: BIANCA IZQUIERDO  Age and : 79y ([birth year])  MRN #: [id number]  Location: [place] [address]  Referring Physician: Esther Mcwilliams    Start Time/Date: 2024  End Time/Date: 08:00 on 24  Duration: 15H    _____________________________________________________________  STUDY INFORMATION    EEG Recording Technique:  The patient underwent continuous Video-EEG monitoring, using Telemetry System hardware on the XLTek Digital System. EEG and video data were stored on a computer hard drive with important events saved in digital archive files. The material was reviewed by a physician (electroencephalographer / epileptologist) on a daily basis. Jose Roberto and seizure detection algorithms were utilized and reviewed. An EEG Technician attended to the patient, and was available throughout daytime work hours.  The epilepsy center neurologist was available in person or on call 24-hours per day.    EEG Placement and Labeling of Electrodes:  The EEG was performed utilizing 20 channel referential EEG connections (coronal over temporal over parasagittal montage) using all standard 10-20 electrode placements with EKG, with additional electrodes placed in the inferior temporal region using the modified 10-10 montage electrode placements for elective admissions, or if deemed necessary. Recording was at a sampling rate of 256 samples per second per channel. Time synchronized digital video recording was done simultaneously with EEG recording. A low light infrared camera was used for low light recording.     _____________________________________________________________  HISTORY    Patient is a 79y old  Female who presents with a chief complaint of IPH ?seizure (2024 09:49)      PERTINENT MEDICATION:  MEDICATIONS  (STANDING):  acetaminophen     Tablet .. 650 milliGRAM(s) Oral once  chlorhexidine 0.12% Liquid 15 milliLiter(s) Oral Mucosa every 12 hours  chlorhexidine 2% Cloths 1 Application(s) Topical daily  dexMEDEtomidine Infusion 0.2 MICROgram(s)/kG/Hr (3.87 mL/Hr) IV Continuous <Continuous>  enoxaparin Injectable 40 milliGRAM(s) SubCutaneous every 24 hours  levETIRAcetam   Injectable 1000 milliGRAM(s) IV Push every 12 hours  pantoprazole  Injectable 40 milliGRAM(s) IV Push daily  potassium chloride  10 mEq/100 mL IVPB 10 milliEquivalent(s) IV Intermittent every 1 hour  senna Syrup 10 milliLiter(s) Oral at bedtime  sodium chloride 0.9%. 1000 milliLiter(s) (75 mL/Hr) IV Continuous <Continuous>    _____________________________________________________________  STUDY INTERPRETATION    Findings: The background was spontaneously variable and reactive with 1-3s periods of discontinuity.   No posterior dominant rhythm seen.  Background predominantly consisted of theta, delta and faster activities.    Focal Slowing:   None were present.    Sleep Background:  Drowsiness and stage II sleep transients were not recorded.    Other Non-Epileptiform Findings:  Periodic frontally predominant broadly distributed triphasic waveforms present.    Interictal Epileptiform Activity:   None were present.    Events:  Clinical events: None recorded.  Seizures: None recorded.    Activation Procedures:   Hyperventilation was not performed.    Photic stimulation was not performed.     Artifacts:  Intermittent myogenic and movement artifacts were noted.  _____________________________________________________________  EEG SUMMARY/CLASSIFICATION    Abnormal EEG   - GPDs with triphasic morphology.  - Moderate to severe generalized slowing.    _____________________________________________________________  EEG IMPRESSION/CLINICAL CORRELATE    Abnormal EEG study.  Moderate to severe nonspecific diffuse or multifocal cerebral dysfunction.   Triphasic waveforms likely secondary to underlying non-specific encephalopathy.  No epileptiform pattern or seizure seen.    Velasquez Vela MD   of Neurology  Epilepsy/EEG Attending

## 2024-06-30 NOTE — PROGRESS NOTE ADULT - SUBJECTIVE AND OBJECTIVE BOX
HPI:  HPI:  79F with MHx significant for HTN, HLD and CAD s/p stent who takes ASA presented to [facility] this morning with AMS and urinary incontinence and her last known well approximately 530am. CTH at [place] revealed R parietal lobe hematoma. Patient was transferred to [facility] for further management. Of note, patient recieved 3mg ativan, haldol and 1g keppra prior to arrival at [facility] ED for both agitation and potential seizures. Once in ED, ED providers concerned for patient's airway as she was gargling with respirations. Decision made by ED staff to intubate patient for airway protection. ROS unable to be obtained as patient was in distress altered on exam.   GCS: 12 (E3, V4, M5) prior to intubation, ICH: 1, mRS: 0   (29 Jun 2024 14:08)    O/n events: weaned off Propofol, negative cEEG.  Extubated this am.    ICU Vital Signs Last 24 Hrs  T(C): 37.3 (30 Jun 2024 14:30), Max: 38.5 (29 Jun 2024 21:45)  T(F): 99.1 (30 Jun 2024 14:30), Max: 101.3 (29 Jun 2024 21:45)  HR: 88 (30 Jun 2024 14:30) (65 - 103)  BP: 134/57 (30 Jun 2024 14:30) (93/61 - 183/70)  BP(mean): 80 (30 Jun 2024 14:30) (68 - 132)  ABP: --  ABP(mean): --  RR: 24 (30 Jun 2024 14:30) (14 - 24)  SpO2: 95% (30 Jun 2024 14:30) (95% - 100%)    O2 Parameters below as of 30 Jun 2024 13:30  Patient On (Oxygen Delivery Method): mask, aerosol      Exam:  AOx3, FC, hypophonic, face symmetric, PERRLA, EOMI, motor - LUE 3/5 with drift, RUE 5/5, BL LE 2/5 effort dependent.  CTAB, no wheezing, no stridor.  S1S2 present  Abd soft, NT, ND  No peripheral swelling.

## 2024-06-30 NOTE — PROGRESS NOTE ADULT - ASSESSMENT
80 y/o with hx of HTN, HLD, CAD s/p stent in 2000 (family unsure as to what type of stent it is), takes ASA and Crestor presented to [facility] with AMS and urinary incontinence where LKW was 5:30 AM prior to arrival. CTH in [place] revealed R parietal lobe hematoma. Pt transferred to [facility] for further management. Once arrival in [facility] ED, patient was gargling with respirations, so pt was intubated for airway protection. Cardiology consulted for elevated troponin.

## 2024-06-30 NOTE — PROGRESS NOTE ADULT - SUBJECTIVE AND OBJECTIVE BOX
[facility]                                                         CARDIOLOGY AT [address], [place] [address]                                                         Telephone: [phone]. Fax:[phone]                                                                             PROGRESS NOTE    Reason for follow up: elevated trop  Update: remains intubated       Review of symptoms:   Unable to obtain     Vitals:  T(C): 38.1 (06-30-24 @ 09:15), Max: 38.5 (06-29-24 @ 21:45)  HR: 75 (06-30-24 @ 09:15) (65 - 882)  BP: 135/66 (06-30-24 @ 09:15) (92/50 - 236/137)  RR: 18 (06-30-24 @ 09:15) (14 - 22)  SpO2: 98% (06-30-24 @ 09:15) (93% - 100%)  Wt(kg): --  I&O's Summary    29 Jun 2024 07:01 - 30 Jun 2024 07:00  --------------------------------------------------------  IN: 1496 mL / OUT: 860 mL / NET: 636 mL    30 Jun 2024 07:01 - 30 Jun 2024 09:49  --------------------------------------------------------  IN: 188 mL / OUT: 60 mL / NET: 128 mL      Weight (kg): 77.3 (06-29 @ 14:15), 75.7 (06-29 @ 06:55)    PHYSICAL EXAM:  Appearance: Comfortable. No acute distress  HEENT:  Atraumatic. Normocephalic.  Normal oral mucosa  Cardiovascular: RRR S1 S2, No murmur, no rubs/gallops. No JVD  Respiratory: Lungs clear to auscultation, unlabored   Gastrointestinal:  Soft, Non-tender, + BS  Lower Extremities: 2+ Peripheral Pulses, No clubbing, cyanosis, or edema  Psychiatry: Patient is calm. No agitation.   Skin: warm and dry.    CURRENT CARDIAC MEDICATIONS:  hydrALAZINE Injectable 10 milliGRAM(s) IV Push every 2 hours PRN  labetalol Injectable 10 milliGRAM(s) IV Push every 2 hours PRN      CURRENT OTHER MEDICATIONS:  acetaminophen     Tablet .. 650 milliGRAM(s) Oral once  dexMEDEtomidine Infusion 0.2 MICROgram(s)/kG/Hr (3.87 mL/Hr) IV Continuous <Continuous>  levETIRAcetam   Injectable 1000 milliGRAM(s) IV Push every 12 hours  pantoprazole  Injectable 40 milliGRAM(s) IV Push daily  senna Syrup 10 milliLiter(s) Oral at bedtime  chlorhexidine 0.12% Liquid 15 milliLiter(s) Oral Mucosa every 12 hours  chlorhexidine 2% Cloths 1 Application(s) Topical daily  enoxaparin Injectable 40 milliGRAM(s) SubCutaneous every 24 hours  potassium chloride  10 mEq/100 mL IVPB 10 milliEquivalent(s) IV Intermittent every 1 hour, Stop order after: 3 Doses  sodium chloride 0.9%. 1000 milliLiter(s) (75 mL/Hr) IV Continuous <Continuous>      LABS:	 	  ( 29 Jun 2024 07:15 )  Troponin T  X    ,  CPK  104  , CKMB  X    , BNP X                                  11.9   11.05 )-----------( 197      ( 30 Jun 2024 04:25 )             36.8     06-30    142  |  107  |  9.3  ----------------------------<  136<H>  3.4<L>   |  22.0  |  0.76    Ca    8.3<L>      30 Jun 2024 04:25  Phos  3.1     06-30  Mg     1.6     06-30    TPro  7.9  /  Alb  3.9  /  TBili  0.8  /  DBili  x   /  AST  33<H>  /  ALT  15  /  AlkPhos  98  06-29    PT/INR/PTT ( 29 Jun 2024 12:07 )                       :                       :      11.8         :       32.9                  .        .                   .              .           .       1.07        .                                       Lipid Profile: Date: 06-29 @ 14:50  Total cholesterol 128; Direct LDL: --; HDL: 60; Triglycerides:105    HgA1c:   TSH: Thyroid Stimulating Hormone, Serum: 2.35 uIU/mL      TELEMETRY: SR      DIAGNOSTIC TESTING:  [ ] Echocardiogram: PEND

## 2024-07-01 LAB
ANION GAP SERPL CALC-SCNC: 14 MMOL/L — SIGNIFICANT CHANGE UP (ref 5–17)
BUN SERPL-MCNC: 8.6 MG/DL — SIGNIFICANT CHANGE UP (ref 8–20)
CALCIUM SERPL-MCNC: 8.4 MG/DL — SIGNIFICANT CHANGE UP (ref 8.4–10.5)
CHLORIDE SERPL-SCNC: 107 MMOL/L — SIGNIFICANT CHANGE UP (ref 96–108)
CO2 SERPL-SCNC: 19 MMOL/L — LOW (ref 22–29)
CREAT SERPL-MCNC: 0.48 MG/DL — LOW (ref 0.5–1.3)
CULTURE RESULTS: ABNORMAL
EGFR: 96 ML/MIN/1.73M2 — SIGNIFICANT CHANGE UP
GLUCOSE SERPL-MCNC: 147 MG/DL — HIGH (ref 70–99)
HCT VFR BLD CALC: 32.1 % — LOW (ref 34.5–45)
HGB BLD-MCNC: 10.9 G/DL — LOW (ref 11.5–15.5)
MAGNESIUM SERPL-MCNC: 1.7 MG/DL — SIGNIFICANT CHANGE UP (ref 1.6–2.6)
MCHC RBC-ENTMCNC: 29.7 PG — SIGNIFICANT CHANGE UP (ref 27–34)
MCHC RBC-ENTMCNC: 34 GM/DL — SIGNIFICANT CHANGE UP (ref 32–36)
MCV RBC AUTO: 87.5 FL — SIGNIFICANT CHANGE UP (ref 80–100)
PHOSPHATE SERPL-MCNC: 2.2 MG/DL — LOW (ref 2.4–4.7)
PLATELET # BLD AUTO: 194 K/UL — SIGNIFICANT CHANGE UP (ref 150–400)
POTASSIUM SERPL-MCNC: 3.2 MMOL/L — LOW (ref 3.5–5.3)
POTASSIUM SERPL-SCNC: 3.2 MMOL/L — LOW (ref 3.5–5.3)
RBC # BLD: 3.67 M/UL — LOW (ref 3.8–5.2)
RBC # FLD: 14.3 % — SIGNIFICANT CHANGE UP (ref 10.3–14.5)
SODIUM SERPL-SCNC: 140 MMOL/L — SIGNIFICANT CHANGE UP (ref 135–145)
SPECIMEN SOURCE: SIGNIFICANT CHANGE UP
TROPONIN T, HIGH SENSITIVITY RESULT: 62 NG/L — HIGH (ref 0–51)
WBC # BLD: 16.03 K/UL — HIGH (ref 3.8–10.5)
WBC # FLD AUTO: 16.03 K/UL — HIGH (ref 3.8–10.5)

## 2024-07-01 PROCEDURE — 99233 SBSQ HOSP IP/OBS HIGH 50: CPT

## 2024-07-01 PROCEDURE — 99232 SBSQ HOSP IP/OBS MODERATE 35: CPT

## 2024-07-01 PROCEDURE — 73030 X-RAY EXAM OF SHOULDER: CPT | Mod: 26,50

## 2024-07-01 PROCEDURE — 71045 X-RAY EXAM CHEST 1 VIEW: CPT | Mod: 26

## 2024-07-01 RX ORDER — PANTOPRAZOLE SODIUM 40 MG/10ML
40 INJECTION, POWDER, FOR SOLUTION INTRAVENOUS
Refills: 0 | Status: COMPLETED | OUTPATIENT
Start: 2024-07-01 | End: 2024-07-06

## 2024-07-01 RX ORDER — POTASSIUM CHLORIDE 600 MG/1
40 TABLET, FILM COATED, EXTENDED RELEASE ORAL EVERY 4 HOURS
Refills: 0 | Status: COMPLETED | OUTPATIENT
Start: 2024-07-01 | End: 2024-07-01

## 2024-07-01 RX ORDER — METOPROLOL TARTRATE 50 MG
25 TABLET ORAL
Refills: 0 | Status: DISCONTINUED | OUTPATIENT
Start: 2024-07-01 | End: 2024-07-03

## 2024-07-01 RX ORDER — LEVALBUTEROL HYDROCHLORIDE 1.25 MG/3ML
0.63 SOLUTION RESPIRATORY (INHALATION) EVERY 6 HOURS
Refills: 0 | Status: DISCONTINUED | OUTPATIENT
Start: 2024-07-01 | End: 2024-07-11

## 2024-07-01 RX ORDER — PREDNISONE 10 MG/1
40 TABLET ORAL DAILY
Refills: 0 | Status: DISCONTINUED | OUTPATIENT
Start: 2024-07-01 | End: 2024-07-02

## 2024-07-01 RX ORDER — LEVETIRACETAM 100 MG/ML
1000 INJECTION INTRAVENOUS
Refills: 0 | Status: DISCONTINUED | OUTPATIENT
Start: 2024-07-01 | End: 2024-07-03

## 2024-07-01 RX ORDER — SERTRALINE HYDROCHLORIDE 100 MG/1
100 TABLET, FILM COATED ORAL DAILY
Refills: 0 | Status: DISCONTINUED | OUTPATIENT
Start: 2024-07-01 | End: 2024-07-11

## 2024-07-01 RX ORDER — SENNOSIDES 8.6 MG
10 TABLET ORAL AT BEDTIME
Refills: 0 | Status: DISCONTINUED | OUTPATIENT
Start: 2024-07-01 | End: 2024-07-11

## 2024-07-01 RX ORDER — MAGNESIUM SULFATE 100 %
2 POWDER (GRAM) MISCELLANEOUS ONCE
Refills: 0 | Status: COMPLETED | OUTPATIENT
Start: 2024-07-01 | End: 2024-07-01

## 2024-07-01 RX ORDER — POLYETHYLENE GLYCOL 3350 1 G/G
17 POWDER ORAL DAILY
Refills: 0 | Status: DISCONTINUED | OUTPATIENT
Start: 2024-07-01 | End: 2024-07-11

## 2024-07-01 RX ORDER — METHYLPREDNISOLONE ACETATE 20 MG/ML
40 VIAL (ML) INJECTION ONCE
Refills: 0 | Status: COMPLETED | OUTPATIENT
Start: 2024-07-01 | End: 2024-07-01

## 2024-07-01 RX ORDER — PANTOPRAZOLE SODIUM 40 MG/10ML
40 INJECTION, POWDER, FOR SOLUTION INTRAVENOUS DAILY
Refills: 0 | Status: COMPLETED | OUTPATIENT
Start: 2024-07-01 | End: 2024-07-01

## 2024-07-01 RX ORDER — FUROSEMIDE 10 MG/ML
20 INJECTION, SOLUTION INTRAMUSCULAR; INTRAVENOUS ONCE
Refills: 0 | Status: COMPLETED | OUTPATIENT
Start: 2024-07-01 | End: 2024-07-01

## 2024-07-01 RX ORDER — ACETAMINOPHEN 325 MG
1000 TABLET ORAL ONCE
Refills: 0 | Status: COMPLETED | OUTPATIENT
Start: 2024-07-01 | End: 2024-07-01

## 2024-07-01 RX ORDER — IPRATROPIUM BROMIDE AND ALBUTEROL SULFATE .5; 3 MG/3ML; MG/3ML
3 SOLUTION RESPIRATORY (INHALATION) EVERY 6 HOURS
Refills: 0 | Status: DISCONTINUED | OUTPATIENT
Start: 2024-07-01 | End: 2024-07-02

## 2024-07-01 RX ORDER — BENZOCAINE AND MENTHOL 15; 3.6 MG/1; MG/1
1 LOZENGE ORAL EVERY 4 HOURS
Refills: 0 | Status: DISCONTINUED | OUTPATIENT
Start: 2024-07-01 | End: 2024-07-02

## 2024-07-01 RX ORDER — POTASSIUM PHOSPHATE, MONOBASIC POTASSIUM PHOSPHATE, DIBASIC INJECTION, 236; 224 MG/ML; MG/ML
30 SOLUTION, CONCENTRATE INTRAVENOUS ONCE
Refills: 0 | Status: COMPLETED | OUTPATIENT
Start: 2024-07-01 | End: 2024-07-01

## 2024-07-01 RX ORDER — ACETAMINOPHEN 325 MG
650 TABLET ORAL EVERY 6 HOURS
Refills: 0 | Status: DISCONTINUED | OUTPATIENT
Start: 2024-07-01 | End: 2024-07-11

## 2024-07-01 RX ADMIN — ATORVASTATIN CALCIUM 40 MILLIGRAM(S): 20 TABLET, FILM COATED ORAL at 22:08

## 2024-07-01 RX ADMIN — LIDOCAINE HCL 1 PATCH: 28 GEL TOPICAL at 07:15

## 2024-07-01 RX ADMIN — IPRATROPIUM BROMIDE AND ALBUTEROL SULFATE 3 MILLILITER(S): .5; 3 SOLUTION RESPIRATORY (INHALATION) at 08:27

## 2024-07-01 RX ADMIN — HYDROCORTISONE 40 MILLIGRAM(S): 100 SUSPENSION RECTAL at 03:34

## 2024-07-01 RX ADMIN — LEVALBUTEROL HYDROCHLORIDE 0.63 MILLIGRAM(S): 1.25 SOLUTION RESPIRATORY (INHALATION) at 12:55

## 2024-07-01 RX ADMIN — PREDNISONE 40 MILLIGRAM(S): 10 TABLET ORAL at 12:28

## 2024-07-01 RX ADMIN — HYDRALAZINE HYDROCHLORIDE 10 MILLIGRAM(S): 50 TABLET ORAL at 01:03

## 2024-07-01 RX ADMIN — POTASSIUM CHLORIDE 40 MILLIEQUIVALENT(S): 600 TABLET, FILM COATED, EXTENDED RELEASE ORAL at 05:00

## 2024-07-01 RX ADMIN — Medication 25 GRAM(S): at 04:52

## 2024-07-01 RX ADMIN — Medication 1000 MILLIGRAM(S): at 13:30

## 2024-07-01 RX ADMIN — HYDROCORTISONE 40 MILLIGRAM(S): 100 SUSPENSION RECTAL at 09:34

## 2024-07-01 RX ADMIN — LOSARTAN POTASSIUM 50 MILLIGRAM(S): 100 TABLET, FILM COATED ORAL at 05:00

## 2024-07-01 RX ADMIN — LIDOCAINE HCL 1 PATCH: 28 GEL TOPICAL at 23:02

## 2024-07-01 RX ADMIN — SERTRALINE HYDROCHLORIDE 100 MILLIGRAM(S): 100 TABLET, FILM COATED ORAL at 12:27

## 2024-07-01 RX ADMIN — IPRATROPIUM BROMIDE AND ALBUTEROL SULFATE 3 MILLILITER(S): .5; 3 SOLUTION RESPIRATORY (INHALATION) at 15:45

## 2024-07-01 RX ADMIN — MONTELUKAST SODIUM 10 MILLIGRAM(S): 10 TABLET, FILM COATED ORAL at 12:28

## 2024-07-01 RX ADMIN — Medication 400 MILLIGRAM(S): at 13:09

## 2024-07-01 RX ADMIN — POTASSIUM PHOSPHATE, MONOBASIC POTASSIUM PHOSPHATE, DIBASIC INJECTION, 83.33 MILLIMOLE(S): 236; 224 SOLUTION, CONCENTRATE INTRAVENOUS at 06:41

## 2024-07-01 RX ADMIN — Medication 25 MILLIGRAM(S): at 05:00

## 2024-07-01 RX ADMIN — LEVETIRACETAM 1000 MILLIGRAM(S): 100 INJECTION INTRAVENOUS at 17:20

## 2024-07-01 RX ADMIN — ENOXAPARIN SODIUM 40 MILLIGRAM(S): 100 INJECTION SUBCUTANEOUS at 22:02

## 2024-07-01 RX ADMIN — Medication 1 APPLICATION(S): at 05:02

## 2024-07-01 RX ADMIN — HYDRALAZINE HYDROCHLORIDE 10 MILLIGRAM(S): 50 TABLET ORAL at 22:01

## 2024-07-01 RX ADMIN — POTASSIUM CHLORIDE 40 MILLIEQUIVALENT(S): 600 TABLET, FILM COATED, EXTENDED RELEASE ORAL at 09:35

## 2024-07-01 RX ADMIN — IPRATROPIUM BROMIDE AND ALBUTEROL SULFATE 3 MILLILITER(S): .5; 3 SOLUTION RESPIRATORY (INHALATION) at 20:46

## 2024-07-01 RX ADMIN — LABETALOL HYDROCHLORIDE 10 MILLIGRAM(S): 300 TABLET ORAL at 00:02

## 2024-07-01 RX ADMIN — DEXMEDETOMIDINE HYDROCHLORIDE 3.87 MICROGRAM(S)/KG/HR: 4 INJECTION, SOLUTION INTRAVENOUS at 04:52

## 2024-07-01 RX ADMIN — PANTOPRAZOLE SODIUM 40 MILLIGRAM(S): 40 INJECTION, POWDER, FOR SOLUTION INTRAVENOUS at 12:28

## 2024-07-01 RX ADMIN — Medication 40 MILLIGRAM(S): at 22:34

## 2024-07-01 RX ADMIN — LEVETIRACETAM 1000 MILLIGRAM(S): 100 INJECTION INTRAVENOUS at 05:00

## 2024-07-01 RX ADMIN — POLYETHYLENE GLYCOL 3350 17 GRAM(S): 1 POWDER ORAL at 12:28

## 2024-07-01 RX ADMIN — Medication 25 MILLIGRAM(S): at 17:20

## 2024-07-01 NOTE — DIETITIAN INITIAL EVALUATION ADULT - OTHER INFO
Pt is a 79 year old F with MHx significant for HTN, HLD and CAD s/p stent who takes ASA presented to [facility] this morning with AMS and urinary incontinence and her last known well approximately 530am. CTH at [place] revealed R parietal lobe hematoma. Patient was transferred to [facility] for further management. Of note, patient received 3mg ativan, haldol and 1g keppra prior to arrival at [facility] ED for both agitation and potential seizures. Once in ED, ED providers concerned for patient's airway as she was gargling with respirations. Decision made by ED staff to intubate patient for airway protection. ROS unable to be obtained as patient was in distress altered on exam  Pt admitted with acute resp failure due to neurologic condition. Extubated 6/30.

## 2024-07-01 NOTE — PROGRESS NOTE ADULT - ASSESSMENT
ASSESSMENT:  80 y/o Female with PMHx significant for HTN, HLD and CAD s/p stent who takes ASA presented to [facility] the morning of 6/29/24 with AMS and urinary incontinence and her last known well approximately 530am. CTH at [place] revealed Right parietal lobe hematoma with surrounding vasogenic edema. Patient was transferred to [facility] for further management. Of note, patient recieved 3mg ativan, haldol and 1g keppra prior to arrival at [facility] ED for both agitation and potential seizures. Once in ED, patient was intubated for airway protection.     Patient extubated 6/30/24, tolerating room air, doing well.    Etiology of hemorrhage possibly amyloid angiopathy, pending further imaging.  Hypertension likely 2/2 seizure, patient arrived from [place] on Cardene gtt.  Per initial triage note at [place], at home patient became unresponsive, foaming at mouth, clenching jaw, snoring respirations with urinary incontinence and tongue biting.  Likely new-onset seizures in the setting of ICH.     NEURO:  #Right parietal lobe hematoma with surrounding vasogenic edema   #New onset seizures  -Neurologically patient with ongoing improvement   -Continue close monitoring for neurological deterioration  -Stroke neuro checks q 4 hours  -Pending MR Head w/wo, assess for underlying contributory lesion    -SBP Goal 100-160 mmHg, avoiding rapid fluctuations and hypotension  -ANTITHROMBOTIC THERAPY: On hold, ICH -- patient on ASA at home (hx CAD), pending timeline for reinitiation after MR  -STATIN THERAPY: Atorvastatin 40mg PO daily, home med therapeutic interchange   -AED: Keppra 1000mg PO BID   -Dysphagia Screening: PASS  -LDL 47  -A1C 5.8    CARDIAC:  #HTN #HLD #hx CAD, s/p PCI  -VS q 4 hours  -TTE as noted: EF 60-65%  -Troponin initially elevated, downtrending, likely demand ischemia  -  -Continue cardiac monitoring pending findings of further workup    PULM:  #?Atelectasis  -Patient tolerating room air, saturating well  -CXR with slight scattered infiltrates, improving now  -No evidence of infectious process on CXR  -Continue Xopenex q 6 hours PRN SOB/wheezing  -Continue Duoneb q 6 hours PRN SOB/wheezing  -Prednisone 40mg PO q 3 days  -Incentive spirometry, encourage mobility   -Continue to maintain spO2 > 94%    HEME:  #Normocytic anemia  -H/H 10.9/32.1  -Platelets 194  -Continue to monitor, CBC q daily  -DVT ppx: Lovenox    GI:  -Abdominal exam benign, patient with no complaints  -Diet: Regular (Vegan)  -Bowel regimen: Miralax and senna    RENAL/METABOLIC:  #Hypokalemia #Hypophosphatemia   -BUN/Cr 8.6/0.48, GFR 96  -Patient voiding well w/o difficulty, continue I&O's   -K 3.2 this AM, Phos 2.2 this AM, repleted by NSICU team   -Electrolytes WNL, replete PRN  -BMP q daily, serum Mag and Phos q daily     ENDOCRINE:  -A1C 5.8  -TSH 2.35    ID:  -Afebrile, leukocytosis likely 2/2 steroid use, continue to monitor  -No indication of infection/sepsis  -Continue to monitor for signs/symptoms of infection    OTHER: Condition discussed w/ patient and family at bedside.    DISPO: Pending clinical course      CORE MEASURES:        Admission NIHSS:      Tenecteplase : [] YES [x] NO      LDL/A1C: 47/5.8     Depression Screen- if depression hx and/or present      Statin Therapy: Atorvastatin 40mg     Dysphagia Screen: [x] PASS [] FAIL     Smoking [] YES [x] NO      Afib [] YES [x] NO     Stroke Education [] YES [] NO [x] PENDING   ASSESSMENT:  80 y/o Female with PMHx significant for HTN, HLD and CAD s/p stent who takes ASA presented to [facility] the morning of 6/29/24 with AMS and urinary incontinence and her last known well approximately 530am. CTH at [place] revealed Right parietal lobe hematoma with surrounding vasogenic edema. Patient was transferred to [facility] for further management. Of note, patient recieved 3mg ativan, haldol and 1g keppra prior to arrival at [facility] ED for both agitation and potential seizures. Once in ED, patient was intubated for airway protection.     Patient extubated 6/30/24, tolerating room air, doing well.    Etiology of hemorrhage possibly amyloid angiopathy, pending further imaging.  Hypertension likely 2/2 seizure, patient arrived from [place] on Cardene gtt.  Per initial triage note at [place], at home patient became unresponsive, foaming at mouth, clenching jaw, snoring respirations with urinary incontinence and tongue biting.  Likely new-onset seizures in the setting of ICH.     NEURO:  #Right parietal lobe hematoma with surrounding vasogenic edema   #New onset seizures  -Neurologically patient with ongoing improvement   -Continue close monitoring for neurological deterioration  -Stroke neuro checks q 4 hours  -Pending MR Head w/wo, assess for underlying contributory lesion    -SBP Goal 100-160 mmHg, avoiding rapid fluctuations and hypotension  -ANTITHROMBOTIC THERAPY: On hold, ICH -- patient on ASA at home (hx CAD), pending timeline for reinitiation after MR  -STATIN THERAPY: Atorvastatin 40mg PO daily, home med therapeutic interchange   -AED: Keppra 1000mg PO BID   -Seizure precautions  -Dysphagia Screening: PASS  -LDL 47  -A1C 5.8    CARDIAC:  #HTN #HLD #hx CAD, s/p PCI  -VS q 4 hours  -TTE as noted: EF 60-65%  -Troponin initially elevated, downtrending, likely demand ischemia  -  -Continue cardiac monitoring pending findings of further workup    PULM:  #?Atelectasis  -Patient tolerating room air, saturating well  -CXR with slight scattered infiltrates, improving now  -No evidence of infectious process on CXR  -Continue Xopenex q 6 hours PRN SOB/wheezing  -Continue Duoneb q 6 hours PRN SOB/wheezing  -Prednisone 40mg PO q 3 days  -Incentive spirometry, encourage mobility   -Continue to maintain spO2 > 94%    HEME:  #Normocytic anemia  -H/H 10.9/32.1  -Platelets 194  -Continue to monitor, CBC q daily  -DVT ppx: Lovenox    GI:  -Abdominal exam benign, patient with no complaints  -Diet: Regular (Vegan)  -Bowel regimen: Miralax and senna    RENAL/METABOLIC:  #Hypokalemia #Hypophosphatemia   -BUN/Cr 8.6/0.48, GFR 96  -Patient voiding well w/o difficulty, continue I&O's   -K 3.2 this AM, Phos 2.2 this AM, repleted by NSICU team   -Electrolytes WNL, replete PRN  -BMP q daily, serum Mag and Phos q daily     ENDOCRINE:  -A1C 5.8  -TSH 2.35    ID:  #Leukocytosis  -Afebrile, leukocytosis likely 2/2 steroid use, continue to monitor  -No indication of infection/sepsis  -Continue to monitor for signs/symptoms of infection    OTHER: Condition discussed w/ patient and family at bedside.    DISPO: Pending clinical course      CORE MEASURES:        Admission NIHSS: 2     Tenecteplase : [] YES [x] NO      LDL/A1C: 47/5.8     Depression Screen- if depression hx and/or present      Statin Therapy: Atorvastatin 40mg     Dysphagia Screen: [x] PASS [] FAIL     Smoking [] YES [x] NO      Afib [] YES [x] NO     Stroke Education [] YES [] NO [x] PENDING

## 2024-07-01 NOTE — PHYSICAL THERAPY INITIAL EVALUATION ADULT - GAIT DEVIATIONS NOTED, PT EVAL
[facility]        NAME: Lissa Landers is a 61 y.o. female  : 1963    MRN: [id number]  DATE: 2023  TIME: 6:58 PM    Assessment and Plan   Bronchitis [J40]  1. Bronchitis  Poct Covid 19 Rapid Antigen Test    XR chest pa & lateral    amoxicillin-clavulanate (AUGMENTIN) 875-125 mg per tablet            Patient Instructions       Follow up with PCP in 3-5 days. Proceed to  ER if symptoms worsen. Chief Complaint     Chief Complaint   Patient presents with    Cough     Cough x 4 weeks. However today woke up with nausea and fatigue. Want to be tested for covid. History of Present Illness       Patient presents with 4 weeks of a cough and chest congestion. Was prescribed antibiotics 2 weeks ago without any relief. 2 weeks ago developed chills, nausea, and fatigue. Denies chest pains, SOB, dyspnea, runny nose. Multiple sick contacts. Cough  Associated symptoms include chills. Pertinent negatives include no chest pain, ear pain, fever, myalgias, postnasal drip, rhinorrhea, sore throat, shortness of breath or wheezing. Review of Systems   Review of Systems   Constitutional:  Positive for chills and fatigue. Negative for fever. HENT:  Positive for congestion. Negative for ear discharge, ear pain, postnasal drip, rhinorrhea, sinus pressure, sinus pain and sore throat. Respiratory:  Positive for cough. Negative for chest tightness, shortness of breath and wheezing. Cardiovascular:  Negative for chest pain and palpitations. Gastrointestinal:  Positive for nausea. Musculoskeletal:  Negative for arthralgias and myalgias. Neurological:  Negative for weakness. Psychiatric/Behavioral:  Negative for confusion.           Current Medications       Current Outpatient Medications:     amoxicillin-clavulanate (AUGMENTIN) 875-125 mg per tablet, Take 1 tablet by mouth every 12 (twelve) hours for 7 days, Disp: 14 tablet, Rfl: 0    Ascorbic Acid (VITAMIN C PO), Take by mouth daily, Disp: , Rfl:     benzonatate (TESSALON PERLES) 100 mg capsule, Take 1 capsule (100 mg total) by mouth 3 (three) times a day as needed for cough, Disp: 60 capsule, Rfl: 0    Bilberry, Vaccinium myrtillus, (BILBERRY EXTRACT PO), Take by mouth daily , Disp: , Rfl:     BIOTIN PO, Take by mouth daily , Disp: , Rfl:     Calcium Carbonate-Vitamin D (CALCIUM-D PO), Take by mouth daily , Disp: , Rfl:     COLLAGEN PO, Take by mouth daily , Disp: , Rfl:     Cyanocobalamin (VITAMIN B-12 PO), Take by mouth daily , Disp: , Rfl:     famotidine (PEPCID) 40 MG tablet, 1 tablet by mouth twice a day, Disp: 180 tablet, Rfl: 1    GLUCOSAMINE-CHONDROITIN PO, Take by mouth daily , Disp: , Rfl:     Misc Natural Products (TART CHERRY ADVANCED PO), Take by mouth daily, Disp: , Rfl:     Misc Natural Products (Turmeric Curcumin) CAPS, Take by mouth daily , Disp: , Rfl:     Multiple Vitamin (DAILY VITAMIN) tablet, Take 1 tablet by mouth daily , Disp: , Rfl:     Multiple Vitamins-Minerals (HAIR VITAMINS PO), Take by mouth daily , Disp: , Rfl:     NON FORMULARY, luti-gold OTC daily/ viviscalpro OTC daily, Disp: , Rfl:     oxybutynin (DITROPAN XL) 15 MG 24 hr tablet, TAKE 1 TABLET BY MOUTH DAILY AT BEDTIME, Disp: 90 tablet, Rfl: 1    pantoprazole (PROTONIX) 40 mg tablet, Take 1 tablet (40 mg total) by mouth daily, Disp: 90 tablet, Rfl: 1    Pomegranate, [place] granatum, (POMEGRANATE PO), Take by mouth daily , Disp: , Rfl:     Probiotic Product (PROBIOTIC DAILY PO), Take by mouth daily , Disp: , Rfl:     Pyridoxine HCl (VITAMIN B6 PO), Take by mouth daily , Disp: , Rfl:     Red Yeast Rice Extract (RED YEAST RICE PO), Take by mouth daily , Disp: , Rfl:     estradiol (ESTRACE) 0.1 mg/g vaginal cream, Insert 1 g into the vagina 2 (two) times a week, Disp: 42.5 g, Rfl: 2    triamcinolone (KENALOG) 0.5 % cream, Apply topically 3 (three) times a day (Patient not taking: Reported on 8/9/2023), Disp: 30 g, Rfl: 2    Current Allergies     Allergies as of 11/09/2023    (No Known Allergies)            The following portions of the patient's history were reviewed and updated as appropriate: allergies, current medications, past family history, past medical history, past social history, past surgical history and problem list.     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     Spider veins of both lower extremities     Umbilical hernia     Umbilical hernia without obstruction and without gangrene 08/11/2020    Wears glasses        Past Surgical History:   Procedure Laterality Date    AUGMENTATION MAMMAPLASTY Bilateral 2014    retro pec saline    CHOLECYSTECTOMY      COLONOSCOPY  2014    EGD      HERNIA REPAIR      [place] CMBND ANTERPOST COLPORRAPHY W/CYSTO N/A 12/22/2020    Procedure: ANT POSTCOLPORRHAPHY;  Surgeon: Jabier Tate MD;  Location: [facility];  Service: UroGynecology           NY CYSTOURETHROSCOPY N/A 12/22/2020    Procedure: Peng Bowman;  Surgeon: Jabier Tate MD;  [facility]: [facility];  Service: UroGynecology           NY LAPAROSCOPY SURG CHOLECYSTECTOMY N/A 12/22/2020    Procedure: LAP CARLOTTA;  Surgeon: Rylee Calvillo MD;  Location: [facility];  Service: General    NY RPR UMBILICAL HRNA 5 YRS/> REDUCIBLE N/A 12/22/2020    Procedure: REPAIR HERNIA UMBILICAL;  Surgeon: Rylee Calvillo MD;  [facility];  Service: General    NY SLING OPERATION STRESS INCONTINENCE N/A 12/22/2020    Procedure: PUBOVAGINAL SLING;  Surgeon: Jabier Tate MD;  [facility];  Service: UroGynecology           UPPER GASTROINTESTINAL ENDOSCOPY         Family History   Problem Relation Age of Onset Hypertension Mother     Diabetes Father     No Known Problems Sister     No Known Problems Sister     No Known Problems Sister     No Known Problems Sister     Other Son         lyme disease         Medications have been verified. Objective   /63   Pulse 96   Temp 99.2 °F (37.3 °C)   Resp 16   SpO2 96%   No LMP recorded. Patient is postmenopausal.       Physical Exam     Physical Exam  Constitutional:       General: She is not in acute distress. Appearance: Normal appearance. She is not ill-appearing or diaphoretic. HENT:      Right Ear: Tympanic membrane, ear canal and external ear normal.      Left Ear: Tympanic membrane, ear canal and external ear normal.      Nose: Nose normal. No congestion or rhinorrhea. Right Sinus: No maxillary sinus tenderness or frontal sinus tenderness. Left Sinus: No maxillary sinus tenderness or frontal sinus tenderness. Mouth/Throat:      Mouth: Mucous membranes are moist.      Pharynx: Oropharynx is clear. Eyes:      Conjunctiva/sclera: Conjunctivae normal.   Cardiovascular:      Rate and Rhythm: Normal rate and regular rhythm. Heart sounds: Normal heart sounds. Pulmonary:      Effort: Pulmonary effort is normal.      Breath sounds: Normal breath sounds. Skin:     General: Skin is warm and dry. Neurological:      Mental Status: She is alert.    Psychiatric:         Mood and Affect: Mood normal.         Behavior: Behavior normal. decreased shin/decreased step length/decreased stride length

## 2024-07-01 NOTE — PHARMACOTHERAPY INTERVENTION NOTE - COMMENTS
Referenced Dr. Torres and spoke with patient's daughter at bedside in regards to home medication list

## 2024-07-01 NOTE — DIETITIAN INITIAL EVALUATION ADULT - ORAL INTAKE PTA/DIET HISTORY
Spoke with pt's daughter at bedside. Pt's daughter reports pt was eating well PTA; denies any recent weight changes. Pt follows a Vegetarian diet and accepts Milk products. Food preferences obtained. Pt passed dysphagia screen; PO diet to advanced this morning.

## 2024-07-01 NOTE — PROGRESS NOTE ADULT - ASSESSMENT
The patient is a 79y Female with intracranial hemorrhage and seizure.     Seizure  Currently on Keppra 1000 mg q 12 h.  Would continue this for now.   EEG d/c    ICH  Control blood pressure.   Serial CT head.   for MRI brain    Case discussed with ICU team (Dr Mcwilliams attending).     will follow with you    Juan Daniel Ng MD PhD   [phone]

## 2024-07-01 NOTE — OCCUPATIONAL THERAPY INITIAL EVALUATION ADULT - VISUAL ASSESSMENT: TRACKING
continue to assess pt with difficulty isolating movements of eyes, following commands of assessment. ROM appears WFL bilaterally

## 2024-07-01 NOTE — PROGRESS NOTE ADULT - SUBJECTIVE AND OBJECTIVE BOX
[facility]                                        Neurology at [place]                                  [facility]                                      [address]                                           [place], [place], [address]                                                [phone]        CC: seizure and intracranial hemorrhage     HISTORY:  The patient is a 79y Female who presented to [facility] with altered mental status, and urinary incontinence. CT noted to have intracranial hemorrhage. She was transferred to [facility] neuro-ICU for c-EEG and management.  Neurology called today to assess in anticipation for downgrading. (JW)    Interval history: no new events or seizure    Review of systems (neurology): Denies headache or dizziness. (+) weakness No numbness.  Denies speech/language deficits. Denies diplopia/blurred vision.  Denies confusion    MEDICATIONS  (STANDING):  atorvastatin 40 milliGRAM(s) Oral at bedtime  chlorhexidine 2% Cloths 1 Application(s) Topical daily  enoxaparin Injectable 40 milliGRAM(s) SubCutaneous every 24 hours  levETIRAcetam 1000 milliGRAM(s) Oral two times a day  lidocaine   4% Patch 1 Patch Transdermal every 24 hours  losartan 50 milliGRAM(s) Oral daily  metoprolol tartrate 25 milliGRAM(s) Oral two times a day  montelukast 10 milliGRAM(s) Oral daily  polyethylene glycol 3350 17 Gram(s) Oral daily  predniSONE   Tablet 40 milliGRAM(s) Oral daily  senna Syrup 10 milliLiter(s) Oral at bedtime  sertraline 100 milliGRAM(s) Oral daily    MEDICATIONS  (PRN):  acetaminophen     Tablet .. 650 milliGRAM(s) Oral every 6 hours PRN Temp greater or equal to 38C (100.4F), Mild Pain (1 - 3)  albuterol/ipratropium for Nebulization 3 milliLiter(s) Nebulizer every 6 hours PRN Shortness of Breath and/or Wheezing  benzocaine/menthol Lozenge 1 Lozenge Oral every 4 hours PRN Sore Throat  hydrALAZINE Injectable 10 milliGRAM(s) IV Push every 2 hours PRN Sbp >160  labetalol Injectable 10 milliGRAM(s) IV Push every 2 hours PRN SBP>160  levalbuterol Inhalation 0.63 milliGRAM(s) Inhalation every 6 hours PRN SOB/wheezing      Vital Signs Last 24 Hrs  T(C): 37.1 (01 Jul 2024 13:00), Max: 38.2 (30 Jun 2024 20:00)  T(F): 98.8 (01 Jul 2024 13:00), Max: 100.8 (30 Jun 2024 20:00)  HR: 84 (01 Jul 2024 13:00) (68 - 113)  BP: 142/74 (01 Jul 2024 13:00) (96/60 - 195/82)  BP(mean): 95 (01 Jul 2024 13:00) (67 - 116)  RR: 21 (01 Jul 2024 13:00) (12 - 31)  SpO2: 100% (01 Jul 2024 13:00) (93% - 100%)    Parameters below as of 01 Jul 2024 12:00  Patient On (Oxygen Delivery Method): room air        Detailed Neurologic Exam:    Mental status: The patient is awake and alert and has normal attention span.  The patient is fully oriented in 3 spheres. The patient is able to name objects, follow commands, repeat sentences.    Cranial nerves: Pupils equal and react symmetrically to light. There is no visual field deficit to confrontation. Extraocular motion is full with no nystagmus. There is no ptosis. Facial sensation is intact. Facial musculature is symmetric.    Motor: There is normal bulk and tone.  There is no tremor.  Strength is 5/5 in the right arm and leg.   Strength is 4+/5 in the left arm and 4/5 leg.    Sensation: Intact to light touch in 4 extremities    Cerebellar: There is no dysmetria on finger to nose testing.    Gait : deferred    LABS:                           10.9   16.03 )-----------( 194      ( 01 Jul 2024 03:45 )             32.1     07-01    140  |  107  |  8.6  ----------------------------<  147<H>  3.2<L>   |  19.0<L>  |  0.48<L>    Ca    8.4      01 Jul 2024 03:45  Phos  2.2     07-01  Mg     1.7     07-01    _____________________________________________________________  EEG SUMMARY/CLASSIFICATION 6/30-7/1/24:    Abnormal EEG   - GPDs with triphasic morphology.  - Moderate generalized slowing.    _____________________________________________________________  EEG IMPRESSION/CLINICAL CORRELATE    Abnormal EEG study.  Moderate nonspecific diffuse or multifocal cerebral dysfunction.   Triphasic waveforms likely secondary to underlying non-specific encephalopathy.  No clear epileptiform pattern or seizure seen.    Note: Electrodes removed at 05:10AM.    RADIOLOGY   CT Head No Cont (06.30.24 @ 09:41)   IMPRESSION: No change since 6/29/2024. Right small right parietal   cortical parenchymal hemorrhage.    CT Angio Head and neck w/ IV Cont (06.29.24 @ 13:48)     Impression: Age-appropriate involutional and ischemic gliotic changes. No   change in right parietal parenchymal hemorrhage since 7:42 AM. Normal CTA   of the head and neck.

## 2024-07-01 NOTE — PROGRESS NOTE ADULT - SUBJECTIVE AND OBJECTIVE BOX
Preliminary note, offical recommendations pending attending review/signature   [facility] Stroke Team  Progress Note     HPI:  HPI:  79F with MHx significant for HTN, HLD and CAD s/p stent who takes ASA presented to [facility] this morning with AMS and urinary incontinence and her last known well approximately 530am. CTH at [place] revealed R parietal lobe hematoma. Patient was transferred to [facility] for further management. Of note, patient recieved 3mg ativan, haldol and 1g keppra prior to arrival at [facility] ED for both agitation and potential seizures. Once in ED, ED providers concerned for patient's airway as she was gargling with respirations. Decision made by ED staff to intubate patient for airway protection. ROS unable to be obtained as patient was in distress altered on exam.   GCS: 12 (E3, V4, M5) prior to intubation, ICH: 1, mRS: 0          HOME MEDICATIONS:  Home Medications:    Vital Signs Last 24 Hrs  T(C): 37.6 (2024 06:55), Max: 37.6 (2024 06:55)  T(F): 99.7 (2024 06:55), Max: 99.7 (2024 06:55)  HR: 112 (2024 12:12) (80 - 138)  BP: 137/99 (2024 12:12) (116/83 - 207/94)  BP(mean): 107 (2024 12:12) (107 - 107)  RR: 18 (2024 12:12) (18 - 26)  SpO2: 95% (2024 12:12) (91% - 96%)    Parameters below as of 2024 12:12  Patient On (Oxygen Delivery Method): room air        LABS:                        14.0   11.42 )-----------( 305      ( 2024 07:15 )             44.1     -    140  |  106  |  10  ----------------------------<  192<H>  3.7   |  22  |  0.94    Ca    9.4      2024 07:15  Mg     1.9         TPro  8.5<H>  /  Alb  3.6  /  TBili  0.8  /  DBili  x   /  AST  26  /  ALT  22  /  AlkPhos  108  -    PT/INR - ( 2024 07:15 )   PT: 11.0 sec;   INR: 0.94 ratio         PTT - ( 2024 07:15 )  PTT:33.1 sec  Urinalysis Basic - ( 2024 07:41 )    Color: Yellow / Appearance: Clear / S.009 / pH: x  Gluc: x / Ketone: Negative mg/dL  / Bili: Negative / Urobili: 0.2 mg/dL   Blood: x / Protein: 300 mg/dL / Nitrite: Negative   Leuk Esterase: Negative / RBC: 3 /HPF / WBC 4 /HPF   Sq Epi: x / Non Sq Epi: x / Bacteria: x        CULTURES:      RADIOLOGY & ADDITIONAL STUDIES:  < from: CT Head No Cont (24 @ 07:48) >  IMPRESSION:  1. 1.7 cm acute juxtacortical hematoma with surrounding vasogenic edema,   right parietal lobe. Recommend follow-up imaging, as underlying lesion   cannot be excluded.  2. Moderate-severe white matter hypodensities are nonspecific however   statistically reflects chronic microvascular ischemic change.    < end of copied text >       (2024 14:08)      Admission NIHSS  Pre-MRS  ICH Score (if applicable)    SUBJECTIVE: No events overnight.  No new neurologic complaints.  ROS reported negative unless otherwise noted.    acetaminophen     Tablet .. 650 milliGRAM(s) Oral every 6 hours PRN  albuterol/ipratropium for Nebulization 3 milliLiter(s) Nebulizer every 6 hours PRN  atorvastatin 40 milliGRAM(s) Oral at bedtime  benzocaine/menthol Lozenge 1 Lozenge Oral every 4 hours PRN  chlorhexidine 2% Cloths 1 Application(s) Topical daily  enoxaparin Injectable 40 milliGRAM(s) SubCutaneous every 24 hours  hydrALAZINE Injectable 10 milliGRAM(s) IV Push every 2 hours PRN  labetalol Injectable 10 milliGRAM(s) IV Push every 2 hours PRN  levalbuterol Inhalation 0.63 milliGRAM(s) Inhalation every 6 hours PRN  levETIRAcetam 1000 milliGRAM(s) Oral two times a day  lidocaine   4% Patch 1 Patch Transdermal every 24 hours  losartan 50 milliGRAM(s) Oral daily  metoprolol tartrate 25 milliGRAM(s) Oral two times a day  montelukast 10 milliGRAM(s) Oral daily  polyethylene glycol 3350 17 Gram(s) Oral daily  predniSONE   Tablet 40 milliGRAM(s) Oral daily  senna Syrup 10 milliLiter(s) Oral at bedtime  sertraline 100 milliGRAM(s) Oral daily      PHYSICAL EXAM:   Vital Signs Last 24 Hrs  T(C): 37.1 (2024 14:00), Max: 38.2 (2024 20:00)  T(F): 98.8 (2024 14:00), Max: 100.8 (2024 20:00)  HR: 89 (2024 14:00) (68 - 113)  BP: 131/59 (2024 14:00) (96/60 - 195/82)  BP(mean): 81 (2024 14:00) (67 - 116)  RR: 19 (2024 14:00) (12 - 31)  SpO2: 97% (:00) (93% - 100%)    Parameters below as of 2024 14:00  Patient On (Oxygen Delivery Method): room air        General: No acute distress    NEUROLOGICAL EXAM:  Mental status: Awake, alert, oriented x3, speech fluent, follows commands, no neglect, normal memory   Cranial Nerves: No facial asymmetry, no nystagmus, no dysarthria,  tongue midline  Motor exam: Normal tone, no drift, 5/5 RUE, 5/5 RLE, 5/5 LUE, 5/5 LLE, normal fine finger movements.  Sensation: Intact to light touch   Coordination/ Gait: No dysmetria, gait not tested    LABS:                        10.9   16.03 )-----------( 194      ( 2024 03:45 )             32.1    07-01    140  |  107  |  8.6  ----------------------------<  147<H>  3.2<L>   |  19.0<L>  |  0.48<L>    Ca    8.4      2024 03:45  Phos  2.2     07-01  Mg     1.7     07-01          IMAGING: Reviewed by me.      Preliminary note, official recommendations pending attending review/signature   [facility] Stroke Team  Progress Note     : Family at bedside per patient request  HPI:  80 y/o Female with PMHx significant for HTN, HLD and CAD s/p stent who takes ASA presented to [facility] the morning of 6/29/24 with AMS and urinary incontinence and her last known well approximately 530am. CTH at [place] revealed R parietal lobe hematoma. Patient was transferred to [facility] for further management. Of note, patient recieved 3mg ativan, haldol and 1g keppra prior to arrival at [facility] ED for both agitation and potential seizures. Once in ED, ED providers concerned for patient's airway as she was gargling with respirations. Decision made by ED staff to intubate patient for airway protection. ROS unable to be obtained as patient was in distress altered on exam.   GCS: 12 (E3, V4, M5) prior to intubation, ICH: 1, mRS: 0    SUBJECTIVE: Patient seen and examined at bedside in 3NOR. Patient sitting up in chair, family at bedside, in NAD. Patient reports Left shoulder pain with movement. No events overnight.  No new neurologic complaints.  ROS reported negative unless otherwise noted.    MEDICATIONS:  acetaminophen     Tablet .. 650 milliGRAM(s) Oral every 6 hours PRN  albuterol/ipratropium for Nebulization 3 milliLiter(s) Nebulizer every 6 hours PRN  atorvastatin 40 milliGRAM(s) Oral at bedtime  benzocaine/menthol Lozenge 1 Lozenge Oral every 4 hours PRN  chlorhexidine 2% Cloths 1 Application(s) Topical daily  enoxaparin Injectable 40 milliGRAM(s) SubCutaneous every 24 hours  hydrALAZINE Injectable 10 milliGRAM(s) IV Push every 2 hours PRN  labetalol Injectable 10 milliGRAM(s) IV Push every 2 hours PRN  levalbuterol Inhalation 0.63 milliGRAM(s) Inhalation every 6 hours PRN  levETIRAcetam 1000 milliGRAM(s) Oral two times a day  lidocaine   4% Patch 1 Patch Transdermal every 24 hours  losartan 50 milliGRAM(s) Oral daily  metoprolol tartrate 25 milliGRAM(s) Oral two times a day  montelukast 10 milliGRAM(s) Oral daily  polyethylene glycol 3350 17 Gram(s) Oral daily  predniSONE   Tablet 40 milliGRAM(s) Oral daily  senna Syrup 10 milliLiter(s) Oral at bedtime  sertraline 100 milliGRAM(s) Oral daily      Vital Signs Last 24 Hrs  T(C): 37.1 (01 Jul 2024 14:00), Max: 38.2 (30 Jun 2024 20:00)  T(F): 98.8 (01 Jul 2024 14:00), Max: 100.8 (30 Jun 2024 20:00)  HR: 89 (01 Jul 2024 14:00) (68 - 113)  BP: 131/59 (01 Jul 2024 14:00) (96/60 - 195/82)  BP(mean): 81 (01 Jul 2024 14:00) (67 - 116)  RR: 19 (01 Jul 2024 14:00) (12 - 31)  SpO2: 97% (01 Jul 2024 14:00) (93% - 100%)    Parameters below as of 01 Jul 2024 14:00  Patient On (Oxygen Delivery Method): room air      PHYSICAL EXAM:  General: No acute distress. Mild bruising to LUE.    NEUROLOGICAL EXAM:  Mental status: Awake and alert, normal attention span. Oriented x 4; oriented to self and place, when asked her age she states 45, when asked the month states "[birth year]" (some perseveration). Self-corrects year to 2024 with prompting. Speech fluent, follows commands, no neglect, normal memory   Cranial Nerves: No facial asymmetry, no nystagmus, no dysarthria,  tongue midline  Motor exam: Normal tone, no drift,  4/5 RUE, 5/5 RLE,   4/5 LUE (likely pain limited), 5/5 LLE  Impaired fine finger movements on Left.  Sensation: Intact to light touch   Coordination/ Gait: No dysmetria, gait not tested    LABS:                        10.9   16.03 )-----------( 194      ( 01 Jul 2024 03:45 )             32.1    07-01    140  |  107  |  8.6  ----------------------------<  147<H>  3.2<L>   |  19.0<L>  |  0.48<L>    Ca    8.4      01 Jul 2024 03:45  Phos  2.2     07-01  Mg     1.7     07-01    LDL 47  A1C 5.8    IMAGING:     CT Head No Cont (06.30.24 @ 09:41)  IMPRESSION: No change since 6/29/2024. Right small right parietal   cortical parenchymal hemorrhage.    CT Angio Head w/ IV Cont (06.29.24 @ 13:48)  Impression: Age-appropriate involutional and ischemic gliotic changes. No   change in right parietal parenchymal hemorrhage since 7:42 AM. Normal CTA   of the head and neck.    CT Head No Cont (06.29.24 @ 07:48)  IMPRESSION:  1. 1.7 cm acute juxtacortical hematoma with surrounding vasogenic edema,   right parietal lobe. Recommend follow-up imaging, as underlying lesion   cannot be excluded.  2. Moderate-severe white matter hypodensities are nonspecific however   statistically reflects chronic microvascular ischemic change.  3. Additional findings described in detail above.    TTE W or WO Ultrasound Enhancing Agent (06.30.24 @ 08:50)  CONCLUSIONS:   1. Left ventricular cavity is normal in size. Left ventricular systolic function is normal with an ejection fraction visually estimated at 60 to 65 %.   2. There is mild (grade 1) left ventricular diastolic dysfunction.   3. Normal right ventricular cavity size and normal right ventricular systolic function.   4. The left atrium is normal in size.   5. The right atrium is normal in size.   6. Structurally normal mitral valve with normal leaflet excursion.   7. No pericardial effusion seen.   8. Mild tricuspid regurgitation.   9. Estimated pulmonary artery systolic pressure is 38 mmHg.  10. Mild left ventricular hypertrophy.  11. Technically difficult image quality.  12. Trileaflet aortic valve with normal systolic excursion. Fibrocalcific aortic valve sclerosis without stenosis. Preliminary note, official recommendations pending attending review/signature   [facility] Stroke Team  Progress Note     : Family at bedside per patient request  HPI:  78 y/o Female with PMHx significant for HTN, HLD and CAD s/p stent who takes ASA presented to [facility] the morning of 6/29/24 with AMS and urinary incontinence and her last known well approximately 530am. CTH at [place] revealed R parietal lobe hematoma. Patient was transferred to [facility] for further management. Of note, patient recieved 3mg ativan, haldol and 1g keppra prior to arrival at [facility] ED for both agitation and potential seizures. Once in ED, ED providers concerned for patient's airway as she was gargling with respirations. Decision made by ED staff to intubate patient for airway protection. ROS unable to be obtained as patient was in distress altered on exam.   GCS: 12 (E3, V4, M5) prior to intubation, ICH: 1, mRS: 0    SUBJECTIVE: Patient seen and examined at bedside in 3NOR. Patient sitting up in chair, family at bedside, in NAD. Patient reports Left shoulder pain with movement. No events overnight.  No new neurologic complaints.  ROS reported negative unless otherwise noted.    MEDICATIONS:  acetaminophen     Tablet .. 650 milliGRAM(s) Oral every 6 hours PRN  albuterol/ipratropium for Nebulization 3 milliLiter(s) Nebulizer every 6 hours PRN  atorvastatin 40 milliGRAM(s) Oral at bedtime  benzocaine/menthol Lozenge 1 Lozenge Oral every 4 hours PRN  chlorhexidine 2% Cloths 1 Application(s) Topical daily  enoxaparin Injectable 40 milliGRAM(s) SubCutaneous every 24 hours  hydrALAZINE Injectable 10 milliGRAM(s) IV Push every 2 hours PRN  labetalol Injectable 10 milliGRAM(s) IV Push every 2 hours PRN  levalbuterol Inhalation 0.63 milliGRAM(s) Inhalation every 6 hours PRN  levETIRAcetam 1000 milliGRAM(s) Oral two times a day  lidocaine   4% Patch 1 Patch Transdermal every 24 hours  losartan 50 milliGRAM(s) Oral daily  metoprolol tartrate 25 milliGRAM(s) Oral two times a day  montelukast 10 milliGRAM(s) Oral daily  polyethylene glycol 3350 17 Gram(s) Oral daily  predniSONE   Tablet 40 milliGRAM(s) Oral daily  senna Syrup 10 milliLiter(s) Oral at bedtime  sertraline 100 milliGRAM(s) Oral daily      Vital Signs Last 24 Hrs  T(C): 37.1 (01 Jul 2024 14:00), Max: 38.2 (30 Jun 2024 20:00)  T(F): 98.8 (01 Jul 2024 14:00), Max: 100.8 (30 Jun 2024 20:00)  HR: 89 (01 Jul 2024 14:00) (68 - 113)  BP: 131/59 (01 Jul 2024 14:00) (96/60 - 195/82)  BP(mean): 81 (01 Jul 2024 14:00) (67 - 116)  RR: 19 (01 Jul 2024 14:00) (12 - 31)  SpO2: 97% (01 Jul 2024 14:00) (93% - 100%)    Parameters below as of 01 Jul 2024 14:00  Patient On (Oxygen Delivery Method): room air      PHYSICAL EXAM:  General: No acute distress. Mild bruising to LUE.    NEUROLOGICAL EXAM:  Mental status: Awake and alert, normal attention span. Oriented x 4; oriented to self and place, when asked her age she states 45, when asked the month states "July [birth year]" (some perseveration). Self-corrects year to 2024 with prompting. Speech fluent, follows commands, no neglect, normal memory   Cranial Nerves: No facial asymmetry, no nystagmus, no dysarthria,  tongue midline  Motor exam: Normal tone, no drift,  4/5 RUE, 5/5 RLE,   4/5 LUE (likely pain limited), 5/5 LLE  Impaired fine finger movements on Left.  Sensation: Intact to light touch   Coordination/ Gait: No dysmetria, gait not tested      NIH SS:    DATE: 7/1/24   TIME: 11:00am   1A: Level of consciousness (0-3): 0   1B: Questions (0-2): 1   1C: Commands (0-2): 0   2: Gaze (0-2): 0   3: Visual fields (0-3): 0   4: Facial palsy (0-3): 0   MOTOR:   5A: Left arm motor drift (0-4): 0   5B: Right arm motor drift (0-4): 0   6A: Left leg motor drift (0-4): 0   6B: Right leg motor drift (0-4): 0   7: Limb ataxia (0-2): 0   SENSORY:   8: Sensation (0-2): 0   SPEECH:   9: Language (0-3): 1   10: Dysarthria (0-2): 0   EXTINCTION:   11: Extinction/inattention (0-2): 0     TOTAL SCORE:  2  prehospital mRS= 0  ICH SCORE: 1      LABS:                        10.9   16.03 )-----------( 194      ( 01 Jul 2024 03:45 )             32.1    07-01    140  |  107  |  8.6  ----------------------------<  147<H>  3.2<L>   |  19.0<L>  |  0.48<L>    Ca    8.4      01 Jul 2024 03:45  Phos  2.2     07-01  Mg     1.7     07-01    LDL 47  A1C 5.8    IMAGING:     CT Head No Cont (06.30.24 @ 09:41)  IMPRESSION: No change since 6/29/2024. Right small right parietal   cortical parenchymal hemorrhage.    CT Angio Head w/ IV Cont (06.29.24 @ 13:48)  Impression: Age-appropriate involutional and ischemic gliotic changes. No   change in right parietal parenchymal hemorrhage since 7:42 AM. Normal CTA   of the head and neck.    CT Head No Cont (06.29.24 @ 07:48)  IMPRESSION:  1. 1.7 cm acute juxtacortical hematoma with surrounding vasogenic edema,   right parietal lobe. Recommend follow-up imaging, as underlying lesion   cannot be excluded.  2. Moderate-severe white matter hypodensities are nonspecific however   statistically reflects chronic microvascular ischemic change.  3. Additional findings described in detail above.    TTE W or WO Ultrasound Enhancing Agent (06.30.24 @ 08:50)  CONCLUSIONS:   1. Left ventricular cavity is normal in size. Left ventricular systolic function is normal with an ejection fraction visually estimated at 60 to 65 %.   2. There is mild (grade 1) left ventricular diastolic dysfunction.   3. Normal right ventricular cavity size and normal right ventricular systolic function.   4. The left atrium is normal in size.   5. The right atrium is normal in size.   6. Structurally normal mitral valve with normal leaflet excursion.   7. No pericardial effusion seen.   8. Mild tricuspid regurgitation.   9. Estimated pulmonary artery systolic pressure is 38 mmHg.  10. Mild left ventricular hypertrophy.  11. Technically difficult image quality.  12. Trileaflet aortic valve with normal systolic excursion. Fibrocalcific aortic valve sclerosis without stenosis.

## 2024-07-01 NOTE — CHART NOTE - NSCHARTNOTEFT_GEN_A_CORE
NSCU Transfer Note    [place] [facility]    Transfer to: (  ) Medicine    (  ) Telemetry    (  ) RCU   ( ) Neurosurgery                               (  ) Palliative    ( x ) Stroke Unit    (  ) MICU    (  ) __________________    Accepting Physician:    Signout given to:     HPI / NSCU COURSE:  79F with MHx significant for HTN, HLD and CAD s/p stent who takes ASA presented to [facility] this morning with AMS and urinary incontinence and her last known well approximately 530am. CTH at [place] revealed R parietal lobe hematoma. Patient was transferred to [facility] for further management. Of note, patient recieved 3mg ativan, haldol and 1g keppra prior to arrival at [facility] ED for both agitation and potential seizures. Once in ED, ED providers concerned for patient's airway as she was gargling with respirations. Decision made by ED staff to intubate patient for airway protection. ROS unable to be obtained as patient was in distress altered on exam.   GCS: 12 (E3, V4, M5) prior to intubation, ICH: 1, mRS: 0   (29 Jun 2024 14:08)    6/29: weaned off Propofol, negative cEEG.  6/30: Extubated this am.        Vital Signs Last 24 Hrs  T(C): 36.1 (01 Jul 2024 04:00), Max: 38.3 (30 Jun 2024 10:30)  T(F): 97 (01 Jul 2024 04:00), Max: 100.9 (30 Jun 2024 10:30)  HR: 87 (01 Jul 2024 04:00) (65 - 113)  BP: 153/80 (01 Jul 2024 04:00) (100/80 - 195/82)  BP(mean): 102 (01 Jul 2024 04:00) (73 - 132)  RR: 16 (01 Jul 2024 04:00) (12 - 31)  SpO2: 100% (01 Jul 2024 04:00) (93% - 100%)    Parameters below as of 01 Jul 2024 04:00  Patient On (Oxygen Delivery Method): room air        I&O's Summary    29 Jun 2024 07:01  -  30 Jun 2024 07:00  --------------------------------------------------------  IN: 1496 mL / OUT: 860 mL / NET: 636 mL    30 Jun 2024 07:01  -  01 Jul 2024 04:33  --------------------------------------------------------  IN: 2425 mL / OUT: 1195 mL / NET: 1230 mL        Physical Exam:   AOx3, FC, hypophonic, face symmetric, PERRLA, EOMI, motor - LUE 3/5 with drift, RUE 5/5, BL LE 2/5 effort dependent.  CTAB, no wheezing, no stridor.  S1S2 present  Abd soft, NT, ND  No peripheral swelling.    LABS:   CARDIAC MARKERS ( 29 Jun 2024 07:15 )  x     / x     / 104 U/L / x     / x                                  10.9   16.03 )-----------( 194      ( 01 Jul 2024 03:45 )             32.1       07-01    140  |  107  |  8.6  ----------------------------<  147<H>  3.2<L>   |  19.0<L>  |  0.48<L>    Ca    8.4      01 Jul 2024 03:45  Phos  2.2     07-01  Mg     1.7     07-01    TPro  7.9  /  Alb  3.9  /  TBili  0.8  /  DBili  x   /  AST  33<H>  /  ALT  15  /  AlkPhos  98  06-29      PT/INR - ( 29 Jun 2024 12:07 )   PT: 11.8 sec;   INR: 1.07 ratio         PTT - ( 29 Jun 2024 12:07 )  PTT:32.9 sec    ABG - ( 30 Jun 2024 04:00 )  pH, Arterial: 7.420 pH, Blood: x     /  pCO2: 37    /  pO2: 73    / HCO3: 24    / Base Excess: -0.5  /  SaO2: 98.2        Imaging:    < from: CT Head No Cont (06.30.24 @ 09:41) >    IMPRESSION: No change since 6/29/2024. Right small right parietal   cortical parenchymal hemorrhage.    --- End of Report ---            MARTIN GALINDO MD; Attending Radiologist  This document has been electronically signed. Jun 30 2024 12:55PM    < end of copied text >    < from: CT Head No Cont (06.29.24 @ 13:43) >    Impression: Age-appropriate involutional and ischemic gliotic changes. No   change in right parietal parenchymal hemorrhage since 7:42 AM. Normal CTA   of the head and neck.    --- End of Report ---            MARTIN GALINDO MD; Attending Radiologist  This document has been electronically signed. Jun 29 2024  1:57PM    < end of copied text >    < from: CT Head No Cont (06.29.24 @ 07:48) >    IMPRESSION:  1. 1.7 cm acute juxtacortical hematoma with surrounding vasogenic edema,   right parietal lobe. Recommend follow-up imaging, as underlying lesion   cannot be excluded.  2. Moderate-severe white matter hypodensities are nonspecific however   statistically reflects chronic microvascular ischemic change.  3. Additional findings described in detail above.    Results discussed with Dr. Mon for the patient at 10:05 AM the day of   this exam.    --- End of Report ---            CHAZ FELIX M.D., ATTENDING RADIOLOGIST  This document has been electronically signed. Jun 29 2024 10:07AM    < end of copied text >          ASSESSMENT & PLAN:  79F with acute encephalopathy, suspected seizures.  Acute ?spont R parietal ICH, CTA unremarkable.  PMH of HTN, HLD and CAD s/p stents.  Troponin elevation, plateaued.   Acute resp failure due to neurologic condition. Extubated 6/30.  Leukocytosis, likely due to seizures, resolving.    Plan:  neurochecks per primary team  DC cEEG if negative  cont Keppra 1gr BID IV; Neurology f/up  stroke core measures, MRI w/ and w/o  maintain SBP   maintain Osats>92%, incentive spirometry, xopenex q 6h for 24 hrs  Vegan diet  Sputum Cx: GM+ cocci in pairs  SCDs, SQL

## 2024-07-01 NOTE — CDI QUERY NOTE - NSCDIOTHERTXTBX_GEN_ALL_CORE_HH
This patient was found to have vasogenic edema on imaging which is only documented once in the record. Please clarify if vasogenic edema is a valid diagnosis this admission:    -Vasogenic edema is a valid diagnosis this admission  -Other (please specify)  -Not clinically significant      SUPPORTING DOCUMENTATION AND FINDINGS:    6/29 ED Provider: ...  brought in by EMS as a transfer from [facility] for a right-sided parietal lobe bleed with vasogenic edema...     6/29 H&P: ... presented to [facility] this morning with AMS and urinary incontinence and her last known well approximately 530am. CTH at [place] revealed R parietal lobe hematoma. Patient was transferred to [facility] for further management. Of note, patient recieved 3mg ativan, haldol and 1g keppra prior to arrival at [facility] ED for both agitation and potential seizures...    6/30 Neurology: ... presented to [place] with altered mental status, and urinary incontinence. CT noted to have intracranial hemorrhage. She was transferred to [facility] neuro-ICU for c-EEG and management... intracranial hemorrhage and seizure...      < from: CT Head No Cont (06.29.24 @ 07:48) >  IMPRESSION:  1. 1.7 cm acute juxtacortical hematoma with surrounding vasogenic edema, right parietal lobe. Recommend follow-up imaging, as underlying lesion cannot be excluded.  2. Moderate-severe white matter hypodensities are nonspecific however statistically reflects chronic microvascular ischemic change.  3. Additional findings described in detail above.  < end of copied text >

## 2024-07-01 NOTE — PHYSICAL THERAPY INITIAL EVALUATION ADULT - COORDINATION ASSESSED, REHAB EVAL
finger to nose intact on left (kept range low due to c/o shoulder pain), mild impairments noted with right UE more prominent on left side

## 2024-07-01 NOTE — CHART NOTE - NSCHARTNOTEFT_GEN_A_CORE
NSICU DOWNGRADE NOTE     HPI:  79F with MHx significant for HTN, HLD and CAD s/p stent who takes ASA presented to [facility] this morning with AMS and urinary incontinence and her last known well approximately 530am. CTH at [place] revealed R parietal lobe hematoma. Patient was transferred to [facility] for further management. Of note, patient recieved 3mg ativan, haldol and 1g keppra prior to arrival at [facility] ED for both agitation and potential seizures. Once in ED, ED providers concerned for patient's airway as she was gargling with respirations. Decision made by ED staff to intubate patient for airway protection. ROS unable to be obtained as patient was in distress altered on exam.   GCS: 12 (E3, V4, M5) prior to intubation, ICH: 1, mRS: 0   (29 Jun 2024 14:08)    OVERNIGHT/INTERVAL EVENTS: Tmax 38.2, fever workup sent 6/29. Patient extubated 6/30, given steroids for stridor. Pending MRI brain w+w/o contrast. Patient c/o b/l shoulder pain, shoulder XR done, pending final read. dc'd EEG.     Vital Signs Last 24 Hrs  T(C): 37.1 (01 Jul 2024 13:00), Max: 38.2 (30 Jun 2024 20:00)  T(F): 98.8 (01 Jul 2024 13:00), Max: 100.8 (30 Jun 2024 20:00)  HR: 84 (01 Jul 2024 13:00) (68 - 113)  BP: 142/74 (01 Jul 2024 13:00) (96/60 - 195/82)  BP(mean): 95 (01 Jul 2024 13:00) (67 - 116)  RR: 21 (01 Jul 2024 13:00) (12 - 31)  SpO2: 100% (01 Jul 2024 13:00) (93% - 100%)    Parameters below as of 01 Jul 2024 12:00  Patient On (Oxygen Delivery Method): room air        I&O's Summary    30 Jun 2024 07:01  -  01 Jul 2024 07:00  --------------------------------------------------------  IN: 2723.1 mL / OUT: 1325 mL / NET: 1398.1 mL    01 Jul 2024 07:01  -  01 Jul 2024 13:16  --------------------------------------------------------  IN: 150 mL / OUT: 190 mL / NET: -40 mL        PHYSICAL EXAM:  General: NAD, pt is comfortably sitting up in bed, on room air  HEENT: EOMI b/l, face symmetric, tongue midline, neck FROM  Cardiovascular: Regular rate and rhythm  Respiratory: nonlabored breathing, normal chest rise  GI: abdomen soft, nontender, nondistended  Neuro: PERRL 3mm briskly reactive, A&Ox3, No aphasia, speech clear  No dysmetria, no pronator drift. Follows commands.  HERNANDES x4 spontaneously, b/l UE 4/5, b/l LE effort dependent 3/5. sensation intact  Extremities: distal pulses 2+ x4      LABS:                        10.9   16.03 )-----------( 194      ( 01 Jul 2024 03:45 )             32.1     07-01    140  |  107  |  8.6  ----------------------------<  147<H>  3.2<L>   |  19.0<L>  |  0.48<L>    Ca    8.4      01 Jul 2024 03:45  Phos  2.2     07-01  Mg     1.7     07-01  Urinalysis Basic - ( 01 Jul 2024 03:45 )  Color: x / Appearance: x / SG: x / pH: x  Gluc: 147 mg/dL / Ketone: x  / Bili: x / Urobili: x   Blood: x / Protein: x / Nitrite: x   Leuk Esterase: x / RBC: x / WBC x   Sq Epi: x / Non Sq Epi: x / Bacteria: x    Allergies  penicillins (Unknown)    MEDICATIONS:  levETIRAcetam 1000 milliGRAM(s) Oral two times a day  sertraline 100 milliGRAM(s) Oral daily  enoxaparin Injectable 40 milliGRAM(s) SubCutaneous every 24 hours  albuterol/ipratropium for Nebulization 3 milliLiter(s) Nebulizer every 6 hours PRN  atorvastatin 40 milliGRAM(s) Oral at bedtime  benzocaine/menthol Lozenge 1 Lozenge Oral every 4 hours PRN  chlorhexidine 2% Cloths 1 Application(s) Topical daily  hydrALAZINE Injectable 10 milliGRAM(s) IV Push every 2 hours PRN  labetalol Injectable 10 milliGRAM(s) IV Push every 2 hours PRN  levalbuterol Inhalation 0.63 milliGRAM(s) Inhalation every 6 hours PRN  lidocaine   4% Patch 1 Patch Transdermal every 24 hours  losartan 50 milliGRAM(s) Oral daily  metoprolol tartrate 25 milliGRAM(s) Oral two times a day  montelukast 10 milliGRAM(s) Oral daily  polyethylene glycol 3350 17 Gram(s) Oral daily  predniSONE   Tablet 40 milliGRAM(s) Oral daily  senna Syrup 10 milliLiter(s) Oral at bedtime    CULTURES:  Culture Results:   Normal Respiratory Rayshawn present (06-30 @ 10:32)  Culture Results:   No growth at 24 hours (06-29 @ 15:00)    RADIOLOGY & ADDITIONAL TESTS:  < from: CT Head No Cont (06.30.24 @ 09:41) >  IMPRESSION: No change since 6/29/2024. Right small right parietal   cortical parenchymal hemorrhage.    < from: CT Angio Neck w/ IV Cont (06.29.24 @ 13:50) >  Impression: Age-appropriate involutional and ischemic gliotic changes. No   change in right parietal parenchymal hemorrhage since 7:42 AM. Normal CTA   of the head and neck.      ASSESSMENT:  79F PMHx of HLD and CAD s/p stent on ASA presented to [place] ED 6/29 with AMS and urinary incontinence, LKW 5:30AM. Initial CTH revealed 1.7cm R parietal IPH. Patient given Keppra, Ativan and Haldol for agitation and transferred to [facility] for further management. Upon arrival to [facility], patient was intubated for airway protection and extubated 6/30.     PLAN:   NEURO:   - neuro checks q4hrs   - dc'd EEG 7/1, negative for seizures   - keppra 1g BID   - Sertraline 100mg  - pain control: lidocaine patch, tylenol PRN  - neurosurgery signed off     CV:   - SBP    - Losartan 50mg  - Metoprolol 25mg BID   - Atorvastatin 40mg, LDL 47  - PRN: Hydralazine, Labetalol   - echo 6/30: EF 60-65%, G1DD, mild TR, mild LVH  - cardiology consulted 6/29 for elevated troponin, downtrending 119->62    Resp:   - tolerating RA  - Xopenex q6   - monteleukast 10  - PRN: duonebs   - s/p solumedrol 125mg after extubation 6/30    GI:   - Vegan diet  - LBM: outpatient  - bowel regimen: Senna, miralax     :   - TOV   - IVL     Heme:   - DVT ppx: SCDs, SQL 40mg     ID:  - 6/29: blood cx: NGTD, UA: negative, urine cx: normal urogenital rayshawn   - 6/30: sputum cx: normal respiratory rayshawn   - lactate: 8.8 -> 3.0 -> 1.5    Endo:   - A1C: 5.8   - TSH: 2.35   - prednisone 40x3 days (7/1-7/3)    DISPO:   - PT: acute rehab   - pending PM&R   - downgraded to stroke neurology telemetry     D/w Dr. Mcwilliams and  ____ NSICU DOWNGRADE NOTE     HPI:  79F with MHx significant for HTN, HLD and CAD s/p stent who takes ASA presented to [facility] this morning with AMS and urinary incontinence and her last known well approximately 530am. CTH at [place] revealed R parietal lobe hematoma. Patient was transferred to [facility] for further management. Of note, patient recieved 3mg ativan, haldol and 1g keppra prior to arrival at [facility] ED for both agitation and potential seizures. Once in ED, ED providers concerned for patient's airway as she was gargling with respirations. Decision made by ED staff to intubate patient for airway protection. ROS unable to be obtained as patient was in distress altered on exam.   GCS: 12 (E3, V4, M5) prior to intubation, ICH: 1, mRS: 0   (29 Jun 2024 14:08)    OVERNIGHT/INTERVAL EVENTS: Tmax 38.2, fever workup sent 6/29. Patient extubated 6/30, given steroids for stridor. Pending MRI brain w+w/o contrast. Patient c/o L shoulder pain, b/l shoulder XR done, pending final read. dc'd EEG.     Vital Signs Last 24 Hrs  T(C): 37.1 (01 Jul 2024 13:00), Max: 38.2 (30 Jun 2024 20:00)  T(F): 98.8 (01 Jul 2024 13:00), Max: 100.8 (30 Jun 2024 20:00)  HR: 84 (01 Jul 2024 13:00) (68 - 113)  BP: 142/74 (01 Jul 2024 13:00) (96/60 - 195/82)  BP(mean): 95 (01 Jul 2024 13:00) (67 - 116)  RR: 21 (01 Jul 2024 13:00) (12 - 31)  SpO2: 100% (01 Jul 2024 13:00) (93% - 100%)    Parameters below as of 01 Jul 2024 12:00  Patient On (Oxygen Delivery Method): room air      I&O's Summary    30 Jun 2024 07:01  -  01 Jul 2024 07:00  --------------------------------------------------------  IN: 2723.1 mL / OUT: 1325 mL / NET: 1398.1 mL    01 Jul 2024 07:01  -  01 Jul 2024 13:16  --------------------------------------------------------  IN: 150 mL / OUT: 190 mL / NET: -40 mL      PHYSICAL EXAM:  General: NAD, pt is comfortably sitting up in bed, on room air  HEENT: EOMI b/l, face symmetric, tongue midline, neck FROM  Cardiovascular: Regular rate and rhythm  Respiratory: nonlabored breathing, normal chest rise  GI: abdomen soft, nontender, nondistended  Neuro: PERRL 3mm briskly reactive, A&Ox3, No aphasia, speech clear  No dysmetria, no pronator drift. Follows commands.  HERNANDES x4 spontaneously, LUE 4/5 (pain limited) +drift, RUE 5/5, b/l LE 5/5. sensation intact  Extremities: distal pulses 2+ x4      LABS:                        10.9   16.03 )-----------( 194      ( 01 Jul 2024 03:45 )             32.1     07-01    140  |  107  |  8.6  ----------------------------<  147<H>  3.2<L>   |  19.0<L>  |  0.48<L>    Ca    8.4      01 Jul 2024 03:45  Phos  2.2     07-01  Mg     1.7     07-01  Urinalysis Basic - ( 01 Jul 2024 03:45 )  Color: x / Appearance: x / SG: x / pH: x  Gluc: 147 mg/dL / Ketone: x  / Bili: x / Urobili: x   Blood: x / Protein: x / Nitrite: x   Leuk Esterase: x / RBC: x / WBC x   Sq Epi: x / Non Sq Epi: x / Bacteria: x    Allergies  penicillins (Unknown)    MEDICATIONS:  levETIRAcetam 1000 milliGRAM(s) Oral two times a day  sertraline 100 milliGRAM(s) Oral daily  enoxaparin Injectable 40 milliGRAM(s) SubCutaneous every 24 hours  albuterol/ipratropium for Nebulization 3 milliLiter(s) Nebulizer every 6 hours PRN  atorvastatin 40 milliGRAM(s) Oral at bedtime  benzocaine/menthol Lozenge 1 Lozenge Oral every 4 hours PRN  chlorhexidine 2% Cloths 1 Application(s) Topical daily  hydrALAZINE Injectable 10 milliGRAM(s) IV Push every 2 hours PRN  labetalol Injectable 10 milliGRAM(s) IV Push every 2 hours PRN  levalbuterol Inhalation 0.63 milliGRAM(s) Inhalation every 6 hours PRN  lidocaine   4% Patch 1 Patch Transdermal every 24 hours  losartan 50 milliGRAM(s) Oral daily  metoprolol tartrate 25 milliGRAM(s) Oral two times a day  montelukast 10 milliGRAM(s) Oral daily  polyethylene glycol 3350 17 Gram(s) Oral daily  predniSONE   Tablet 40 milliGRAM(s) Oral daily  senna Syrup 10 milliLiter(s) Oral at bedtime    CULTURES:  Culture Results:   Normal Respiratory Rayshawn present (06-30 @ 10:32)  Culture Results:   No growth at 24 hours (06-29 @ 15:00)    RADIOLOGY & ADDITIONAL TESTS:  < from: CT Head No Cont (06.30.24 @ 09:41) >  IMPRESSION: No change since 6/29/2024. Right small right parietal   cortical parenchymal hemorrhage.    < from: CT Angio Neck w/ IV Cont (06.29.24 @ 13:50) >  Impression: Age-appropriate involutional and ischemic gliotic changes. No   change in right parietal parenchymal hemorrhage since 7:42 AM. Normal CTA   of the head and neck.      ASSESSMENT:  79F PMHx of HLD and CAD s/p stent on ASA presented to [place] ED 6/29 with AMS and urinary incontinence, LKW 5:30AM. Initial CTH revealed 1.7cm R parietal IPH. Patient given Keppra, Ativan and Haldol for agitation and transferred to [facility] for further management. Upon arrival to [facility], patient was intubated for airway protection and extubated 6/30.     PLAN:   NEURO:   - neuro checks q4hrs   - dc'd EEG 7/1, negative for seizures   - keppra 1g BID   - Sertraline 100mg  - pain control: lidocaine patch, tylenol PRN  - neurosurgery signed off     CV:   - SBP    - Losartan 50mg  - Metoprolol 25mg BID   - Atorvastatin 40mg, LDL 47  - PRN: Hydralazine, Labetalol   - echo 6/30: EF 60-65%, G1DD, mild TR, mild LVH  - cardiology consulted 6/29 for elevated troponin, downtrending 119->62    Resp:   - tolerating RA  - Xopenex q6   - monteleukast 10  - PRN: duonebs   - s/p solumedrol 125mg after extubation 6/30  - prednisone 40mg x3 days (7/1-7/3)    GI:   - Vegan diet  - LBM: outpatient  - bowel regimen: Senna, miralax     :   - TOV   - IVL     Heme:   - DVT ppx: SCDs, SQL 40mg   - pending b/l LE duplex     ID:  - 6/29: blood cx: NGTD, UA: negative, urine cx: normal urogenital rayshawn   - 6/30: sputum cx: normal respiratory rayshawn   - lactate: 8.8 -> 3.0 -> 1.5    Endo:   - A1C: 5.8   - TSH: 2.35     DISPO:   - PT: acute rehab   - pending PM&R   - downgraded to stroke neurology telemetry     D/w Dr. Mcwilliams and Dr. Lenz NSICU DOWNGRADE NOTE     HPI:  79F with MHx significant for HTN, HLD and CAD s/p stent who takes ASA presented to [facility] this morning with AMS and urinary incontinence and her last known well approximately 530am. CTH at [place] revealed R parietal lobe hematoma. Patient was transferred to [facility] for further management. Of note, patient recieved 3mg ativan, haldol and 1g keppra prior to arrival at [facility] ED for both agitation and potential seizures. Once in ED, ED providers concerned for patient's airway as she was gargling with respirations. Decision made by ED staff to intubate patient for airway protection. ROS unable to be obtained as patient was in distress altered on exam.   GCS: 12 (E3, V4, M5) prior to intubation, ICH: 1, mRS: 0   (29 Jun 2024 14:08)    OVERNIGHT/INTERVAL EVENTS: Tmax 38.2, fever workup sent 6/29. Patient extubated 6/30, given steroids for stridor. Pending MRI brain w+w/o contrast. Patient c/o L shoulder pain, b/l shoulder XR done, pending final read. dc'd EEG.     Vital Signs Last 24 Hrs  T(C): 37.1 (01 Jul 2024 13:00), Max: 38.2 (30 Jun 2024 20:00)  T(F): 98.8 (01 Jul 2024 13:00), Max: 100.8 (30 Jun 2024 20:00)  HR: 84 (01 Jul 2024 13:00) (68 - 113)  BP: 142/74 (01 Jul 2024 13:00) (96/60 - 195/82)  BP(mean): 95 (01 Jul 2024 13:00) (67 - 116)  RR: 21 (01 Jul 2024 13:00) (12 - 31)  SpO2: 100% (01 Jul 2024 13:00) (93% - 100%)    Parameters below as of 01 Jul 2024 12:00  Patient On (Oxygen Delivery Method): room air      I&O's Summary    30 Jun 2024 07:01  -  01 Jul 2024 07:00  --------------------------------------------------------  IN: 2723.1 mL / OUT: 1325 mL / NET: 1398.1 mL    01 Jul 2024 07:01  -  01 Jul 2024 13:16  --------------------------------------------------------  IN: 150 mL / OUT: 190 mL / NET: -40 mL      PHYSICAL EXAM:  General: NAD, pt is comfortably sitting up in bed, on room air  HEENT: EOMI b/l, face symmetric, tongue midline, neck FROM  Cardiovascular: Regular rate and rhythm  Respiratory: nonlabored breathing, normal chest rise  GI: abdomen soft, nontender, nondistended  Neuro: PERRL 3mm briskly reactive, A&Ox3, No aphasia, speech clear  No dysmetria, no pronator drift. Follows commands.  HERNANDES x4 spontaneously, LUE 4/5 (pain limited) +drift, RUE 5/5, b/l LE 5/5. sensation intact  Extremities: distal pulses 2+ x4      LABS:                        10.9   16.03 )-----------( 194      ( 01 Jul 2024 03:45 )             32.1     07-01    140  |  107  |  8.6  ----------------------------<  147<H>  3.2<L>   |  19.0<L>  |  0.48<L>    Ca    8.4      01 Jul 2024 03:45  Phos  2.2     07-01  Mg     1.7     07-01  Urinalysis Basic - ( 01 Jul 2024 03:45 )  Color: x / Appearance: x / SG: x / pH: x  Gluc: 147 mg/dL / Ketone: x  / Bili: x / Urobili: x   Blood: x / Protein: x / Nitrite: x   Leuk Esterase: x / RBC: x / WBC x   Sq Epi: x / Non Sq Epi: x / Bacteria: x    Allergies  penicillins (Unknown)    MEDICATIONS:  levETIRAcetam 1000 milliGRAM(s) Oral two times a day  sertraline 100 milliGRAM(s) Oral daily  enoxaparin Injectable 40 milliGRAM(s) SubCutaneous every 24 hours  albuterol/ipratropium for Nebulization 3 milliLiter(s) Nebulizer every 6 hours PRN  atorvastatin 40 milliGRAM(s) Oral at bedtime  benzocaine/menthol Lozenge 1 Lozenge Oral every 4 hours PRN  chlorhexidine 2% Cloths 1 Application(s) Topical daily  hydrALAZINE Injectable 10 milliGRAM(s) IV Push every 2 hours PRN  labetalol Injectable 10 milliGRAM(s) IV Push every 2 hours PRN  levalbuterol Inhalation 0.63 milliGRAM(s) Inhalation every 6 hours PRN  lidocaine   4% Patch 1 Patch Transdermal every 24 hours  losartan 50 milliGRAM(s) Oral daily  metoprolol tartrate 25 milliGRAM(s) Oral two times a day  montelukast 10 milliGRAM(s) Oral daily  polyethylene glycol 3350 17 Gram(s) Oral daily  predniSONE   Tablet 40 milliGRAM(s) Oral daily  senna Syrup 10 milliLiter(s) Oral at bedtime    CULTURES:  Culture Results:   Normal Respiratory Rayshawn present (06-30 @ 10:32)  Culture Results:   No growth at 24 hours (06-29 @ 15:00)    RADIOLOGY & ADDITIONAL TESTS:  < from: CT Head No Cont (06.30.24 @ 09:41) >  IMPRESSION: No change since 6/29/2024. Right small right parietal   cortical parenchymal hemorrhage.    < from: CT Angio Neck w/ IV Cont (06.29.24 @ 13:50) >  Impression: Age-appropriate involutional and ischemic gliotic changes. No   change in right parietal parenchymal hemorrhage since 7:42 AM. Normal CTA   of the head and neck.      ASSESSMENT:  79F PMHx of HLD and CAD s/p stent on ASA presented to [place] ED 6/29 with AMS and urinary incontinence, LKW 5:30AM. Initial CTH revealed 1.7cm R parietal IPH. Patient given Keppra, Ativan and Haldol for agitation and transferred to [facility] for further management. Upon arrival to [facility], patient was intubated for airway protection and extubated 6/30.     PLAN:   NEURO:   - neuro checks q4hrs   - dc'd EEG 7/1, negative for seizures   - keppra 1g BID   - Sertraline 100mg  - pain control: lidocaine patch, tylenol PRN  - neurosurgery signed off     CV:   - SBP    - Losartan 50mg  - Metoprolol 25mg BID   - Atorvastatin 40mg, LDL 47  - PRN: Hydralazine, Labetalol   - echo 6/30: EF 60-65%, G1DD, mild TR, mild LVH  - cardiology consulted 6/29 for elevated troponin, downtrending 119->62    Resp:   - tolerating RA  - Xopenex q6   - monteleukast 10  - PRN: duonebs   - s/p solumedrol 125mg after extubation 6/30  - prednisone 40mg x3 days (7/1-7/3)    GI:   - Vegan diet  - LBM: outpatient  - bowel regimen: Senna, miralax     :   - TOV   - IVL     Heme:   - DVT ppx: SCDs, SQL 40mg   - pending b/l LE duplex     ID:  - 6/29: blood cx: NGTD, UA: negative, urine cx: normal urogenital rayshawn   - 6/30: sputum cx: normal respiratory rayshawn   - lactate: 8.8 -> 3.0 -> 1.5    Endo:   - A1C: 5.8   - TSH: 2.35     DISPO:   - PT: acute rehab   - pending PM&R   - downgraded to stroke neurology telemetry     D/w Dr. Mcwilliams and Dr. Lenz    ----------------------------------------------    ATTENDING ATTESTATION    79F with acute encephalopathy, suspected seizures; resolved.  Acute ?spont R parietal ICH, CTA unremarkable.  PMH of HTN, HLD and CAD s/p stents.  Suspected reactive airway disease (pt on Singulair).  Troponin elevation, trending down; suspected non-ACS, stroke-related.  Acute resp failure due to neurologic condition. Extubated 6/30.  Leukocytosis, due to steroid use.    Plan:  neurochecks  d/c cEEG; cont Keppra 1gr BID; Neurology f/up  pending MRI w/ and w/o  maintain SBP , meds adjusted  maintain Osats>92%, incentive spirometry, albuterol q6h for 24 hrs; cont Prednisone 40 PO daily for 3 days  diet as tolerated, BM regimen, PPI as on steroids  monitor e-lytes and UOP; TOV, d/c IV fluids  sepsis w/up in progress, negative so far  SCDs, SQL for VTE ppx      Time spent - 35 min, non-critical, review of relevant history, clinical examination, review of data and images, discussion of treatment with the multidisciplinary team and any consultants involved in this patient’s care as well as family discussion.

## 2024-07-01 NOTE — DIETITIAN INITIAL EVALUATION ADULT - PERTINENT LABORATORY DATA
07-01    140  |  107  |  8.6  ----------------------------<  147<H>  3.2<L>   |  19.0<L>  |  0.48<L>    Ca    8.4      01 Jul 2024 03:45  Phos  2.2     07-01  Mg     1.7     07-01    TPro  7.9  /  Alb  3.9  /  TBili  0.8  /  DBili  x   /  AST  33<H>  /  ALT  15  /  AlkPhos  98  06-29  POCT Blood Glucose.: 121 mg/dL (06-30-24 @ 11:59)  A1C with Estimated Average Glucose Result: 5.8 % (06-29-24 @ 16:00)

## 2024-07-01 NOTE — DIETITIAN INITIAL EVALUATION ADULT - PERTINENT MEDS FT
MEDICATIONS  (STANDING):  atorvastatin 40 milliGRAM(s) Oral at bedtime  chlorhexidine 2% Cloths 1 Application(s) Topical daily  dexMEDEtomidine Infusion 0.2 MICROgram(s)/kG/Hr (3.87 mL/Hr) IV Continuous <Continuous>  enoxaparin Injectable 40 milliGRAM(s) SubCutaneous every 24 hours  levalbuterol Inhalation 0.31 milliGRAM(s) Inhalation every 6 hours  levETIRAcetam   Injectable 1000 milliGRAM(s) IV Push every 12 hours  lidocaine   4% Patch 1 Patch Transdermal every 24 hours  losartan 50 milliGRAM(s) Oral daily  metoprolol tartrate 25 milliGRAM(s) Oral two times a day  montelukast 10 milliGRAM(s) Oral daily  pantoprazole  Injectable 40 milliGRAM(s) IV Push daily  senna Syrup 10 milliLiter(s) Oral at bedtime  sertraline 150 milliGRAM(s) Oral daily  sodium chloride 0.9%. 1000 milliLiter(s) (75 mL/Hr) IV Continuous <Continuous>    MEDICATIONS  (PRN):  hydrALAZINE Injectable 10 milliGRAM(s) IV Push every 2 hours PRN Sbp >160  labetalol Injectable 10 milliGRAM(s) IV Push every 2 hours PRN SBP>160

## 2024-07-01 NOTE — EEG REPORT - NS EEG TEXT BOX
[facility]   COMPREHENSIVE EPILEPSY CENTER   REPORT OF CONTINUOUS VIDEO EEG     [facility]: [address], [place] [address], Ph#: [phone]  [facility]: [id number], [place] [address], Ph#: [phone]  [facility]: [address] [place] [address], Ph#: [phone]    Patient Name: BIANCA IZQUIERDO  Age and : 79y ([birth year])  MRN #: [id number]  Location: [place] [address]  Referring Physician: Esther Mcwilliams    Start Time/Date: 0800 on 2024  End Time/Date: 08:00 on 2021  Duration: 24H    _____________________________________________________________  STUDY INFORMATION    EEG Recording Technique:  The patient underwent continuous Video-EEG monitoring, using Telemetry System hardware on the XLTek Digital System. EEG and video data were stored on a computer hard drive with important events saved in digital archive files. The material was reviewed by a physician (electroencephalographer / epileptologist) on a daily basis. Jose Roberto and seizure detection algorithms were utilized and reviewed. An EEG Technician attended to the patient, and was available throughout daytime work hours.  The epilepsy center neurologist was available in person or on call 24-hours per day.    EEG Placement and Labeling of Electrodes:  The EEG was performed utilizing 20 channel referential EEG connections (coronal over temporal over parasagittal montage) using all standard 10-20 electrode placements with EKG, with additional electrodes placed in the inferior temporal region using the modified 10-10 montage electrode placements for elective admissions, or if deemed necessary. Recording was at a sampling rate of 256 samples per second per channel. Time synchronized digital video recording was done simultaneously with EEG recording. A low light infrared camera was used for low light recording.     _____________________________________________________________  HISTORY    Patient is a 79y old  Female who presents with a chief complaint of IPH ?seizure (2024 09:49)    _____________________________________________________________  STUDY INTERPRETATION    Findings: The background was continuous, spontaneously variable and reactive.   No posterior dominant rhythm seen.  Background predominantly consisted of theta, delta and faster activities.    Focal Slowing:   None were present.    Sleep Background:  Drowsiness and stage II sleep transients were not recorded.    Other Non-Epileptiform Findings:  Periodic frontally predominant broadly distributed triphasic waveforms present.    Interictal Epileptiform Activity:   None were present.    Events:  Clinical events: None recorded.  Seizures: None recorded.    Activation Procedures:   Hyperventilation was not performed.    Photic stimulation was not performed.     Artifacts:  Intermittent myogenic and movement artifacts were noted.  _____________________________________________________________  EEG SUMMARY/CLASSIFICATION    Abnormal EEG   - GPDs with triphasic morphology.  - Moderate generalized slowing.    _____________________________________________________________  EEG IMPRESSION/CLINICAL CORRELATE    Abnormal EEG study.  Moderate nonspecific diffuse or multifocal cerebral dysfunction.   Triphasic waveforms likely secondary to underlying non-specific encephalopathy.  No clear epileptiform pattern or seizure seen.    Note: Electrodes removed at 05:10AM.    Velasquez Vela MD   of Neurology  Epilepsy/EEG Attending

## 2024-07-02 ENCOUNTER — TRANSCRIPTION ENCOUNTER (OUTPATIENT)
Age: 79
End: 2024-07-02

## 2024-07-02 DIAGNOSIS — J96.01 ACUTE RESPIRATORY FAILURE WITH HYPOXIA: ICD-10-CM

## 2024-07-02 DIAGNOSIS — I50.31 ACUTE DIASTOLIC (CONGESTIVE) HEART FAILURE: ICD-10-CM

## 2024-07-02 DIAGNOSIS — I10 ESSENTIAL (PRIMARY) HYPERTENSION: ICD-10-CM

## 2024-07-02 DIAGNOSIS — R06.1 STRIDOR: ICD-10-CM

## 2024-07-02 DIAGNOSIS — J98.01 ACUTE BRONCHOSPASM: ICD-10-CM

## 2024-07-02 LAB
ANION GAP SERPL CALC-SCNC: 16 MMOL/L — SIGNIFICANT CHANGE UP (ref 5–17)
BASE EXCESS BLDA CALC-SCNC: -5.2 MMOL/L — LOW (ref -2–3)
BLOOD GAS COMMENTS ARTERIAL: SIGNIFICANT CHANGE UP
BUN SERPL-MCNC: 17.7 MG/DL — SIGNIFICANT CHANGE UP (ref 8–20)
CALCIUM SERPL-MCNC: 8.9 MG/DL — SIGNIFICANT CHANGE UP (ref 8.4–10.5)
CHLORIDE SERPL-SCNC: 107 MMOL/L — SIGNIFICANT CHANGE UP (ref 96–108)
CK MB CFR SERPL CALC: 23.9 NG/ML — HIGH (ref 0–6.7)
CK SERPL-CCNC: 614 U/L — HIGH (ref 25–170)
CO2 SERPL-SCNC: 19 MMOL/L — LOW (ref 22–29)
CREAT SERPL-MCNC: 0.85 MG/DL — SIGNIFICANT CHANGE UP (ref 0.5–1.3)
EGFR: 70 ML/MIN/1.73M2 — SIGNIFICANT CHANGE UP
FERRITIN SERPL-MCNC: 265 NG/ML — SIGNIFICANT CHANGE UP (ref 13–330)
FOLATE SERPL-MCNC: 14.7 NG/ML — SIGNIFICANT CHANGE UP
GAS PNL BLDA: SIGNIFICANT CHANGE UP
GLUCOSE BLDC GLUCOMTR-MCNC: 118 MG/DL — HIGH (ref 70–99)
GLUCOSE BLDC GLUCOMTR-MCNC: 131 MG/DL — HIGH (ref 70–99)
GLUCOSE BLDC GLUCOMTR-MCNC: 137 MG/DL — HIGH (ref 70–99)
GLUCOSE SERPL-MCNC: 120 MG/DL — HIGH (ref 70–99)
HCO3 BLDA-SCNC: 19 MMOL/L — LOW (ref 21–28)
HCT VFR BLD CALC: 34.3 % — LOW (ref 34.5–45)
HGB BLD-MCNC: 11.7 G/DL — SIGNIFICANT CHANGE UP (ref 11.5–15.5)
HOROWITZ INDEX BLDA+IHG-RTO: 21 — SIGNIFICANT CHANGE UP
IRON SATN MFR SERPL: 13 % — LOW (ref 14–50)
IRON SATN MFR SERPL: 40 UG/DL — SIGNIFICANT CHANGE UP (ref 37–145)
MAGNESIUM SERPL-MCNC: 1.9 MG/DL — SIGNIFICANT CHANGE UP (ref 1.6–2.6)
MCHC RBC-ENTMCNC: 29.6 PG — SIGNIFICANT CHANGE UP (ref 27–34)
MCHC RBC-ENTMCNC: 34.1 GM/DL — SIGNIFICANT CHANGE UP (ref 32–36)
MCV RBC AUTO: 86.8 FL — SIGNIFICANT CHANGE UP (ref 80–100)
NT-PROBNP SERPL-SCNC: 7067 PG/ML — HIGH (ref 0–300)
PCO2 BLDA: 28 MMHG — LOW (ref 32–45)
PH BLDA: 7.44 — SIGNIFICANT CHANGE UP (ref 7.35–7.45)
PHOSPHATE SERPL-MCNC: 3.9 MG/DL — SIGNIFICANT CHANGE UP (ref 2.4–4.7)
PLATELET # BLD AUTO: 265 K/UL — SIGNIFICANT CHANGE UP (ref 150–400)
PO2 BLDA: 75 MMHG — LOW (ref 83–108)
POTASSIUM SERPL-MCNC: 3.6 MMOL/L — SIGNIFICANT CHANGE UP (ref 3.5–5.3)
POTASSIUM SERPL-SCNC: 3.6 MMOL/L — SIGNIFICANT CHANGE UP (ref 3.5–5.3)
PROCALCITONIN SERPL-MCNC: 0.09 NG/ML — SIGNIFICANT CHANGE UP (ref 0.02–0.1)
RAPID RVP RESULT: SIGNIFICANT CHANGE UP
RBC # BLD: 3.95 M/UL — SIGNIFICANT CHANGE UP (ref 3.8–5.2)
RBC # FLD: 14.8 % — HIGH (ref 10.3–14.5)
SAO2 % BLDA: 96.7 % — SIGNIFICANT CHANGE UP (ref 94–98)
SARS-COV-2 RNA SPEC QL NAA+PROBE: SIGNIFICANT CHANGE UP
SODIUM SERPL-SCNC: 141 MMOL/L — SIGNIFICANT CHANGE UP (ref 135–145)
TIBC SERPL-MCNC: 317 UG/DL — SIGNIFICANT CHANGE UP (ref 220–430)
TRANSFERRIN SERPL-MCNC: 222 MG/DL — SIGNIFICANT CHANGE UP (ref 192–382)
TROPONIN T, HIGH SENSITIVITY RESULT: 46 NG/L — SIGNIFICANT CHANGE UP (ref 0–51)
TROPONIN T, HIGH SENSITIVITY RESULT: 55 NG/L — HIGH (ref 0–51)
VIT B12 SERPL-MCNC: >2000 PG/ML — HIGH (ref 232–1245)
WBC # BLD: 20.43 K/UL — HIGH (ref 3.8–10.5)
WBC # FLD AUTO: 20.43 K/UL — HIGH (ref 3.8–10.5)

## 2024-07-02 PROCEDURE — 99291 CRITICAL CARE FIRST HOUR: CPT

## 2024-07-02 PROCEDURE — 99233 SBSQ HOSP IP/OBS HIGH 50: CPT

## 2024-07-02 PROCEDURE — 93010 ELECTROCARDIOGRAM REPORT: CPT

## 2024-07-02 PROCEDURE — 31575 DIAGNOSTIC LARYNGOSCOPY: CPT

## 2024-07-02 PROCEDURE — 99234 HOSP IP/OBS SM DT SF/LOW 45: CPT

## 2024-07-02 PROCEDURE — 99223 1ST HOSP IP/OBS HIGH 75: CPT | Mod: GC

## 2024-07-02 PROCEDURE — 99222 1ST HOSP IP/OBS MODERATE 55: CPT | Mod: 25

## 2024-07-02 PROCEDURE — 71045 X-RAY EXAM CHEST 1 VIEW: CPT | Mod: 26

## 2024-07-02 PROCEDURE — 99223 1ST HOSP IP/OBS HIGH 75: CPT

## 2024-07-02 RX ORDER — DEXTROSE 30 % IN WATER 30 %
12.5 VIAL (ML) INTRAVENOUS ONCE
Refills: 0 | Status: DISCONTINUED | OUTPATIENT
Start: 2024-07-02 | End: 2024-07-11

## 2024-07-02 RX ORDER — FUROSEMIDE 10 MG/ML
20 INJECTION, SOLUTION INTRAMUSCULAR; INTRAVENOUS ONCE
Refills: 0 | Status: COMPLETED | OUTPATIENT
Start: 2024-07-02 | End: 2024-07-02

## 2024-07-02 RX ORDER — DEXTROSE 30 % IN WATER 30 %
15 VIAL (ML) INTRAVENOUS ONCE
Refills: 0 | Status: DISCONTINUED | OUTPATIENT
Start: 2024-07-02 | End: 2024-07-11

## 2024-07-02 RX ORDER — LOSARTAN POTASSIUM 100 MG/1
50 TABLET, FILM COATED ORAL ONCE
Refills: 0 | Status: COMPLETED | OUTPATIENT
Start: 2024-07-02 | End: 2024-07-02

## 2024-07-02 RX ORDER — DEXTROSE MONOHYDRATE AND SODIUM CHLORIDE 5; .3 G/100ML; G/100ML
1000 INJECTION, SOLUTION INTRAVENOUS
Refills: 0 | Status: DISCONTINUED | OUTPATIENT
Start: 2024-07-02 | End: 2024-07-11

## 2024-07-02 RX ORDER — FUROSEMIDE 10 MG/ML
40 INJECTION, SOLUTION INTRAMUSCULAR; INTRAVENOUS ONCE
Refills: 0 | Status: COMPLETED | OUTPATIENT
Start: 2024-07-02 | End: 2024-07-02

## 2024-07-02 RX ORDER — LOSARTAN POTASSIUM 100 MG/1
100 TABLET, FILM COATED ORAL DAILY
Refills: 0 | Status: DISCONTINUED | OUTPATIENT
Start: 2024-07-03 | End: 2024-07-06

## 2024-07-02 RX ORDER — DEXTROSE MONOHYDRATE 100 MG/ML
125 INJECTION, SOLUTION INTRAVENOUS ONCE
Refills: 0 | Status: DISCONTINUED | OUTPATIENT
Start: 2024-07-02 | End: 2024-07-11

## 2024-07-02 RX ORDER — DEXTROSE 30 % IN WATER 30 %
25 VIAL (ML) INTRAVENOUS ONCE
Refills: 0 | Status: DISCONTINUED | OUTPATIENT
Start: 2024-07-02 | End: 2024-07-11

## 2024-07-02 RX ORDER — GLUCAGON HYDROCHLORIDE 1 MG/ML
1 INJECTION, POWDER, FOR SOLUTION INTRAMUSCULAR; INTRAVENOUS; SUBCUTANEOUS ONCE
Refills: 0 | Status: DISCONTINUED | OUTPATIENT
Start: 2024-07-02 | End: 2024-07-11

## 2024-07-02 RX ORDER — CEFTRIAXONE SODIUM 500 MG
1000 VIAL (EA) INJECTION EVERY 24 HOURS
Refills: 0 | Status: DISCONTINUED | OUTPATIENT
Start: 2024-07-02 | End: 2024-07-02

## 2024-07-02 RX ORDER — BUDESONIDE 0.25 MG/2ML
0.5 INHALANT ORAL EVERY 12 HOURS
Refills: 0 | Status: DISCONTINUED | OUTPATIENT
Start: 2024-07-02 | End: 2024-07-10

## 2024-07-02 RX ORDER — INSULIN LISPRO 100 [IU]/ML
INJECTION, SOLUTION SUBCUTANEOUS AT BEDTIME
Refills: 0 | Status: DISCONTINUED | OUTPATIENT
Start: 2024-07-02 | End: 2024-07-11

## 2024-07-02 RX ORDER — METHYLPREDNISOLONE ACETATE 20 MG/ML
40 VIAL (ML) INJECTION ONCE
Refills: 0 | Status: COMPLETED | OUTPATIENT
Start: 2024-07-02 | End: 2024-07-02

## 2024-07-02 RX ORDER — IPRATROPIUM BROMIDE AND ALBUTEROL SULFATE .5; 3 MG/3ML; MG/3ML
3 SOLUTION RESPIRATORY (INHALATION) EVERY 4 HOURS
Refills: 0 | Status: DISCONTINUED | OUTPATIENT
Start: 2024-07-02 | End: 2024-07-03

## 2024-07-02 RX ORDER — METHYLPREDNISOLONE ACETATE 20 MG/ML
40 VIAL (ML) INJECTION EVERY 6 HOURS
Refills: 0 | Status: DISCONTINUED | OUTPATIENT
Start: 2024-07-02 | End: 2024-07-03

## 2024-07-02 RX ORDER — MAGNESIUM SULFATE 100 %
2 POWDER (GRAM) MISCELLANEOUS ONCE
Refills: 0 | Status: COMPLETED | OUTPATIENT
Start: 2024-07-02 | End: 2024-07-02

## 2024-07-02 RX ORDER — INSULIN LISPRO 100 [IU]/ML
INJECTION, SOLUTION SUBCUTANEOUS
Refills: 0 | Status: DISCONTINUED | OUTPATIENT
Start: 2024-07-02 | End: 2024-07-11

## 2024-07-02 RX ORDER — FUROSEMIDE 10 MG/ML
40 INJECTION, SOLUTION INTRAMUSCULAR; INTRAVENOUS DAILY
Refills: 0 | Status: DISCONTINUED | OUTPATIENT
Start: 2024-07-02 | End: 2024-07-05

## 2024-07-02 RX ADMIN — IPRATROPIUM BROMIDE AND ALBUTEROL SULFATE 3 MILLILITER(S): .5; 3 SOLUTION RESPIRATORY (INHALATION) at 14:27

## 2024-07-02 RX ADMIN — Medication 25 MILLIGRAM(S): at 17:30

## 2024-07-02 RX ADMIN — LABETALOL HYDROCHLORIDE 10 MILLIGRAM(S): 300 TABLET ORAL at 02:35

## 2024-07-02 RX ADMIN — MONTELUKAST SODIUM 10 MILLIGRAM(S): 10 TABLET, FILM COATED ORAL at 08:44

## 2024-07-02 RX ADMIN — HYDRALAZINE HYDROCHLORIDE 10 MILLIGRAM(S): 50 TABLET ORAL at 18:50

## 2024-07-02 RX ADMIN — HYDRALAZINE HYDROCHLORIDE 10 MILLIGRAM(S): 50 TABLET ORAL at 21:56

## 2024-07-02 RX ADMIN — Medication 25 MILLIGRAM(S): at 05:21

## 2024-07-02 RX ADMIN — Medication 150 GRAM(S): at 11:37

## 2024-07-02 RX ADMIN — IPRATROPIUM BROMIDE AND ALBUTEROL SULFATE 3 MILLILITER(S): .5; 3 SOLUTION RESPIRATORY (INHALATION) at 23:28

## 2024-07-02 RX ADMIN — ENOXAPARIN SODIUM 40 MILLIGRAM(S): 100 INJECTION SUBCUTANEOUS at 21:55

## 2024-07-02 RX ADMIN — LEVETIRACETAM 1000 MILLIGRAM(S): 100 INJECTION INTRAVENOUS at 05:24

## 2024-07-02 RX ADMIN — LOSARTAN POTASSIUM 50 MILLIGRAM(S): 100 TABLET, FILM COATED ORAL at 05:21

## 2024-07-02 RX ADMIN — PREDNISONE 40 MILLIGRAM(S): 10 TABLET ORAL at 05:21

## 2024-07-02 RX ADMIN — BUDESONIDE 0.5 MILLIGRAM(S): 0.25 INHALANT ORAL at 20:36

## 2024-07-02 RX ADMIN — POLYETHYLENE GLYCOL 3350 17 GRAM(S): 1 POWDER ORAL at 08:44

## 2024-07-02 RX ADMIN — LIDOCAINE HCL 1 PATCH: 28 GEL TOPICAL at 21:55

## 2024-07-02 RX ADMIN — LOSARTAN POTASSIUM 50 MILLIGRAM(S): 100 TABLET, FILM COATED ORAL at 11:34

## 2024-07-02 RX ADMIN — IPRATROPIUM BROMIDE AND ALBUTEROL SULFATE 3 MILLILITER(S): .5; 3 SOLUTION RESPIRATORY (INHALATION) at 20:36

## 2024-07-02 RX ADMIN — FUROSEMIDE 40 MILLIGRAM(S): 10 INJECTION, SOLUTION INTRAMUSCULAR; INTRAVENOUS at 12:03

## 2024-07-02 RX ADMIN — LABETALOL HYDROCHLORIDE 10 MILLIGRAM(S): 300 TABLET ORAL at 06:56

## 2024-07-02 RX ADMIN — PANTOPRAZOLE SODIUM 40 MILLIGRAM(S): 40 INJECTION, POWDER, FOR SOLUTION INTRAVENOUS at 05:21

## 2024-07-02 RX ADMIN — FUROSEMIDE 20 MILLIGRAM(S): 10 INJECTION, SOLUTION INTRAMUSCULAR; INTRAVENOUS at 00:48

## 2024-07-02 RX ADMIN — LABETALOL HYDROCHLORIDE 10 MILLIGRAM(S): 300 TABLET ORAL at 16:46

## 2024-07-02 RX ADMIN — SERTRALINE HYDROCHLORIDE 100 MILLIGRAM(S): 100 TABLET, FILM COATED ORAL at 08:44

## 2024-07-02 RX ADMIN — LEVETIRACETAM 1000 MILLIGRAM(S): 100 INJECTION INTRAVENOUS at 17:30

## 2024-07-02 RX ADMIN — FUROSEMIDE 20 MILLIGRAM(S): 10 INJECTION, SOLUTION INTRAMUSCULAR; INTRAVENOUS at 00:09

## 2024-07-02 RX ADMIN — IPRATROPIUM BROMIDE AND ALBUTEROL SULFATE 3 MILLILITER(S): .5; 3 SOLUTION RESPIRATORY (INHALATION) at 14:26

## 2024-07-02 RX ADMIN — FUROSEMIDE 40 MILLIGRAM(S): 10 INJECTION, SOLUTION INTRAMUSCULAR; INTRAVENOUS at 17:29

## 2024-07-02 RX ADMIN — Medication 40 MILLIGRAM(S): at 03:23

## 2024-07-02 RX ADMIN — Medication 40 MILLIGRAM(S): at 17:29

## 2024-07-02 RX ADMIN — LEVALBUTEROL HYDROCHLORIDE 0.63 MILLIGRAM(S): 1.25 SOLUTION RESPIRATORY (INHALATION) at 01:01

## 2024-07-02 RX ADMIN — Medication 40 MILLIGRAM(S): at 11:34

## 2024-07-02 NOTE — PROGRESS NOTE ADULT - SUBJECTIVE AND OBJECTIVE BOX
[place]ler grid at home. MVI/AREDS discussed. Patient to call if any changes in vision or grid card. Preliminary note, official recommendations pending attending review/signature   [facility] Stroke Team  Progress Note     HPI:  78 y/o Female with PMHx significant for HTN, HLD and CAD s/p stent who takes ASA presented to [facility] the morning of 6/29/24 with AMS and urinary incontinence and her last known well approximately 530am. CTH at [place] revealed R parietal lobe hematoma. Patient was transferred to [facility] for further management. Of note, patient recieved 3mg ativan, haldol and 1g keppra prior to arrival at [facility] ED for both agitation and potential seizures. Once in ED, ED providers concerned for patient's airway as she was gargling with respirations. Decision made by ED staff to intubate patient for airway protection. ROS unable to be obtained as patient was in distress altered on exam.   GCS: 12 (E3, V4, M5) prior to intubation, ICH: 1, mRS: 0    SUBJECTIVE: Patient seen and examined at bedside on 4TWR. Overnight patient with worsening wheezing, SOB, respiratory distress despite nebs/steroids/interventions. Patient/family declining CT Angio overnight to r/o PE. Patient remained hemodynamically stable, with labored respirations. Pulm consulted, recs appreciated, patient placed on continuous BiPAP, made NPO, recommended for ENT scope to further assess. BNP largely increased to >7000. MICU consulted, patient recommended for SDU. No new neurologic complaints. ROS reported negative unless otherwise noted.    MEDICATIONS:  acetaminophen     Tablet .. 650 milliGRAM(s) Oral every 6 hours PRN  albuterol/ipratropium for Nebulization 3 milliLiter(s) Nebulizer every 4 hours  atorvastatin 40 milliGRAM(s) Oral at bedtime  buDESOnide    Inhalation Suspension 0.5 milliGRAM(s) Inhalation every 12 hours  dextrose 10% Bolus 125 milliLiter(s) IV Bolus once  dextrose 5%. 1000 milliLiter(s) IV Continuous <Continuous>  dextrose 5%. 1000 milliLiter(s) IV Continuous <Continuous>  dextrose 50% Injectable 12.5 Gram(s) IV Push once  dextrose 50% Injectable 25 Gram(s) IV Push once  dextrose Oral Gel 15 Gram(s) Oral once PRN  enoxaparin Injectable 40 milliGRAM(s) SubCutaneous every 24 hours  glucagon  Injectable 1 milliGRAM(s) IntraMuscular once  hydrALAZINE Injectable 10 milliGRAM(s) IV Push every 2 hours PRN  insulin lispro (ADMELOG) corrective regimen sliding scale   SubCutaneous three times a day before meals  insulin lispro (ADMELOG) corrective regimen sliding scale   SubCutaneous at bedtime  labetalol Injectable 10 milliGRAM(s) IV Push every 2 hours PRN  levalbuterol Inhalation 0.63 milliGRAM(s) Inhalation every 6 hours PRN  levETIRAcetam 1000 milliGRAM(s) Oral two times a day  lidocaine   4% Patch 1 Patch Transdermal every 24 hours  methylPREDNISolone sodium succinate Injectable 40 milliGRAM(s) IV Push every 6 hours  metoprolol tartrate 25 milliGRAM(s) Oral two times a day  montelukast 10 milliGRAM(s) Oral daily  pantoprazole    Tablet 40 milliGRAM(s) Oral before breakfast  polyethylene glycol 3350 17 Gram(s) Oral daily  senna Syrup 10 milliLiter(s) Oral at bedtime  sertraline 100 milliGRAM(s) Oral daily      Vital Signs Last 24 Hrs  T(C): 36.9 (02 Jul 2024 13:00), Max: 37.6 (01 Jul 2024 21:45)  T(F): 98.4 (02 Jul 2024 13:00), Max: 99.6 (01 Jul 2024 21:45)  HR: 96 (02 Jul 2024 13:00) (89 - 113)  BP: 165/74 (02 Jul 2024 13:00) (131/59 - 180/79)  BP(mean): 108 (01 Jul 2024 17:03) (81 - 108)  RR: 20 (02 Jul 2024 13:00) (19 - 24)  SpO2: 100% (02 Jul 2024 13:00) (95% - 100%)    Parameters below as of 02 Jul 2024 13:00  Patient On (Oxygen Delivery Method): nasal cannula  O2 Flow (L/min): 4      PHYSICAL EXAM:  General: No acute distress, tolerating BiPAP. Mild bruising to LUE.    NEUROLOGICAL EXAM:  Mental status: Awake and alert, normal attention span. Oriented x 4 to self, place, time, situation. Speech fluent, follows commands, no neglect, normal memory   Cranial Nerves: No facial asymmetry, no nystagmus, no dysarthria,  tongue midline  Motor exam: Normal tone, no drift,  4/5 RUE, 5/5 RLE,   4/5 LUE (likely pain limited), 5/5 LLE  Impaired fine finger movements on Left.  Sensation: Intact to light touch   Coordination/ Gait: No dysmetria, gait not tested      LABS:                        11.7   20.43 )-----------( 265      ( 02 Jul 2024 05:30 )             34.3    07-02    141  |  107  |  17.7  ----------------------------<  120<H>  3.6   |  19.0<L>  |  0.85    Ca    8.9      02 Jul 2024 05:30  Phos  3.9     07-02  Mg     1.9     07-02      LDL 47  A1C 5.8    IMAGING:     CT Head No Cont (06.30.24 @ 09:41)  IMPRESSION: No change since 6/29/2024. Right small right parietal   cortical parenchymal hemorrhage.    CT Angio Head w/ IV Cont (06.29.24 @ 13:48)  Impression: Age-appropriate involutional and ischemic gliotic changes. No   change in right parietal parenchymal hemorrhage since 7:42 AM. Normal CTA   of the head and neck.    CT Head No Cont (06.29.24 @ 07:48)  IMPRESSION:  1. 1.7 cm acute juxtacortical hematoma with surrounding vasogenic edema,   right parietal lobe. Recommend follow-up imaging, as underlying lesion   cannot be excluded.  2. Moderate-severe white matter hypodensities are nonspecific however   statistically reflects chronic microvascular ischemic change.  3. Additional findings described in detail above.    TTE W or WO Ultrasound Enhancing Agent (06.30.24 @ 08:50)  CONCLUSIONS:   1. Left ventricular cavity is normal in size. Left ventricular systolic function is normal with an ejection fraction visually estimated at 60 to 65 %.   2. There is mild (grade 1) left ventricular diastolic dysfunction.   3. Normal right ventricular cavity size and normal right ventricular systolic function.   4. The left atrium is normal in size.   5. The right atrium is normal in size.   6. Structurally normal mitral valve with normal leaflet excursion.   7. No pericardial effusion seen.   8. Mild tricuspid regurgitation.   9. Estimated pulmonary artery systolic pressure is 38 mmHg.  10. Mild left ventricular hypertrophy.  11. Technically difficult image quality.  12. Trileaflet aortic valve with normal systolic excursion. Fibrocalcific aortic valve sclerosis without stenosis.     [facility] Stroke Team  Progress Note     HPI:  80 y/o Female with PMHx significant for HTN, HLD and CAD s/p stent who takes ASA presented to [facility] the morning of 6/29/24 with AMS and urinary incontinence and her last known well approximately 530am. CTH at [place] revealed R parietal lobe hematoma. Patient was transferred to [facility] for further management. Of note, patient recieved 3mg ativan, haldol and 1g keppra prior to arrival at [facility] ED for both agitation and potential seizures. Once in ED, ED providers concerned for patient's airway as she was gargling with respirations. Decision made by ED staff to intubate patient for airway protection. ROS unable to be obtained as patient was in distress altered on exam.   GCS: 12 (E3, V4, M5) prior to intubation, ICH: 1, mRS: 0    SUBJECTIVE: Patient seen and examined at bedside on 4TWR. Overnight patient with worsening wheezing, SOB, respiratory distress despite nebs/steroids/interventions. Patient/family declining CT Angio overnight to r/o PE. Patient remained hemodynamically stable, with labored respirations. Pulm consulted, recs appreciated, patient placed on continuous BiPAP, made NPO, recommended for ENT scope to further assess. BNP largely increased to >7000. MICU consulted, patient recommended for SDU. No new neurologic complaints. ROS reported negative unless otherwise noted.    MEDICATIONS:  acetaminophen     Tablet .. 650 milliGRAM(s) Oral every 6 hours PRN  albuterol/ipratropium for Nebulization 3 milliLiter(s) Nebulizer every 4 hours  atorvastatin 40 milliGRAM(s) Oral at bedtime  buDESOnide    Inhalation Suspension 0.5 milliGRAM(s) Inhalation every 12 hours  dextrose 10% Bolus 125 milliLiter(s) IV Bolus once  dextrose 5%. 1000 milliLiter(s) IV Continuous <Continuous>  dextrose 5%. 1000 milliLiter(s) IV Continuous <Continuous>  dextrose 50% Injectable 12.5 Gram(s) IV Push once  dextrose 50% Injectable 25 Gram(s) IV Push once  dextrose Oral Gel 15 Gram(s) Oral once PRN  enoxaparin Injectable 40 milliGRAM(s) SubCutaneous every 24 hours  glucagon  Injectable 1 milliGRAM(s) IntraMuscular once  hydrALAZINE Injectable 10 milliGRAM(s) IV Push every 2 hours PRN  insulin lispro (ADMELOG) corrective regimen sliding scale   SubCutaneous three times a day before meals  insulin lispro (ADMELOG) corrective regimen sliding scale   SubCutaneous at bedtime  labetalol Injectable 10 milliGRAM(s) IV Push every 2 hours PRN  levalbuterol Inhalation 0.63 milliGRAM(s) Inhalation every 6 hours PRN  levETIRAcetam 1000 milliGRAM(s) Oral two times a day  lidocaine   4% Patch 1 Patch Transdermal every 24 hours  methylPREDNISolone sodium succinate Injectable 40 milliGRAM(s) IV Push every 6 hours  metoprolol tartrate 25 milliGRAM(s) Oral two times a day  montelukast 10 milliGRAM(s) Oral daily  pantoprazole    Tablet 40 milliGRAM(s) Oral before breakfast  polyethylene glycol 3350 17 Gram(s) Oral daily  senna Syrup 10 milliLiter(s) Oral at bedtime  sertraline 100 milliGRAM(s) Oral daily      Vital Signs Last 24 Hrs  T(C): 36.9 (02 Jul 2024 13:00), Max: 37.6 (01 Jul 2024 21:45)  T(F): 98.4 (02 Jul 2024 13:00), Max: 99.6 (01 Jul 2024 21:45)  HR: 96 (02 Jul 2024 13:00) (89 - 113)  BP: 165/74 (02 Jul 2024 13:00) (131/59 - 180/79)  BP(mean): 108 (01 Jul 2024 17:03) (81 - 108)  RR: 20 (02 Jul 2024 13:00) (19 - 24)  SpO2: 100% (02 Jul 2024 13:00) (95% - 100%)    Parameters below as of 02 Jul 2024 13:00  Patient On (Oxygen Delivery Method): nasal cannula  O2 Flow (L/min): 4      PHYSICAL EXAM:  General: No acute distress, tolerating BiPAP. Mild bruising to LUE.    NEUROLOGICAL EXAM:  Mental status: Awake and alert, normal attention span. Oriented x 4 to self, place, time, situation. Speech fluent, follows commands, no neglect, normal memory   Cranial Nerves: No facial asymmetry, no nystagmus, no dysarthria,  tongue midline  Motor exam: Normal tone, no drift,  4/5 RUE, 5/5 RLE,   4/5 LUE (likely pain limited), 5/5 LLE  Impaired fine finger movements on Left.  Sensation: Intact to light touch   Coordination/ Gait: No dysmetria, gait not tested      LABS:                        11.7   20.43 )-----------( 265      ( 02 Jul 2024 05:30 )             34.3    07-02    141  |  107  |  17.7  ----------------------------<  120<H>  3.6   |  19.0<L>  |  0.85    Ca    8.9      02 Jul 2024 05:30  Phos  3.9     07-02  Mg     1.9     07-02      LDL 47  A1C 5.8    IMAGING:     CT Head No Cont (06.30.24 @ 09:41)  IMPRESSION: No change since 6/29/2024. Right small right parietal   cortical parenchymal hemorrhage.    CT Angio Head w/ IV Cont (06.29.24 @ 13:48)  Impression: Age-appropriate involutional and ischemic gliotic changes. No   change in right parietal parenchymal hemorrhage since 7:42 AM. Normal CTA   of the head and neck.    CT Head No Cont (06.29.24 @ 07:48)  IMPRESSION:  1. 1.7 cm acute juxtacortical hematoma with surrounding vasogenic edema,   right parietal lobe. Recommend follow-up imaging, as underlying lesion   cannot be excluded.  2. Moderate-severe white matter hypodensities are nonspecific however   statistically reflects chronic microvascular ischemic change.  3. Additional findings described in detail above.    TTE W or WO Ultrasound Enhancing Agent (06.30.24 @ 08:50)  CONCLUSIONS:   1. Left ventricular cavity is normal in size. Left ventricular systolic function is normal with an ejection fraction visually estimated at 60 to 65 %.   2. There is mild (grade 1) left ventricular diastolic dysfunction.   3. Normal right ventricular cavity size and normal right ventricular systolic function.   4. The left atrium is normal in size.   5. The right atrium is normal in size.   6. Structurally normal mitral valve with normal leaflet excursion.   7. No pericardial effusion seen.   8. Mild tricuspid regurgitation.   9. Estimated pulmonary artery systolic pressure is 38 mmHg.  10. Mild left ventricular hypertrophy.  11. Technically difficult image quality.  12. Trileaflet aortic valve with normal systolic excursion. Fibrocalcific aortic valve sclerosis without stenosis.

## 2024-07-02 NOTE — CONSULT NOTE ADULT - ASSESSMENT
80 yo F admitted w intracranial hemorrhage and tx ofseizures, intubated x1 day with subsequent expiratory wheezes w improvement on BiPAP.    # Acute hypoxic respiratory failure  # Expiratory wheezing  - No stridor audible but having diffuse expiratory wheezing, best audible in the upper airway  - Extubated 6/30  - BNP ~7000  - On bedside POCUS EF appears to be normal, patient does not appear overloaded clinically  - Currently on BIPAP, improiving  - Repeat CPK, trop, CXR  - Heliox and Racemic epinephrine  - ENT eval noted  - Indirect laryngoscopy showing possible subglottic stenosis  - The patient does not need ICU level of care at the moment. Please reconsult if clinical situation warrants.

## 2024-07-02 NOTE — CONSULT NOTE ADULT - SUBJECTIVE AND OBJECTIVE BOX
PULMONARY CONSULT NOTE      BIANCA IZQUIERDOMAGY-[id number]    Patient is a 79y old  Female who presents with a chief complaint of IPH ?seizure (01 Jul 2024 15:20)  The patient is a 79y Female who presented to [place] with altered mental status, and urinary incontinence. CT noted to have intracranial hemorrhage. She was transferred to [facility] neuro-ICU for c-EEG and management.  Neurology called today to assess in anticipation for downgrading.     PAST MEDICAL & SURGICAL HISTORY:  Hypertension     HISTORY OF PRESENT ILLNESS:  never smoker  hx wheezing and SOB, given singular by PMD  no definite hx asthma  chronic SOB per family   no Cp, more SOB, no sputum  no swallowing issues per family  MEDICATIONS  (STANDING):  atorvastatin 40 milliGRAM(s) Oral at bedtime  buDESOnide    Inhalation Suspension 0.5 milliGRAM(s) Inhalation every 12 hours  enoxaparin Injectable 40 milliGRAM(s) SubCutaneous every 24 hours  levETIRAcetam 1000 milliGRAM(s) Oral two times a day  lidocaine   4% Patch 1 Patch Transdermal every 24 hours  losartan 50 milliGRAM(s) Oral once  metoprolol tartrate 25 milliGRAM(s) Oral two times a day  montelukast 10 milliGRAM(s) Oral daily  pantoprazole    Tablet 40 milliGRAM(s) Oral before breakfast  polyethylene glycol 3350 17 Gram(s) Oral daily  predniSONE   Tablet 40 milliGRAM(s) Oral daily  senna Syrup 10 milliLiter(s) Oral at bedtime  sertraline 100 milliGRAM(s) Oral daily      MEDICATIONS  (PRN):  acetaminophen     Tablet .. 650 milliGRAM(s) Oral every 6 hours PRN Temp greater or equal to 38C (100.4F), Mild Pain (1 - 3)  albuterol/ipratropium for Nebulization 3 milliLiter(s) Nebulizer every 6 hours PRN Shortness of Breath and/or Wheezing  benzocaine/menthol Lozenge 1 Lozenge Oral every 4 hours PRN Sore Throat  hydrALAZINE Injectable 10 milliGRAM(s) IV Push every 2 hours PRN Sbp >160  labetalol Injectable 10 milliGRAM(s) IV Push every 2 hours PRN SBP>160  levalbuterol Inhalation 0.63 milliGRAM(s) Inhalation every 6 hours PRN SOB/wheezing      Allergies    penicillins (Unknown)    Intolerances        PAST MEDICAL & SURGICAL HISTORY:      FAMILY HISTORY:      SOCIAL HISTORY  Smoking History:     REVIEW OF SYSTEMS:    CONSTITUTIONAL:  No fevers, chills, sweats    HEENT:  Eyes:  No diplopia or blurred vision. ENT:  No earache, sore throat or runny nose.    CARDIOVASCULAR:  No pressure, squeezing, tightness, or heaviness about the chest; no palpitations.    RESPIRATORY:  see HPI    GASTROINTESTINAL:  No abdominal pain, nausea, vomiting or diarrhea.    GENITOURINARY:  No dysuria, frequency or urgency.    NEUROLOGIC:  No paresthesias, fasciculations, seizures or weakness.    PSYCHIATRIC:  No disorder of thought or mood.    Vital Signs Last 24 Hrs  T(C): 36.9 (02 Jul 2024 08:20), Max: 37.6 (01 Jul 2024 21:45)  T(F): 98.4 (02 Jul 2024 08:20), Max: 99.6 (01 Jul 2024 21:45)  HR: 89 (02 Jul 2024 08:20) (74 - 113)  BP: 180/79 (02 Jul 2024 08:20) (125/55 - 180/79)  BP(mean): 108 (01 Jul 2024 17:03) (74 - 108)  RR: 20 (02 Jul 2024 08:20) (19 - 24)  SpO2: 100% (02 Jul 2024 08:20) (95% - 100%)    Parameters below as of 02 Jul 2024 08:20  Patient On (Oxygen Delivery Method): nasal cannula  O2 Flow (L/min): 4      PHYSICAL EXAMINATION:    GENERAL: The patient is a well-developed, well-nourished, dyspneic, has paradox , not diaphoretic.     HEENT: Head is normocephalic and atraumatic. Extraocular muscles are intact. Mucous membranes are moist.     NECK: Supple. diffuse exp wheeze, best heard over trachea    LUNGS: mod air entry bilat , exp wheeze , best heard over trachea, Respirations labored, mild paradox    HEART: Regular rate and rhythm without murmur.    ABDOMEN: Soft, nontender, and nondistended.  No hepatosplenomegaly is noted.    EXTREMITIES: Without any cyanosis, clubbing, rash, lesions or edema.    NEUROLOGIC: Grossly intact.      LABS:                        11.7   20.43 )-----------( 265      ( 02 Jul 2024 05:30 )             34.3     07-02    141  |  107  |  17.7  ----------------------------<  120<H>  3.6   |  19.0<L>  |  0.85    Ca    8.9      02 Jul 2024 05:30  Phos  3.9     07-02  Mg     1.9     07-02        Urinalysis Basic - ( 02 Jul 2024 05:30 )    Color: x / Appearance: x / SG: x / pH: x  Gluc: 120 mg/dL / Ketone: x  / Bili: x / Urobili: x   Blood: x / Protein: x / Nitrite: x   Leuk Esterase: x / RBC: x / WBC x   Sq Epi: x / Non Sq Epi: x / Bacteria: x      ABG - ( 02 Jul 2024 01:15 )  pH, Arterial: 7.440 pH, Blood: x     /  pCO2: 28    /  pO2: 75    / HCO3: 19    / Base Excess: -5.2  /  SaO2: 96.7                            MICROBIOLOGY:    RADIOLOGY & ADDITIONAL STUDIES:  CXR limited, mild increased markings

## 2024-07-02 NOTE — CONSULT NOTE ADULT - SUBJECTIVE AND OBJECTIVE BOX
CC: difficulty breathing    HPI: 79F with MHx significant for HTN, HLD and CAD s/p stent who takes ASA presented to [facility] this morning with AMS and urinary incontinence and her last known well approximately 530am. CTH at [place] revealed R parietal lobe hematoma. Patient was transferred to [facility] for further management. Of note, patient recieved 3mg ativan, haldol and 1g keppra prior to arrival at [facility] ED for both agitation and potential seizures. Once in ED, ED providers concerned for patient's airway as she was gargling with respirations. Decision made by ED staff to intubate patient for airway protection. Pt was extubated 22 hours later on 6/30. Pt since with intermittent difficulty breathing and audible wheezing noted to be worse overnight. Treated with Solumedrol 125 mg IVP followed by Solucortef 40 mg IVP Q 6h x 3 doses  Pt was Tx again with Prednisone 40 Mg po daily x 3 doses.  Pt takes Singulair 10 mg po daily at home for allergies/post nasal drip, per the daughter.  Previous CXR with slight scattered infiltrates, no evidence of infectious process on CXR but improving. Pt being Tx with Duonebs Q 6 Prn Sob/Wheezing and Xopenex Q 6 Hrs Prn SOB/Wheezing.  ENT consulted to further evaluate pts upper airway  Pt denies throat pain or hemoptysis. Breathing more comfortably on BiPAP      PAST MEDICAL & SURGICAL HISTORY:    Allergies    penicillins (Unknown)    Intolerances      MEDICATIONS  (STANDING):  albuterol/ipratropium for Nebulization 3 milliLiter(s) Nebulizer every 4 hours  atorvastatin 40 milliGRAM(s) Oral at bedtime  buDESOnide    Inhalation Suspension 0.5 milliGRAM(s) Inhalation every 12 hours  dextrose 10% Bolus 125 milliLiter(s) IV Bolus once  dextrose 5%. 1000 milliLiter(s) (50 mL/Hr) IV Continuous <Continuous>  dextrose 5%. 1000 milliLiter(s) (100 mL/Hr) IV Continuous <Continuous>  dextrose 50% Injectable 12.5 Gram(s) IV Push once  dextrose 50% Injectable 25 Gram(s) IV Push once  enoxaparin Injectable 40 milliGRAM(s) SubCutaneous every 24 hours  glucagon  Injectable 1 milliGRAM(s) IntraMuscular once  insulin lispro (ADMELOG) corrective regimen sliding scale   SubCutaneous at bedtime  insulin lispro (ADMELOG) corrective regimen sliding scale   SubCutaneous three times a day before meals  levETIRAcetam 1000 milliGRAM(s) Oral two times a day  lidocaine   4% Patch 1 Patch Transdermal every 24 hours  methylPREDNISolone sodium succinate Injectable 40 milliGRAM(s) IV Push every 6 hours  metoprolol tartrate 25 milliGRAM(s) Oral two times a day  montelukast 10 milliGRAM(s) Oral daily  pantoprazole    Tablet 40 milliGRAM(s) Oral before breakfast  polyethylene glycol 3350 17 Gram(s) Oral daily  senna Syrup 10 milliLiter(s) Oral at bedtime  sertraline 100 milliGRAM(s) Oral daily    MEDICATIONS  (PRN):  acetaminophen     Tablet .. 650 milliGRAM(s) Oral every 6 hours PRN Temp greater or equal to 38C (100.4F), Mild Pain (1 - 3)  benzocaine/menthol Lozenge 1 Lozenge Oral every 4 hours PRN Sore Throat  dextrose Oral Gel 15 Gram(s) Oral once PRN Blood Glucose LESS THAN 70 milliGRAM(s)/deciliter  hydrALAZINE Injectable 10 milliGRAM(s) IV Push every 2 hours PRN Sbp >160  labetalol Injectable 10 milliGRAM(s) IV Push every 2 hours PRN SBP>160  levalbuterol Inhalation 0.63 milliGRAM(s) Inhalation every 6 hours PRN SOB/wheezing      Social History: No reported toxic habits    Family history: No significant medical hx in first degree relatives       ROS:   ENT: all negative except as noted in HPI   Skin: No itching, dryness, rash, changes to hair, or skin masses  CV: denies palpitations  Pulm: denies SOB, cough, hemoptysis  GI: denies change in appetite, indigestion, n/v  : denies pertinent urinary symptoms, urgency  Neuro: denies numbness/tingling, loss of sensation  Psych: denies anxiety  MS: denies muscle weakness, instability  Heme: denies easy bruising or bleeding  Endo: denies heat/cold intolerance, excessive sweating  Vascular: denies LE edema    Vital Signs Last 24 Hrs  T(C): 36.9 (02 Jul 2024 08:20), Max: 37.6 (01 Jul 2024 21:45)  T(F): 98.4 (02 Jul 2024 08:20), Max: 99.6 (01 Jul 2024 21:45)  HR: 89 (02 Jul 2024 08:20) (84 - 113)  BP: 180/79 (02 Jul 2024 08:20) (131/59 - 180/79)  BP(mean): 108 (01 Jul 2024 17:03) (81 - 108)  RR: 20 (02 Jul 2024 08:20) (19 - 24)  SpO2: 100% (02 Jul 2024 11:09) (95% - 100%)    Parameters below as of 02 Jul 2024 08:20  Patient On (Oxygen Delivery Method): nasal cannula  O2 Flow (L/min): 4                            11.7   20.43 )-----------( 265      ( 02 Jul 2024 05:30 )             34.3    07-02    141  |  107  |  17.7  ----------------------------<  120<H>  3.6   |  19.0<L>  |  0.85    Ca    8.9      02 Jul 2024 05:30  Phos  3.9     07-02  Mg     1.9     07-02         PHYSICAL EXAM:  Gen: NAD  Skin: No rashes, bruises, or lesions  Head: Normocephalic, Atraumatic  Face: no edema, erythema, or fluctuance. Parotid glands soft without mass  Eyes: no scleral injection  Nose: Nares bilaterally patent, no discharge  Mouth: Drooling / Trismus.  Mucosa moist, tongue/uvula midline, oropharynx clear  Neck: Flat, supple, no lymphadenopathy, trachea midline, no masses  Lymphatic: No lymphadenopathy  Resp: +stridor  CV: no peripheral edema/cyanosis  GI: nondistended   Peripheral vascular: no JVD or edema  Neuro: facial nerve intact, no facial droop    Fiberoptic Indirect laryngoscopy:  (Scope #2 used)  Reason for Laryngoscopy: stridor  Subglottic stenosis vs. prominent cricoid shelf vs. poor visualization as pt gagging during scope  Nasopharynx, oropharynx, and hypopharynx clear, no bleeding. Tongue base, posterior pharyngeal wall, vallecula, epiglottis. No erythema, edema, pooling of secretions, masses or lesions. Airway patent, no foreign body visualized. No glottic/supraglottic edema. True vocal cords, arytenoids, vestibular folds, ventricles, pyriform sinuses, and aryepiglottic folds appear normal bilaterally. Vocal cords mobile with good contact b/l.

## 2024-07-02 NOTE — CONSULT NOTE ADULT - ASSESSMENT
78 yo F admitted w intracranial hemorrhage and tx ofseizures, intubated x1 day with subsequent stridor w improvement on BiPAP. 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

## 2024-07-02 NOTE — CHART NOTE - NSCHARTNOTEFT_GEN_A_CORE
PA NOTE-MEDICINE  (LATE ENTRY) PA NOTE-MEDICINE  (LATE ENTRY)    Called by RN due to Pt with worsening Wheezing/Rhonchi. PA NOTE-MEDICINE  (LATE ENTRY)    Called by RN due to Pt with worsening Wheezing/Rhonchi as per Day RN signing off to Night RN.  Family at Bedside also stated Pt's Wheezing/Rhonchi increased.   Pt was due to go to MRI Suite for MRI Brain-Respiratory Status Worsened Pt held from MRI until W/U for Worsening of Rhonchi/wheezing done and cleared.     80 y/o Female with PMHx significant for HTN, HLD and CAD s/p stent who takes ASA Transferred from [facility] the morning of 6/29/24 due to Right parietal lobe hematoma with surrounding vasogenic edema and new onset Seizures.  Pt started on keppra at [place]. Once in [facility] ED, patient was intubated for airway protection and admitted to Neuro ICU.  As per Neurosx- Etiology of hemorrhage possibly amyloid angiopathy, Pt Pending further imaging- MRI Brain Ordered and Pending. Pt currently on Seizure precautions Pt passed Dysphagia Screening   Patient extubated in NICU 6/30/24 with post Extubation Wheezing/Rhonchi.  Treated with Solumedrol 125 mg IVP followed by Solucortef 40 mg IVP Q 6 x 3 Doses  Pt again was Tx with Prednisone 40 Mg po Daily x 3 doses.  Pt takes Singulair 10 mg po Daily at Home, Asthma ? I asked Pt's Daughter who is at Bedside and shes states Pt's PMD Rx'd Singulair due to Allergies/Post nasal drip.     Pt Afebrile with Sl elevation WBC attributed to Steroid use.  Previous CXR: ? Slight Scattered Infiltrates No evidence of infectious process on CXR but improving. Pt being Tx with Duonebs Q 6 Prn Sob/Wheezing and Xopenex Q 6 Hrs Prn SOB/Wheezing   TTE: EF 60-65%   Pt currently receiving Lovenox 40 mg SQ Daily for DVT Prophylaxis with Vitals/Stroke neuro checks q 4 hours with BP Goal: 100-160   Neuro Exam Baseline     Pt currently Denies: CP Dizziness N/V SOB as Translated  by Daughter   Vitals:  BP: 162/99 (Prn Hydralazine given)   HR: 103   RR: 21  O2 Sat: 96 % Ra   Tr: 99.2     General: WDWD Female Sitting up in Bed + Sl Respiratory Distress/Belly Breathing No Accessory Muscles Used  + Audible Wheezing/Rhonchi   Cardiac: S1S2 + RRR Tachy  Lungs: + Rhonchi + Wheezing Throughout B/L A-B  Abd: NDNT No Guarding, Rigidity, Rebound + BS x 4 Q  Ext: No C/C/E x 4 Ext   Integument: No Pallor Warm/Dry     A/P Eval Pt due to Increase in Rhonchi/Wheezing as per outgoing RN and Family Members   ABG Stat  RVP Panel Stat-Neg  CXR Urgent: + Mild Pulm Congestion  Lasix 20 mg IVP x 1 Stat followed by Additional Dose Lasix 20 Mg IVP   Duoneb Tx Just finishes as per RN  Solumedrol 40 IVP x 1 Stat  Followed By Additional Solumedrol 40 mg IVP x 1   Additional Xopenex Tx Given      ABG - ( 02 Jul 2024 01:15 )  pH, Arterial: 7.440 pH, Blood: x     /  pCO2: 28    /  pO2: 75    / HCO3: 19    / Base Excess: -5.2  /  SaO2: 96.7    O2 NC 4 L/Min Ordered   Discussed case with Hospitalist-Recommends CTA Chest to r/o PE r/o PNA   Spoke to Pt's Daughter at Bedside about CTA Chest to  r/o PE, PNA   Daughter Stated she would like to Wait to AM to speak with her Family and Day Providers about CTA Chest   Reevaluated Pt-Decreased Rhonchi/Wheezing but still some residual  Pt resting, Appears more comfortable NAD No Belly Breathing.   HR Now decreased to 91     Repeat Vitals:  T(C): 36.9 (02 Jul 2024 05:03), Max: 37.6 (01 Jul 2024 21:45)  T(F): 98.4 (02 Jul 2024 05:03), Max: 99.6 (01 Jul 2024 21:45)  HR: 91 (02 Jul 2024 05:03) (68 - 113)  BP: 162/76 (02 Jul 2024 05:03) (96/60 - 179/99)  BP(mean): 108 (01 Jul 2024 17:03) (67 - 108)  RR: 21 (02 Jul 2024 05:03) (17 - 24)  SpO2: 96% (02 Jul 2024 05:03) (94% - 100%) 4 L nc     Continue to Monitor Pt  Recall PA for any changes in Pt status   Will sign out to AM Team to follow

## 2024-07-02 NOTE — CHART NOTE - NSCHARTNOTEFT_GEN_A_CORE
**INCOMPLETE CHART NOTE     [facility] Stroke Team  Follow-Up Note (wheezing overnight)    HPI: 78 y/o Female with PMHx significant for HTN, HLD and CAD s/p stent who takes ASA presented to [facility] the morning of 6/29/24 with AMS and urinary incontinence and her last known well approximately 530am. CTH at [place] revealed Right parietal lobe hematoma with surrounding vasogenic edema. Patient was transferred to [facility] for further management. Of note, patient received 3mg ativan, haldol and 1g keppra prior to arrival at [facility] ED for both agitation and potential seizures. Once in ED, patient was intubated for airway protection. Patient extubated 6/30/24, tolerating room air, doing well.  Etiology of hemorrhage possibly amyloid angiopathy, pending further imaging. Hypertension likely 2/2 seizure, patient arrived from [place] on Cardene gtt. Per initial triage note at [place], at home patient became unresponsive, foaming at mouth, clenching jaw, snoring respirations with urinary incontinence and tongue biting. Likely new-onset seizures in the setting of ICH.     SUBJECTIVE:   The overnight provider was called by RN due to pt with worsening wheezing and rhonchi, per day RN signing off to night RN. The family at the bedside also stated that the pt's wheezing/rhonchi has increased. At this time, the pt was supposed to go down for MRI. Given the pt's respiratory status was worsening, pt was held from MRI until workup for worsening respiratory status was completed and cleared. [facility] Stroke Team  Follow-Up Note (wheezing and rhonchi overnight)    HPI: 80 y/o Female with PMHx significant for HTN, HLD and CAD s/p stent who takes ASA presented to [facility] the morning of 6/29/24 with AMS and urinary incontinence. Per initial triage note at [place], at home patient became unresponsive, foaming at mouth, clenching jaw, snoring respirations with urinary incontinence and tongue biting. Last known well approximately 530am the morning of presentation. CTH at [place] revealed Right parietal lobe hematoma with surrounding vasogenic edema. Patient was transferred to [facility] for further management. Of note, patient received 3mg ativan, haldol and 1g Keppra prior to arrival at [facility] ED for both agitation and potential seizures. Once in ED, patient was intubated for airway protection.  Patient was extubated in Neuro ICU 6/30/24 with post-extubation wheezing/rhonchi.  Treated with Solumedrol 125 mg IVP followed by Solucortef 40 mg IVP Q 6h x 3 doses  Pt was Tx again with Prednisone 40 Mg po daily x 3 doses.  Pt takes Singulair 10 mg po daily at home for allergies/post nasal drip, per the daughter.  Previous CXR with slight scattered infiltrates, no evidence of infectious process on CXR but improving. Pt being Tx with Duonebs Q 6 Prn Sob/Wheezing and Xopenex Q 6 Hrs Prn SOB/Wheezing.      SUBJECTIVE:   The overnight provider was called by RN due to pt with worsening wheezing and rhonchi, per day RN signing off to night RN. The family at the bedside also stated that the pt's "breathing has gotten louder". At this time, the pt was supposed to go down for MRI. Given the pt's respiratory status was worsening, pt was held from MRI until workup for worsening respiratory status was completed and cleared. Patient denied chest pain, dizziness, nausea, vomiting and SOB as translated by the daughter.  The overnight provider discussed the case with the Hospitalist who is recommending CTA chest to r/o PE, r/o PNA. They spoke to the pt's daughter at the bedside who wanted to speak with her family and day team regarding the CTA chest. After sign out from overnight provider, I examined the patient at the bedside. Upon discussion with the patient's daughter at this time, she expressed concerns regarding the adverse effects of contrast needed for imaging. I explained risks vs benefits and we ultimately decided to discuss further upon morning rounds with the rest of the stroke team and attending, Dr. Lenz.       OBJECTIVE:  Vitals:  BP: 180/75  HR: 93   RR: 20  O2 Sat: 99% on NC 4L    General: pt seen laying down flat in bed, (+) respiratory distress, (+) audible wheezing and rhonchi, no accessory muscle use  Cardiac: S1, S2 auscultated  Lungs: (+) rhonchi, (+) wheezing on auscultation b/l  Skin: no pallor, clean, warm and dry      ASSESSMENT:  80 y/o Female with PMHx significant for HTN, HLD and CAD s/p stent who takes ASA presented to [facility] the morning of 6/29/24 with AMS and urinary incontinence and her last known well approximately 530am. CTH at [place] revealed Right parietal lobe hematoma with surrounding vasogenic edema. Patient was transferred to [facility] for further management. Of note, patient received 3mg ativan, haldol and 1g keppra prior to arrival at [facility] ED for both agitation and potential seizures. Once in ED, patient was intubated for airway protection. Patient extubated 6/30/24, tolerating room air, doing well. Etiology of hemorrhage possibly amyloid angiopathy, pending further imaging.  Hypertension likely 2/2 seizure, patient arrived from [place] on Cardene gtt. Per initial triage note at [place], at home patient became unresponsive, foaming at mouth, clenching jaw, snoring respirations with urinary incontinence and tongue biting. Likely new-onset seizures in the setting of ICH.       PLAN:    1. Wheezing, Rhonchi  -ABG overnight : pH, Arterial: 7.440 pH, Blood: x     /  pCO2: 28    /  pO2: 75    / HCO3: 19    / Base Excess: -5.2  /  SaO2: 96.7    -CXR with mild pulmonary congestion, Lasix 20mg IV x2 given  -RVP negative  -On PO steroids for airway inflammation s/p extubation. Solumedrol 40mg IVP x2 given overnight.  -Additional Xopenex treatment given overnight.  -Continue to monitor for respiratory distress  -Vitals stable at this time  -Pulmonology consult  -Empiric treatment with antibiotics  -proBNP    2. Right Cortical Hemorrhage  -MRI w/w/o pending [facility] Stroke Team  Follow-Up Note (wheezing and rhonchi overnight)    HPI: 78 y/o Female with PMHx significant for HTN, HLD and CAD s/p stent who takes ASA presented to [facility] the morning of 6/29/24 with AMS and urinary incontinence. Per initial triage note at [place], at home patient became unresponsive, foaming at mouth, clenching jaw, snoring respirations with urinary incontinence and tongue biting. Last known well approximately 530am the morning of presentation. CTH at [place] revealed Right parietal lobe hematoma with surrounding vasogenic edema. Patient was transferred to [facility] for further management. Of note, patient received 3mg ativan, haldol and 1g Keppra prior to arrival at [facility] ED for both agitation and potential seizures. Once in ED, patient was intubated for airway protection.  Patient was extubated in Neuro ICU 6/30/24 with post-extubation wheezing/rhonchi.  Treated with Solumedrol 125 mg IVP followed by Solucortef 40 mg IVP Q 6h x 3 doses  Pt was Tx again with Prednisone 40 Mg po daily x 3 doses.  Pt takes Singulair 10 mg po daily at home for allergies/post nasal drip, per the daughter.  Previous CXR with slight scattered infiltrates, no evidence of infectious process on CXR but improving. Pt being Tx with Duonebs Q 6 Prn Sob/Wheezing and Xopenex Q 6 Hrs Prn SOB/Wheezing.      SUBJECTIVE:   The overnight provider was called by RN due to pt with worsening wheezing and rhonchi, per day RN signing off to night RN. The family at the bedside also stated that the pt's "breathing has gotten louder". At this time, the pt was supposed to go down for MRI. Given the pt's respiratory status was worsening, pt was held from MRI until workup for worsening respiratory status was completed and cleared. Patient denied chest pain, dizziness, nausea, vomiting and SOB as translated by the daughter.  The overnight provider discussed the case with the Hospitalist who is recommending CTA chest to r/o PE, r/o PNA. They spoke to the pt's daughter at the bedside who wanted to speak with her family and day team regarding the CTA chest. After sign out from overnight provider, I examined the patient at the bedside. Upon discussion with the patient's daughter at this time, she expressed concerns regarding the adverse effects of contrast needed for imaging. I explained risks vs benefits and we ultimately decided to discuss further upon morning rounds with the rest of the stroke team and attending, Dr. Lenz.       OBJECTIVE:  Vitals:  BP: 180/75  HR: 93   RR: 20  O2 Sat: 99% on NC 4L    General: pt seen laying down flat in bed, (+) respiratory distress, (+) audible wheezing and rhonchi, no accessory muscle use  Cardiac: S1, S2 auscultated  Lungs: (+) rhonchi, (+) wheezing on auscultation b/l  Skin: no pallor, clean, warm and dry      ASSESSMENT:  78 y/o Female with PMHx significant for HTN, HLD and CAD s/p stent who takes ASA presented to [facility] the morning of 6/29/24 with AMS and urinary incontinence and her last known well approximately 530am. CTH at [place] revealed Right parietal lobe hematoma with surrounding vasogenic edema. Patient was transferred to [facility] for further management. Of note, patient received 3mg ativan, haldol and 1g keppra prior to arrival at [facility] ED for both agitation and potential seizures. Once in ED, patient was intubated for airway protection. Patient extubated 6/30/24, tolerating room air, doing well. Etiology of hemorrhage possibly amyloid angiopathy, pending further imaging.  Hypertension likely 2/2 seizure, patient arrived from [place] on Cardene gtt. Per initial triage note at [place], at home patient became unresponsive, foaming at mouth, clenching jaw, snoring respirations with urinary incontinence and tongue biting. Likely new-onset seizures in the setting of ICH.       PLAN:    1. Wheezing, Rhonchi  -ABG overnight : pH, Arterial: 7.440 pH, Blood: x /  pCO2: 28    /  pO2: 75    / HCO3: 19    / Base Excess: -5.2  /  SaO2: 96.7    -CXR with mild pulmonary congestion, Lasix 20mg IV x2 given  -RVP negative  -On PO steroids for airway inflammation s/p extubation. Solumedrol 40mg IVP x2 given overnight.  -Additional Xopenex treatment given overnight.  -Continue to monitor for respiratory distress  -Vitals stable at this time  -Pulmonology consulted urgently  -ICU team consulted as well  -Pulmicort   -Consideration for treatment with empiric antibiotics depending on pulmonary input  -proBNP    2. Right Cortical Hemorrhage  -MRI w/w/o pending [facility] Stroke Team  Follow-Up Note (wheezing and rhonchi overnight)    HPI: 78 y/o Female with PMHx significant for HTN, HLD and CAD s/p stent who takes ASA presented to [facility] the morning of 6/29/24 with AMS and urinary incontinence. Per initial triage note at [place], at home patient became unresponsive, foaming at mouth, clenching jaw, snoring respirations with urinary incontinence and tongue biting. Last known well approximately 530am the morning of presentation. CTH at [place] revealed Right parietal lobe hematoma with surrounding vasogenic edema. Patient was transferred to [facility] for further management. Of note, patient received 3mg ativan, haldol and 1g Keppra prior to arrival at [facility] ED for both agitation and potential seizures. Once in ED, patient was intubated for airway protection.  Patient was extubated in Neuro ICU 6/30/24 with post-extubation wheezing/rhonchi.  Treated with Solumedrol 125 mg IVP followed by Solucortef 40 mg IVP Q 6h x 3 doses  Pt was Tx again with Prednisone 40 Mg po daily x 3 doses.  Pt takes Singulair 10 mg po daily at home for allergies/post nasal drip, per the daughter.  Previous CXR with slight scattered infiltrates, no evidence of infectious process on CXR but improving. Pt being Tx with Duonebs Q 6 Prn Sob/Wheezing and Xopenex Q 6 Hrs Prn SOB/Wheezing.      SUBJECTIVE:   The overnight provider was called by RN due to pt with worsening wheezing and rhonchi, per day RN signing off to night RN. The family at the bedside also stated that the pt's "breathing has gotten louder". At this time, the pt was supposed to go down for MRI. Given the pt's respiratory status was worsening, pt was held from MRI until workup for worsening respiratory status was completed and cleared. Patient denied chest pain, dizziness, nausea, vomiting and SOB as translated by the daughter.  The overnight provider discussed the case with the Hospitalist who is recommending CTA chest to r/o PE, r/o PNA. They spoke to the pt's daughter at the bedside who wanted to speak with her family and day team regarding the CTA chest. After sign out from overnight provider, I examined the patient at the bedside. Upon discussion with the patient's daughter at this time, she expressed concerns regarding the adverse effects of contrast needed for imaging. I explained risks vs benefits and we ultimately decided to discuss further upon morning rounds with the rest of the stroke team and attending, Dr. Lenz.       OBJECTIVE:  Vitals:  BP: 180/75  HR: 93   RR: 20  O2 Sat: 99% on NC 4L    General: pt seen laying down flat in bed, (+) respiratory distress, (+) audible wheezing and rhonchi, no accessory muscle use  Cardiac: S1, S2 auscultated  Lungs: (+) rhonchi, (+) wheezing on auscultation b/l  Skin: no pallor, clean, warm and dry      ASSESSMENT:  78 y/o Female with PMHx significant for HTN, HLD and CAD s/p stent who takes ASA presented to [facility] the morning of 6/29/24 with AMS and urinary incontinence and her last known well approximately 530am. CTH at [place] revealed Right parietal lobe hematoma with surrounding vasogenic edema. Patient was transferred to [facility] for further management. Of note, patient received 3mg ativan, haldol and 1g keppra prior to arrival at [facility] ED for both agitation and potential seizures. Once in ED, patient was intubated for airway protection. Patient extubated 6/30/24, tolerating room air, doing well. Etiology of hemorrhage possibly amyloid angiopathy, pending further imaging.  Hypertension likely 2/2 seizure, patient arrived from [place] on Cardene gtt. Per initial triage note at [place], at home patient became unresponsive, foaming at mouth, clenching jaw, snoring respirations with urinary incontinence and tongue biting. Likely new-onset seizures in the setting of ICH.       PLAN:    1. Wheezing, Rhonchi  -ABG overnight : pH, Arterial: 7.440 pH, Blood: x /  pCO2: 28    /  pO2: 75    / HCO3: 19    / Base Excess: -5.2  /  SaO2: 96.7    -CXR with mild pulmonary congestion, Lasix 20mg IV x2 given  -RVP negative  -On PO steroids for airway inflammation s/p extubation. Solumedrol 40mg IVP x2 given overnight.  -Additional Xopenex treatment given overnight.  -Continue to monitor for respiratory distress  -Vitals stable at this time  -Pulmonology consulted urgently  -ICU team consulted as well  -Pulmicort   -Consideration for treatment with empiric antibiotics depending on pulmonary input  -proBNP    2. Right Cortical Hemorrhage  -MRI w/w/o pending    Plan was extensively discussed with pulmonology, NICU, and MICU teams.      This patient had a critical neurologic presentation and was at risk for neurologic deterioration.  Critical care was needed to prevent CNS failure or compromise.  Critical care time spent at the patient's bedside, obtaining relevant history, developing a treatment plan, managing the patient's acute symptoms, reviewing relevant data and imaging, and discussing the plan with all relevant parties including the other providers, the patient and/or the patient's family:  38 minutes

## 2024-07-02 NOTE — PROGRESS NOTE ADULT - ASSESSMENT
A/P: 80 y/o with hx of HTN, HLD, CAD s/p stent in 2000 (family unsure as to what type of stent it is), takes ASA and Crestor presented to [facility] with AMS and urinary incontinence where LKW was 5:30 AM prior to arrival. CTH in [place] revealed R parietal lobe hematoma. Pt transferred to [facility] for further management. Once arrival in [facility] ED, patient was gargling with respirations, so pt was intubated for airway protection. Cardiology consulted for elevated troponin.

## 2024-07-02 NOTE — PROGRESS NOTE ADULT - ASSESSMENT
ASSESSMENT:  78 y/o Female with PMHx significant for HTN, HLD and CAD s/p stent who takes ASA presented to [facility] the morning of 6/29/24 with AMS and urinary incontinence and her last known well approximately 530am. CTH at [place] revealed Right parietal lobe hematoma with surrounding vasogenic edema. Patient was transferred to [facility] for further management. Of note, patient recieved 3mg ativan, haldol and 1g keppra prior to arrival at [facility] ED for both agitation and potential seizures. Once in ED, patient was intubated for airway protection.     Patient extubated 6/30/24.    Etiology of hemorrhage possibly amyloid angiopathy, pending further imaging.  Hypertension likely 2/2 seizure, patient arrived from [place] on Cardene gtt.  Per initial triage note at [place], at home patient became unresponsive, foaming at mouth, clenching jaw, snoring respirations with urinary incontinence and tongue biting.  Likely new-onset seizures in the setting of ICH.     Patient now with worsening respiratory distress and bronchospasm, made NPO, on continuous BiPAP, ?CHF exacerbation 2/2 HFpEF?  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 Gege Torre    NEURO:  #Right parietal lobe hematoma with surrounding vasogenic edema   #New onset seizures  -Neurologically patient stable   -Continue close monitoring for neurological deterioration  -Stroke neuro checks q 2 hours  -Pending MR Head w/wo, assess for underlying contributory lesion    -SBP Goal 100-160 mmHg, avoiding rapid fluctuations and hypotension  -ANTITHROMBOTIC THERAPY: On hold, ICH -- patient on ASA at home (hx CAD), pending timeline for reinitiation after MR  -STATIN THERAPY: On hold due to NPO status -- Atorvastatin 40mg PO daily, home med therapeutic interchange   -AED: Keppra 1000mg PO BID   -Seizure precautions  -Dysphagia Screening: PASS  -LDL 47  -A1C 5.8    CARDIAC:  #Pulmonary congestion #HFpEF #HTN #HLD #hx CAD, s/p PCI  -VS q 2 hours  - -->7067   -TTE as noted: EF 60-65%  -Troponin downtrending, Trop I 308.6 --> Trop T 55, ?demand ischemia  -s/p furosemide 20mg IVP x 1 dose  -ASA being held at this time due to ICH, pending MR   -Continue cardiac monitoring pending findings of further workup    PULM:  #Acute hypoxemic respiratory failure #Bronchospasm #?hx Asthma #?Aspiration #?Pulmonary edema  -Patient tolerating BiPAP  -No evidence of infectious process on CXR -- clear lungs on most recent XR  -Continue Xopenex q 6 hours PRN SOB/wheezing  -Continue Duoneb q 6 hours PRN SOB/wheezing  -Solumedrol 40mg IVP q 6 hours  -Mag 2g IVPB for asthma   -Sputum cx from ETT w/ few Gram positive cocci  -Incentive spirometry, encourage mobility   -Continue to maintain spO2 > 94%    HEME:  #Normocytic anemia  -H/H 10.9/32.1  -Platelets 194  -Continue to monitor, CBC q daily  -DVT ppx: Lovenox    GI:  -Abdominal exam benign, patient with no complaints  -Diet: NPO  -Bowel regimen: Held at this time     RENAL/METABOLIC:  #Hypokalemia, resolved #Hypophosphatemia, resolved   -BUN/Cr 17.7/0.85, GFR 70  -Patient voiding well w/o difficulty, continue I&O's   -Electrolytes WNL, replete PRN  -BMP q daily, serum Mag and Phos q daily     ENDOCRINE:  -FS TID in the setting of steroids, NPO status  -A1C 5.8  -TSH 2.35      ID:  #Leukocytosis  -WBC continuing to uptrend, 20k today  -Afebrile, leukocytosis ?2/2 steroid use, continue to monitor  -Continue to monitor for signs/symptoms of infection    OTHER:   -Condition discussed w/ patient and family at bedside.  -Patient transferred to Medicine service under Hospitalist Dr. Gege Torre, will continue to follow     DISPO: Pending clinical course      CORE MEASURES:        Admission NIHSS: 2     Tenecteplase : [] YES [x] NO      LDL/A1C: 47/5.8     Depression Screen- if depression hx and/or present      Statin Therapy: Atorvastatin 40mg     Dysphagia Screen: [x] PASS [] FAIL     Smoking [] YES [x] NO      Afib [] YES [x] NO     Stroke Education [] YES [] NO [x] PENDING

## 2024-07-02 NOTE — CONSULT NOTE ADULT - SUBJECTIVE AND OBJECTIVE BOX
Patient is a 79y old  Female who presents with a chief complaint of IPH ?seizure (02 Jul 2024 12:53)      BRIEF HOSPITAL COURSE: 79F with MHx significant for HTN, HLD and CAD s/p stent who takes ASA presented to [facility] this morning with AMS and urinary incontinence and her last known well approximately 530am. CTH at [place] revealed R parietal lobe hematoma. Patient was transferred to [facility] for further management. Of note, patient recieved 3mg ativan, haldol and 1g keppra prior to arrival at [facility] ED for both agitation and potential seizures. Once in ED, ED providers concerned for patient's airway as she was gargling with respirations. Decision made by ED staff to intubate patient for airway protection. Pt was extubated 22 hours later on 6/30. Pt since with intermittent difficulty breathing and audible wheezing noted to be worse overnight. Treated with Solumedrol 125 mg IVP followed by Solucortef 40 mg IVP Q 6h x 3 doses  Pt was Tx again with Prednisone 40 Mg po daily x 3 doses.  Pt takes Singulair 10 mg po daily at home for allergies/post nasal drip, per the daughter.  Previous CXR with slight scattered infiltrates, no evidence of infectious process on CXR but improving. Pt being Tx with Duonebs Q 6 Prn Sob/Wheezing and Xopenex Q 6 Hrs Prn SOB/Wheezing.    PAST MEDICAL & SURGICAL HISTORY:    Allergies    penicillins (Unknown)    Intolerances      FAMILY HISTORY:      Family history otherwise noncontributory.    Social History:     Review of Systems:      ALL OTHER REVIEW OF SYSTEMS EXCEPT PER HPI NEGATIVE.      Medications:    hydrALAZINE Injectable 10 milliGRAM(s) IV Push every 2 hours PRN  labetalol Injectable 10 milliGRAM(s) IV Push every 2 hours PRN  metoprolol tartrate 25 milliGRAM(s) Oral two times a day    albuterol/ipratropium for Nebulization 3 milliLiter(s) Nebulizer every 4 hours  buDESOnide    Inhalation Suspension 0.5 milliGRAM(s) Inhalation every 12 hours  levalbuterol Inhalation 0.63 milliGRAM(s) Inhalation every 6 hours PRN  montelukast 10 milliGRAM(s) Oral daily    acetaminophen     Tablet .. 650 milliGRAM(s) Oral every 6 hours PRN  levETIRAcetam 1000 milliGRAM(s) Oral two times a day  sertraline 100 milliGRAM(s) Oral daily      enoxaparin Injectable 40 milliGRAM(s) SubCutaneous every 24 hours    pantoprazole    Tablet 40 milliGRAM(s) Oral before breakfast  polyethylene glycol 3350 17 Gram(s) Oral daily  senna Syrup 10 milliLiter(s) Oral at bedtime      atorvastatin 40 milliGRAM(s) Oral at bedtime  dextrose 50% Injectable 25 Gram(s) IV Push once  dextrose 50% Injectable 12.5 Gram(s) IV Push once  dextrose Oral Gel 15 Gram(s) Oral once PRN  glucagon  Injectable 1 milliGRAM(s) IntraMuscular once  insulin lispro (ADMELOG) corrective regimen sliding scale   SubCutaneous three times a day before meals  insulin lispro (ADMELOG) corrective regimen sliding scale   SubCutaneous at bedtime  methylPREDNISolone sodium succinate Injectable 40 milliGRAM(s) IV Push every 6 hours    dextrose 10% Bolus 125 milliLiter(s) IV Bolus once  dextrose 5%. 1000 milliLiter(s) IV Continuous <Continuous>  dextrose 5%. 1000 milliLiter(s) IV Continuous <Continuous>      lidocaine   4% Patch 1 Patch Transdermal every 24 hours            ICU Vital Signs Last 24 Hrs  T(C): 36.9 (02 Jul 2024 13:00), Max: 37.6 (01 Jul 2024 21:45)  T(F): 98.4 (02 Jul 2024 13:00), Max: 99.6 (01 Jul 2024 21:45)  HR: 96 (02 Jul 2024 13:00) (89 - 113)  BP: 165/74 (02 Jul 2024 13:00) (131/59 - 180/79)  BP(mean): 108 (01 Jul 2024 17:03) (81 - 108)  ABP: --  ABP(mean): --  RR: 20 (02 Jul 2024 13:00) (19 - 24)  SpO2: 100% (02 Jul 2024 13:00) (95% - 100%)    O2 Parameters below as of 02 Jul 2024 13:00  Patient On (Oxygen Delivery Method): nasal cannula  O2 Flow (L/min): 4        Vital Signs Last 24 Hrs  T(C): 36.9 (02 Jul 2024 13:00), Max: 37.6 (01 Jul 2024 21:45)  T(F): 98.4 (02 Jul 2024 13:00), Max: 99.6 (01 Jul 2024 21:45)  HR: 96 (02 Jul 2024 13:00) (89 - 113)  BP: 165/74 (02 Jul 2024 13:00) (131/59 - 180/79)  BP(mean): 108 (01 Jul 2024 17:03) (81 - 108)  RR: 20 (02 Jul 2024 13:00) (19 - 24)  SpO2: 100% (02 Jul 2024 13:00) (95% - 100%)    Parameters below as of 02 Jul 2024 13:00  Patient On (Oxygen Delivery Method): nasal cannula  O2 Flow (L/min): 4      ABG - ( 02 Jul 2024 01:15 )  pH, Arterial: 7.440 pH, Blood: x     /  pCO2: 28    /  pO2: 75    / HCO3: 19    / Base Excess: -5.2  /  SaO2: 96.7                I&O's Detail    01 Jul 2024 07:01  -  02 Jul 2024 07:00  --------------------------------------------------------  IN:    IV PiggyBack: 100 mL    Oral Fluid: 1140 mL    sodium chloride 0.9%: 150 mL  Total IN: 1390 mL    OUT:    Dexmedetomidine: 0 mL    Indwelling Catheter - Urethral (mL): 220 mL    Voided (mL): 2050 mL  Total OUT: 2270 mL    Total NET: -880 mL      02 Jul 2024 07:01  -  02 Jul 2024 13:26  --------------------------------------------------------  IN:    Oral Fluid: 200 mL  Total IN: 200 mL    OUT:    Incontinent per Collection Bag (mL): 300 mL  Total OUT: 300 mL    Total NET: -100 mL            LABS:                        11.7   20.43 )-----------( 265      ( 02 Jul 2024 05:30 )             34.3     07-02    141  |  107  |  17.7  ----------------------------<  120<H>  3.6   |  19.0<L>  |  0.85    Ca    8.9      02 Jul 2024 05:30  Phos  3.9     07-02  Mg     1.9     07-02            CAPILLARY BLOOD GLUCOSE      POCT Blood Glucose.: 118 mg/dL (02 Jul 2024 12:06)      Urinalysis Basic - ( 02 Jul 2024 05:30 )    Color: x / Appearance: x / SG: x / pH: x  Gluc: 120 mg/dL / Ketone: x  / Bili: x / Urobili: x   Blood: x / Protein: x / Nitrite: x   Leuk Esterase: x / RBC: x / WBC x   Sq Epi: x / Non Sq Epi: x / Bacteria: x      CULTURES:  Culture Results:   Normal Respiratory Nereyda present (06-30 @ 10:32)  Culture Results:   No growth at 48 Hours (06-29 @ 15:00)  Culture Results:   No growth at 48 Hours (06-29 @ 14:50)  Culture Results:   <10,000 CFU/mL Normal Urogenital Nereyda (06-29 @ 07:41)  Culture Results:   No growth at 72 Hours (06-29 @ 07:15)  Culture Results:   No growth at 72 Hours (06-29 @ 07:10)      Physical Examination:  GENERAL: In NAD   HEENT: NC/AT  NECK: Supple, trachea midline  PULM: ***  CVS: +S1, S2, RRR  ABD: Soft, non-tender  EXTREMITIES: No pedal edema B/L  SKIN: No open wounds  NEURO: Grossly non-focal    DEVICES:     RADIOLOGY: ***     Patient is a 79y old  Female who presents with a chief complaint of IPH ?seizure (02 Jul 2024 12:53)      BRIEF HOSPITAL COURSE: 79F with MHx significant for HTN, HLD and CAD s/p stent who takes ASA presented to [facility] this morning with AMS and urinary incontinence and her last known well approximately 530am. CTH at [place] revealed R parietal lobe hematoma. Patient was transferred to [facility] for further management. Of note, patient recieved 3mg ativan, haldol and 1g keppra prior to arrival at [facility] ED for both agitation and potential seizures. Once in ED, ED providers concerned for patient's airway as she was gargling with respirations. Decision made by ED staff to intubate patient for airway protection. Pt was extubated 22 hours later on 6/30. Pt since with intermittent difficulty breathing and audible wheezing noted to be worse overnight. Treated with Solumedrol 125 mg IVP followed by Solucortef 40 mg IVP Q 6h x 3 doses  Pt was Tx again with Prednisone 40 Mg po daily x 3 doses.  Pt takes Singulair 10 mg po daily at home for allergies/post nasal drip, per the daughter.  Previous CXR with slight scattered infiltrates, no evidence of infectious process on CXR but improving. Pt being Tx with Duonebs Q 6 Prn Sob/Wheezing and Xopenex Q 6 Hrs Prn SOB/Wheezing. Seen by pulmonary and started on continuous BIPAP. When seen at bedside, patient on BIPAP feeling better.    Allergies    penicillins (Unknown)    Intolerances      FAMILY HISTORY:      Family history otherwise noncontributory.    Social History:     Review of Systems:      ALL OTHER REVIEW OF SYSTEMS EXCEPT PER HPI NEGATIVE.      Medications:    hydrALAZINE Injectable 10 milliGRAM(s) IV Push every 2 hours PRN  labetalol Injectable 10 milliGRAM(s) IV Push every 2 hours PRN  metoprolol tartrate 25 milliGRAM(s) Oral two times a day    albuterol/ipratropium for Nebulization 3 milliLiter(s) Nebulizer every 4 hours  buDESOnide    Inhalation Suspension 0.5 milliGRAM(s) Inhalation every 12 hours  levalbuterol Inhalation 0.63 milliGRAM(s) Inhalation every 6 hours PRN  montelukast 10 milliGRAM(s) Oral daily    acetaminophen     Tablet .. 650 milliGRAM(s) Oral every 6 hours PRN  levETIRAcetam 1000 milliGRAM(s) Oral two times a day  sertraline 100 milliGRAM(s) Oral daily      enoxaparin Injectable 40 milliGRAM(s) SubCutaneous every 24 hours    pantoprazole    Tablet 40 milliGRAM(s) Oral before breakfast  polyethylene glycol 3350 17 Gram(s) Oral daily  senna Syrup 10 milliLiter(s) Oral at bedtime      atorvastatin 40 milliGRAM(s) Oral at bedtime  dextrose 50% Injectable 25 Gram(s) IV Push once  dextrose 50% Injectable 12.5 Gram(s) IV Push once  dextrose Oral Gel 15 Gram(s) Oral once PRN  glucagon  Injectable 1 milliGRAM(s) IntraMuscular once  insulin lispro (ADMELOG) corrective regimen sliding scale   SubCutaneous three times a day before meals  insulin lispro (ADMELOG) corrective regimen sliding scale   SubCutaneous at bedtime  methylPREDNISolone sodium succinate Injectable 40 milliGRAM(s) IV Push every 6 hours    dextrose 10% Bolus 125 milliLiter(s) IV Bolus once  dextrose 5%. 1000 milliLiter(s) IV Continuous <Continuous>  dextrose 5%. 1000 milliLiter(s) IV Continuous <Continuous>      lidocaine   4% Patch 1 Patch Transdermal every 24 hours            ICU Vital Signs Last 24 Hrs  T(C): 36.9 (02 Jul 2024 13:00), Max: 37.6 (01 Jul 2024 21:45)  T(F): 98.4 (02 Jul 2024 13:00), Max: 99.6 (01 Jul 2024 21:45)  HR: 96 (02 Jul 2024 13:00) (89 - 113)  BP: 165/74 (02 Jul 2024 13:00) (131/59 - 180/79)  BP(mean): 108 (01 Jul 2024 17:03) (81 - 108)  ABP: --  ABP(mean): --  RR: 20 (02 Jul 2024 13:00) (19 - 24)  SpO2: 100% (02 Jul 2024 13:00) (95% - 100%)    O2 Parameters below as of 02 Jul 2024 13:00  Patient On (Oxygen Delivery Method): nasal cannula  O2 Flow (L/min): 4        Vital Signs Last 24 Hrs  T(C): 36.9 (02 Jul 2024 13:00), Max: 37.6 (01 Jul 2024 21:45)  T(F): 98.4 (02 Jul 2024 13:00), Max: 99.6 (01 Jul 2024 21:45)  HR: 96 (02 Jul 2024 13:00) (89 - 113)  BP: 165/74 (02 Jul 2024 13:00) (131/59 - 180/79)  BP(mean): 108 (01 Jul 2024 17:03) (81 - 108)  RR: 20 (02 Jul 2024 13:00) (19 - 24)  SpO2: 100% (02 Jul 2024 13:00) (95% - 100%)    Parameters below as of 02 Jul 2024 13:00  Patient On (Oxygen Delivery Method): nasal cannula  O2 Flow (L/min): 4      ABG - ( 02 Jul 2024 01:15 )  pH, Arterial: 7.440 pH, Blood: x     /  pCO2: 28    /  pO2: 75    / HCO3: 19    / Base Excess: -5.2  /  SaO2: 96.7                I&O's Detail    01 Jul 2024 07:01  -  02 Jul 2024 07:00  --------------------------------------------------------  IN:    IV PiggyBack: 100 mL    Oral Fluid: 1140 mL    sodium chloride 0.9%: 150 mL  Total IN: 1390 mL    OUT:    Dexmedetomidine: 0 mL    Indwelling Catheter - Urethral (mL): 220 mL    Voided (mL): 2050 mL  Total OUT: 2270 mL    Total NET: -880 mL      02 Jul 2024 07:01  -  02 Jul 2024 13:26  --------------------------------------------------------  IN:    Oral Fluid: 200 mL  Total IN: 200 mL    OUT:    Incontinent per Collection Bag (mL): 300 mL  Total OUT: 300 mL    Total NET: -100 mL            LABS:                        11.7   20.43 )-----------( 265      ( 02 Jul 2024 05:30 )             34.3     07-02    141  |  107  |  17.7  ----------------------------<  120<H>  3.6   |  19.0<L>  |  0.85    Ca    8.9      02 Jul 2024 05:30  Phos  3.9     07-02  Mg     1.9     07-02            CAPILLARY BLOOD GLUCOSE      POCT Blood Glucose.: 118 mg/dL (02 Jul 2024 12:06)      Urinalysis Basic - ( 02 Jul 2024 05:30 )    Color: x / Appearance: x / SG: x / pH: x  Gluc: 120 mg/dL / Ketone: x  / Bili: x / Urobili: x   Blood: x / Protein: x / Nitrite: x   Leuk Esterase: x / RBC: x / WBC x   Sq Epi: x / Non Sq Epi: x / Bacteria: x      CULTURES:  Culture Results:   Normal Respiratory Nereyda present (06-30 @ 10:32)  Culture Results:   No growth at 48 Hours (06-29 @ 15:00)  Culture Results:   No growth at 48 Hours (06-29 @ 14:50)  Culture Results:   <10,000 CFU/mL Normal Urogenital Nereyda (06-29 @ 07:41)  Culture Results:   No growth at 72 Hours (06-29 @ 07:15)  Culture Results:   No growth at 72 Hours (06-29 @ 07:10)      Physical Examination:  GENERAL: In NAD, lying on bed  HEENT: NC/AT  NECK: Supple, trachea midline  PULM: Moderate air entry bilaterally, with expiratory wheezes more audible in the upper airway  CVS: +S1, S2, RRR  ABD: Soft, non-tender  EXTREMITIES: No pedal edema B/L  SKIN: No open wounds  NEURO: Grossly non-focal    DEVICES:     RADIOLOGY:

## 2024-07-02 NOTE — CONSULT NOTE ADULT - TIME BILLING
greater than 50% of time spent reviewing labs, notes, orders and radiographs, coordinating care  discussed with pt, family, Neuro team
coordinating care with pulmonology, ENT, MICU resident, RN, reviewing labs and prior records, personally reviewing and interpreting imaging, documenting findings and assessment in the medical record, counseling family.

## 2024-07-02 NOTE — PROVIDER CONTACT NOTE (OTHER) - SITUATION
PA notified of patient having expiratory wheezes. Chest PT provided on patient.  Respiratory called for PRN duoneb. Stat chest xray ordered, RVP

## 2024-07-02 NOTE — CHART NOTE - NSCHARTNOTEFT_GEN_A_CORE
Went to patients bedside to check on Bipap and patient was found off the machine and eating.  Family member at bedside stated that he took mother off to feed her lunch without nursing, physicians, and respiratory made aware of the situation.    Patient is currently on 02 eating, management notified.

## 2024-07-02 NOTE — PROGRESS NOTE ADULT - ASSESSMENT
79F with MHx significant for HTN, HLD and CAD s/p stent who takes ASA presented to [facility] this morning with AMS and urinary incontinence and her last known well approximately 530am. CTH at [place] revealed R parietal lobe hematoma    #R Parietal lobe hematoma surrounding vasogenic edema  #New onset seizures  Transferred from [facility]  Pending MRI Head w/wo  Hold AC/AP (on ASA pta for CAD)  Atorvastatin when able to tolerate PO  KEPPRA 1000mg BID  Seizure precautions  Dysphagia    #Stridor  S/p intubation for 22hrs, now Bipap  S/p larnygoscopy w concern for subglottic stenosis  Heliox therapy, racemic epi  Steroids- solumedrol 40mg IV q6  ENT eval noted  Pulm following  MICU eval noted, okay for step down    #Pulm  Singulair  Pulmicort  Xopenex  Duoneb q4 PRN    #HTN, HLD and CAD s/p stent  Losartan  Lopressor 25 BID  Hydralazine PRN  Labetalol PRN  Statin  ASA on hold 2/2 bleed    #Psych  Zoloft    #GERD  Pronotix    DVT ppx:   Lovenox   79F with MHx significant for HTN, HLD and CAD s/p stent who takes ASA presented to [facility] this morning with AMS and urinary incontinence and her last known well approximately 530am. CTH at [place] revealed R parietal lobe hematoma    #R Parietal lobe hematoma surrounding vasogenic edema  #New onset seizures  Transferred from [facility]  Pending MRI Head w/wo  Hold AC/AP (on ASA pta for CAD)  Atorvastatin when able to tolerate PO  KEPPRA 1000mg BID  Seizure precautions  Dysphagia    #Stridor  S/p intubation for 22hrs, now Bipap  S/p larnygoscopy w concern for subglottic stenosis  Heliox therapy, racemic epi  Steroids- solumedrol 40mg IV q6  ENT eval noted- CT neck wwo pending  Pulm following  MICU eval noted, okay for step down    #Allergies/Resp distress  Singulair  Pulmicort  Xopenex  Duoneb q4 PRN  Does not seem fluid overloaded, ProBNP approx 7k, Echo 6/30 shows preserved   EF, mild diastolic dysfxn  ABG noted, mild hypoxia  Follow up AM ProBNP, cardio recs noted discussed with Pulm.   CXR clear.   Will give Lasix 40 x 1 for now and re-evaluate  Follow up AM CXR    #HTN, HLD and CAD s/p stent  #HFpEF  Losartan  Lopressor 25 BID  Hydralazine PRN  Labetalol PRN  Statin  ASA on hold 2/2 bleed    #Troponemia  Likely demand from above, trended down and remained flat  No chest pain or ischemic ekg changes    #HypoK, Hypophos  Resolved  Recheck, replete prn    #Psych  Zoloft    #GERD  Pronotix    DVT ppx:   Lovenox   79F with MHx significant for HTN, HLD and CAD s/p stent who takes ASA presented to [facility] this morning with AMS and urinary incontinence and her last known well approximately 530am. CTH at [place] revealed R parietal lobe hematoma    #R Parietal lobe hematoma surrounding vasogenic edema  #New onset seizures  Transferred from [facility]  Pending MRI Head w/wo  Hold AC/AP (on ASA pta for CAD)  Atorvastatin when able to tolerate PO  KEPPRA 1000mg BID  Seizure precautions  Dysphagia    #Stridor  S/p intubation for 22hrs, now Bipap  S/p larnygoscopy w concern for subglottic stenosis  Heliox therapy, racemic epi  Steroids- solumedrol 40mg IV q6  ENT eval noted- CT neck wwo pending  Consider inhaled corticosteroids  Pulm following  MICU eval noted, okay for step down    #Allergies/Resp distress  Singulair  Pulmicort  Xopenex  Duoneb q4 PRN  Does not seem fluid overloaded, ProBNP approx 7k, Echo 6/30 shows preserved   EF, mild diastolic dysfxn  ABG noted, mild hypoxia  Follow up AM ProBNP, cardio recs noted discussed with Pulm.   CXR clear.   Will give Lasix 40 x 1 for now and re-evaluate  Follow up AM CXR    #HTN, HLD and CAD s/p stent  #HFpEF  Losartan  Lopressor 25 BID  Hydralazine PRN  Labetalol PRN  Statin  ASA on hold 2/2 bleed    #Troponemia  Likely demand from above, trended down and remained flat  No chest pain or ischemic ekg changes    #HypoK, Hypophos  Resolved  Recheck, replete prn    #Psych  Zoloft    #GERD  Pronotix    DVT ppx:   Lovenox

## 2024-07-02 NOTE — DISCHARGE NOTE NURSING/CASE MANAGEMENT/SOCIAL WORK - PATIENT PORTAL LINK FT
You can access the FollowMyHealth Patient Portal offered by [facility] by registering at the following website: [email or web address]. By joining [facility]’s FollowMyHealth portal, you will also be able to view your health information using other applications (apps) compatible with our system.

## 2024-07-02 NOTE — CHART NOTE - NSCHARTNOTEFT_GEN_A_CORE
PA NOTE-MEDICINE    Called by RN due to Pt experiencing 3 Beats of V-Tach.  VSS No CP Dizziness   Mag, Phos BMP ordered   Continue to Monitor Pt  Recall PA for any Reoccurrence or for any changes in Pt status   Will sign out to AM Team

## 2024-07-02 NOTE — CONSULT NOTE ADULT - SUBJECTIVE AND OBJECTIVE BOX
79yF was admitted on 06-29 from [facility] with AMS and found to have a right parietal lobe hematoma.      Imaging Reviewed Today:  HEAD CT, CTA HEAD & NECK 6/29 - Age-appropriate involutional and ischemic gliotic changes. No change in right parietal parenchymal hemorrhage since 7:42 AM. Normal CTA of the head and neck.    HEAD CT 6/30 - No change since 6/29/2024. Right small right parietal cortical parenchymal hemorrhage.    ----------------------------  Patient reports she is ok.  Did not sleep well and feels off.  Overall feels that she is improving.   Daughter at bedside.     VITALS  T(C): 36.9 (07-02-24 @ 08:20), Max: 37.6 (07-01-24 @ 21:45)  HR: 89 (07-02-24 @ 08:20) (75 - 113)  BP: 180/79 (07-02-24 @ 08:20) (131/59 - 180/79)  RR: 20 (07-02-24 @ 08:20) (19 - 24)  SpO2: 100% (07-02-24 @ 11:09) (95% - 100%)  Wt(kg): --    PAST MEDICAL & SURGICAL HISTORY       RECENT LABS REVIEWED    CBC Full  -  ( 02 Jul 2024 05:30 )  WBC Count : 20.43 K/uL  RBC Count : 3.95 M/uL  Hemoglobin : 11.7 g/dL  Hematocrit : 34.3 %  Platelet Count - Automated : 265 K/uL  Mean Cell Volume : 86.8 fl  Mean Cell Hemoglobin : 29.6 pg  Mean Cell Hemoglobin Concentration : 34.1 gm/dL  Auto Neutrophil # : x  Auto Lymphocyte # : x  Auto Monocyte # : x  Auto Eosinophil # : x  Auto Basophil # : x  Auto Neutrophil % : x  Auto Lymphocyte % : x  Auto Monocyte % : x  Auto Eosinophil % : x  Auto Basophil % : x    07-02    141  |  107  |  17.7  ----------------------------<  120<H>  3.6   |  19.0<L>  |  0.85    Ca    8.9      02 Jul 2024 05:30  Phos  3.9     07-02  Mg     1.9     07-02      Urinalysis Basic - ( 02 Jul 2024 05:30 )    Color: x / Appearance: x / SG: x / pH: x  Gluc: 120 mg/dL / Ketone: x  / Bili: x / Urobili: x   Blood: x / Protein: x / Nitrite: x   Leuk Esterase: x / RBC: x / WBC x   Sq Epi: x / Non Sq Epi: x / Bacteria: x        ALLERGIES  penicillins (Unknown)      MEDICATIONS   acetaminophen     Tablet .. 650 milliGRAM(s) Oral every 6 hours PRN  albuterol/ipratropium for Nebulization 3 milliLiter(s) Nebulizer every 4 hours  atorvastatin 40 milliGRAM(s) Oral at bedtime  benzocaine/menthol Lozenge 1 Lozenge Oral every 4 hours PRN  buDESOnide    Inhalation Suspension 0.5 milliGRAM(s) Inhalation every 12 hours  dextrose 10% Bolus 125 milliLiter(s) IV Bolus once  dextrose 5%. 1000 milliLiter(s) IV Continuous <Continuous>  dextrose 5%. 1000 milliLiter(s) IV Continuous <Continuous>  dextrose 50% Injectable 25 Gram(s) IV Push once  dextrose 50% Injectable 12.5 Gram(s) IV Push once  dextrose Oral Gel 15 Gram(s) Oral once PRN  enoxaparin Injectable 40 milliGRAM(s) SubCutaneous every 24 hours  furosemide   Injectable 40 milliGRAM(s) IV Push once  glucagon  Injectable 1 milliGRAM(s) IntraMuscular once  hydrALAZINE Injectable 10 milliGRAM(s) IV Push every 2 hours PRN  insulin lispro (ADMELOG) corrective regimen sliding scale   SubCutaneous three times a day before meals  insulin lispro (ADMELOG) corrective regimen sliding scale   SubCutaneous at bedtime  labetalol Injectable 10 milliGRAM(s) IV Push every 2 hours PRN  levalbuterol Inhalation 0.63 milliGRAM(s) Inhalation every 6 hours PRN  levETIRAcetam 1000 milliGRAM(s) Oral two times a day  lidocaine   4% Patch 1 Patch Transdermal every 24 hours  methylPREDNISolone sodium succinate Injectable 40 milliGRAM(s) IV Push every 6 hours  metoprolol tartrate 25 milliGRAM(s) Oral two times a day  montelukast 10 milliGRAM(s) Oral daily  pantoprazole    Tablet 40 milliGRAM(s) Oral before breakfast  polyethylene glycol 3350 17 Gram(s) Oral daily  senna Syrup 10 milliLiter(s) Oral at bedtime  sertraline 100 milliGRAM(s) Oral daily      ----------------------------------------------------------------------------------------  FUNCTIONAL HISTORY  Lives with family, but will be alone in day. 0 CELIO  Independent with [facility]    FUNCTIONAL STATUS/PROGRESS  7/1 PT  Bed Mobility: Supine to Sit:     · Level of Rooks	moderate assist (50% patients effort)  · Physical Assist/Nonphysical Assist	1 person assist; nonverbal cues (demo/gestures); verbal cues    Bed Mobility Analysis:     · Impairments Contributing to Impaired Bed Mobility	impaired postural control; decreased strength; decreased flexibility; impaired balance    Transfer: Sit to Stand:     · Level of Rooks	moderate assist (50% patients effort); + pushing to the right side  · Physical Assist/Nonphysical Assist	1 person + 1 person to manage equipment; verbal cues; nonverbal cues (demo/gestures)  · Assistive Device	hand held assist    Transfer: Stand to Sit:     · Level of Rooks	moderate assist (50% patients effort)  · Physical Assist/Nonphysical Assist	nonverbal cues (demo/gestures); verbal cues; 1 person + 1 person to manage equipment  · Assistive Device	hand held assist    Sit/Stand Transfer Safety Analysis:     · Impairments Contributing to Impaired Transfers	impaired balance; impaired coordination; impaired postural control; decreased strength    Gait Skills:     · Level of Rooks	moderate assist (50% patients effort); mild lean/pushing to the right  · Physical Assist/Nonphysical Assist	nonverbal cues (demo/gestures); verbal cues; 1 person + 1 person to manage equipment  · Assistive Device	hand held assist  · Gait Distance	bed to chair; 5 feet    Gait Analysis:     · Gait Pattern Used	3-point gait  · Gait Deviations Noted	decreased shin; decreased step length; decreased stride length  · Impairments Contributing to Gait Deviations	impaired balance; impaired postural control; decreased strength; impaired motor control; impaired coordination    7/1 OT  Bathing Training:     · Level of Rooks	maximum assist (25% patients effort)    Upper Body Dressing Training:     · Level of Rooks	moderate assist (50% patients effort)    Lower Body Dressing Training:     · Level of Rooks	maximum assist (25% patients effort)    Toilet Hygiene Training:     · Level of Rooks	to assess    Grooming Training:     · Level of Rooks	moderate assist (50% patients effort)      ----------------------------------------------------------------------------------------  PHYSICAL EXAM  Constitutional - NAD, Comfortable  HEENT - NCAT, EOMI  Neck - Supple, No limited ROM  Chest - Breathing comfortably, No wheezing  Cardiovascular - S1S2   Abdomen - Soft   Extremities - No C/C/E, No calf tenderness   Neurologic Exam -                    Cognitive - AAO to self, place, date, year, situation     Communication - Fluent, Mild dysarthria     Cranial Nerves - CN 2-12 intact     FUNCTIONAL MOTOR EXAM - Mild deficits of the left UE      Sensory - Intact to LT   Psychiatric - Mood stable, Affect WNL  ----------------------------------------------------------------------------------------  ASSESSMENT/PLAN  79yFemale with functional deficits after developing an IPH  Right parietal IPH - Stable, Keppra  HTN - Cozaar, Lopressor, Recommend taper Hydralazine IV & Labetalol IV  CAD - Lipitor   Pulm - Duoneb, Pulmicort, Lasix, Solumedrol, Xopenex  Mood - Zoloft  Pain - Tylenol  DVT PPX - SCDs, Lovenox  Rehab/Impaired mobility and function - Patient continues to require hospitalization for the above diagnoses and ongoing active management of comorbid complications (pending MRI, pulm exacerbation pending CXR) that are substantially posing a threat to bodily function, functional ability and quality of life.     RECOMMEND - OOB daily, Turn Q2 when needed, HOB >30 degrees    When medically optimized, based on the patient's diagnosis, current functional status and potential for progress, recommend ACUTE inpatient rehabilitation for the functional deficits consisting of 3 hours of therapy/day & 24 hour RN/daily PMR physician for active comorbid medical management. Patient will be able to tolerate 3 hours a day.     Will continue to follow. Rehab recommendations are dependent on how functional progress changes as well as how patient continues to participate and tolerate therapeutic interventions, WHICH MAY CHANGE UPON FOLLOW UP EXAMINATIONS. Recommend ongoing mobilization by staff to maintain cardiopulmonary function and prevention of secondary complications related to debility/immobility. Discussed the specific management and recommendations above with rehab clinical care team/rehab liaison.      Total Time Spent on Encounter (reviewing clinical notes, labs, radiology, medications, patient history/exam, assessment and plan) - 75 minutes

## 2024-07-02 NOTE — CONSULT NOTE ADULT - PROBLEM SELECTOR RECOMMENDATION 9
-Cont w steroids and nebs per pulm  -Agree w adding heliox and racemic epi  -CT neck to further evaluate for subglottic stenosis-although pt only intubated x 1 day and never before. But Classic straw breathing  ENT will continue to follow
- Troponin T 113 -> 119.   - serial troponin, serial ekg  - EKG NSR with non ischemia   - will check TTE for evaluation of cardiac function and any valvular abnormalities  - check proBNP   - pt shows no signs and symptoms of anginal equivalent symptoms.  - not a candidate for invasive cardiac therapy given use of heparin products and ICH.   - daughter and son at bedside agreeable to plan.

## 2024-07-02 NOTE — CONSULT NOTE ADULT - CRITICAL CARE ATTENDING COMMENT
greater than 50% of time spent reviewing labs, notes, orders and radiographs, coordinating care  discussed with pt, family, neuro team

## 2024-07-02 NOTE — PROGRESS NOTE ADULT - SUBJECTIVE AND OBJECTIVE BOX
[facility]                                                         CARDIOLOGY AT [place] [address]                                                         Telephone: [phone]. Fax:[phone]                                                                             PROGRESS NOTE    Reason for follow up: Acute HFpEF  Update: Patient with worsening wheezing and stridor since last night. Patient also noted to have worsening hypertension as well, and pBNP now up to 7K from 1K.       Review of symptoms:   Cardiac:  No chest pain. No dyspnea. No palpitations.  Respiratory: no cough. No dyspnea  Gastrointestinal: No diarrhea. No abdominal pain. No bleeding.   Neuro: No focal neuro complaints.    Vitals:  T(C): 36.9 (07-02-24 @ 08:20), Max: 37.6 (07-01-24 @ 21:45)  HR: 89 (07-02-24 @ 08:20) (84 - 113)  BP: 180/79 (07-02-24 @ 08:20) (131/59 - 180/79)  RR: 20 (07-02-24 @ 08:20) (19 - 24)  SpO2: 100% (07-02-24 @ 11:09) (95% - 100%)  Wt(kg): --  I&O's Summary    01 Jul 2024 07:01  -  02 Jul 2024 07:00  --------------------------------------------------------  IN: 1390 mL / OUT: 2270 mL / NET: -880 mL    02 Jul 2024 07:01  -  02 Jul 2024 12:53  --------------------------------------------------------  IN: 200 mL / OUT: 300 mL / NET: -100 mL      Weight (kg): 77.3 (06-29 @ 14:15), 75.7 (06-29 @ 06:55)      PHYSICAL EXAM:  Appearance: Comfortable. No acute distress  HEENT:  Atraumatic. Normocephalic.  Normal oral mucosa  Neurologic: A & O x 3, no gross focal deficits.  Cardiovascular: RRR S1 S2, No murmur, no rubs/gallops. No JVD  Respiratory: Diffuse wheezing and stridor  Gastrointestinal:  Soft, Non-tender, + BS  Lower Extremities: 2+ Peripheral Pulses, No clubbing, cyanosis, or edema  Psychiatry: Patient is calm. No agitation.   Skin: warm and dry.    CURRENT CARDIAC MEDICATIONS:  hydrALAZINE Injectable 10 milliGRAM(s) IV Push every 2 hours PRN  labetalol Injectable 10 milliGRAM(s) IV Push every 2 hours PRN  metoprolol tartrate 25 milliGRAM(s) Oral two times a day      CURRENT OTHER MEDICATIONS:  albuterol/ipratropium for Nebulization 3 milliLiter(s) Nebulizer every 4 hours  buDESOnide    Inhalation Suspension 0.5 milliGRAM(s) Inhalation every 12 hours  levalbuterol Inhalation 0.63 milliGRAM(s) Inhalation every 6 hours PRN SOB/wheezing  montelukast 10 milliGRAM(s) Oral daily  acetaminophen     Tablet .. 650 milliGRAM(s) Oral every 6 hours PRN Temp greater or equal to 38C (100.4F), Mild Pain (1 - 3)  levETIRAcetam 1000 milliGRAM(s) Oral two times a day  sertraline 100 milliGRAM(s) Oral daily  pantoprazole    Tablet 40 milliGRAM(s) Oral before breakfast, Stop order after: 5 Days  polyethylene glycol 3350 17 Gram(s) Oral daily  senna Syrup 10 milliLiter(s) Oral at bedtime  atorvastatin 40 milliGRAM(s) Oral at bedtime  dextrose 50% Injectable 25 Gram(s) IV Push once, Stop order after: 1 Doses  dextrose 50% Injectable 12.5 Gram(s) IV Push once, Stop order after: 1 Doses  dextrose Oral Gel 15 Gram(s) Oral once, Stop order after: 1 Doses PRN Blood Glucose LESS THAN 70 milliGRAM(s)/deciliter  glucagon  Injectable 1 milliGRAM(s) IntraMuscular once, Stop order after: 1 Doses  insulin lispro (ADMELOG) corrective regimen sliding scale   SubCutaneous three times a day before meals  insulin lispro (ADMELOG) corrective regimen sliding scale   SubCutaneous at bedtime  methylPREDNISolone sodium succinate Injectable 40 milliGRAM(s) IV Push every 6 hours  dextrose 10% Bolus 125 milliLiter(s) IV Bolus once, Stop order after: 1 Doses  dextrose 5%. 1000 milliLiter(s) (100 mL/Hr) IV Continuous <Continuous>  dextrose 5%. 1000 milliLiter(s) (50 mL/Hr) IV Continuous <Continuous>  enoxaparin Injectable 40 milliGRAM(s) SubCutaneous every 24 hours  lidocaine   4% Patch 1 Patch Transdermal every 24 hours      LABS:	 	  ( 29 Jun 2024 07:15 )  Troponin T  X    ,  CPK  104  , CKMB  X    , BNP X                                  11.7   20.43 )-----------( 265      ( 02 Jul 2024 05:30 )             34.3     07-02    141  |  107  |  17.7  ----------------------------<  120<H>  3.6   |  19.0<L>  |  0.85    Ca    8.9      02 Jul 2024 05:30  Phos  3.9     07-02  Mg     1.9     07-02      PT/INR/PTT ( 29 Jun 2024 12:07 )                       :                       :      11.8         :       32.9                  .        .                   .              .           .       1.07        .                                       Lipid Profile: Date: 06-29 @ 14:50  Total cholesterol 128; Direct LDL: --; HDL: 60; Triglycerides:105    HgA1c:   TSH: Thyroid Stimulating Hormone, Serum: 2.35 uIU/mL      TELEMETRY: SR, 3 beats NSVT  ECG:    DIAGNOSTIC TESTING:  [ ] Echocardiogram:   < from: TTE W or WO Ultrasound Enhancing Agent (06.30.24 @ 08:50) >  CONCLUSIONS:      1. Left ventricular cavity is normal in size. Left ventricular systolic function is normal with an ejection fraction visually estimated at 60 to 65 %.   2. There is mild (grade 1) left ventricular diastolic dysfunction.   3. Normal right ventricular cavity size and normal right ventricular systolic function.   4. The left atrium is normal in size.   5. The right atrium is normal in size.   6. Structurally normal mitral valve with normal leaflet excursion.   7. No pericardial effusion seen.   8. Mild tricuspid regurgitation.   9. Estimated pulmonary artery systolic pressure is 38 mmHg.  10. Mild left ventricular hypertrophy.  11. Technically difficult image quality.  12. Trileaflet aortic valve with normal systolic excursion. Fibrocalcific aortic valve sclerosis without stenosis.    < end of copied text >    [ ]  Catheterization:  [ ] Stress Test:    OTHER:

## 2024-07-02 NOTE — CONSULT NOTE ADULT - ASSESSMENT
Acute hypoxemic resp failure  Post ICH, post extubation  Not stridorous , but diffuse wheezing , best heard upper airway  Abd paradox on current exam, prior ABG without hypercapnia  Change IV medrol, nebs, empiric abx for ? aspiration component  ENT eval of upper airway, keep NPO  Trial of NIV, BIPAP, ? Heliox pending ENT eval  ICU evaluation  Critical      Acute hypoxemic resp failure  Post ICH, post extubation  Not stridorous , but diffuse wheezing , best heard upper airway  Abd paradox on current exam, prior ABG without hypercapnia  Not on ACE, beta blocker regimen - cardio selective and same dose as out pt  Change IV medrol, nebs, empiric abx for ? aspiration component  ENT eval of upper airway, keep NPO  Trial of NIV, BIPAP, ? Heliox pending ENT eval  ICU evaluation  Critical      Acute hypoxemic resp failure  Post ICH, post extubation  Not currently stridorous , but diffuse wheezing , best heard upper airway  Abd paradox on current exam, prior ABG without hypercapnia  Not on ACE, beta blocker regimen - cardio selective and same dose as out pt  Change IV medrol, nebs, empiric abx for ? aspiration  component  ENT eval of upper airway, keep NPO, HOB elevated  Trial of NIV, BIPAP, ? Heliox pending ENT eval  ICU evaluation  Critical      Acute hypoxemic resp failure  Post ICH, post extubation  Not currently stridorous , but diffuse wheezing , best heard upper airway  Abd paradox on current exam, prior ABG without hypercapnia  Not on ACE, beta blocker regimen - cardio selective and same dose as out pt, re eval pending ENT input  Change IV medrol, nebs, empiric abx for ? aspiration  component  ENT eval of upper airway, keep NPO, HOB elevated  Trial of NIV, BIPAP, ? Heliox pending ENT eval  ICU evaluation  Critical      Acute hypoxemic resp failure  Post ICH, post extubation  Not currently stridorous , but diffuse wheezing , best heard upper airway  Abd paradox on current exam, prior ABG without hypercapnia  Not on ACE, beta blocker regimen - cardio selective and same dose as out pt, re eval pending ENT input  Change IV medrol, nebs, empiric abx for ? aspiration  component  ENT eval of upper airway, keep NPO, HOB elevated  Trial of NIV, BIPAP, ? Heliox pending ENT eval  BNP noted, diuresis , re check CPK, trop, CXR  ICU evaluation  Critical

## 2024-07-02 NOTE — CONSULT NOTE ADULT - ATTENDING COMMENTS
80 yo female with HTN, CAD s/p PCI, admitted with ICH and seizures, intubated for 1 day and extubated.  ICU eval now requested due to upper airway expiratory wheeze.  Placed on bipap with improvement in wheeze. Laryngoscopy performed by ENT service, no supraglottic obstruction seen, may have subglottic obstruction (but would be unusual to develop subglottic stenosis after 1 day on mechanical ventilation.    Patient seems to have significant mucous, would encourage coughing, chest PT, provide nebulizers and humidified oxygen.

## 2024-07-02 NOTE — PROGRESS NOTE ADULT - SUBJECTIVE AND OBJECTIVE BOX
BIANCA IZQUIERDO  79y  Female      Patient is a 79y old  Female who presents with a chief complaint of IPH ?seizure (02 Jul 2024 13:32)      INTERVAL HPI/OVERNIGHT EVENTS:  79F with MHx significant for HTN, HLD and CAD s/p stent who takes ASA presented to [facility] this morning with AMS and urinary incontinence and her last known well approximately 530am. CTH at [place] revealed R parietal lobe hematoma. Patient was transferred to [facility] for further management. Of note, patient recieved 3mg ativan, haldol and 1g keppra prior to arrival at [facility] ED for both agitation and potential seizures. Once in ED, ED providers concerned for patient's airway as she was gargling with respirations. Decision made by ED staff to intubate patient for airway protection. Pt was extubated 22 hours later on 6/30. Pt since with intermittent difficulty breathing and audible wheezing noted to be worse overnight. Treated with Solumedrol 125 mg IVP followed by Solucortef 40 mg IVP Q 6h x 3 doses  Pt was Tx again with Prednisone 40 Mg po daily x 3 doses.  Pt takes Singulair 10 mg po daily at home for allergies/post nasal drip, per the daughter.  Previous CXR with slight scattered infiltrates, no evidence of infectious process on CXR but improving. Pt being Tx with Duonebs Q 6 Prn Sob/Wheezing and Xopenex Q 6 Hrs Prn SOB/Wheezing.        REVIEW OF SYSTEMS:  +Dyspnea  +Anxiety      T(C): 36.9 (07-02-24 @ 13:00), Max: 37.6 (07-01-24 @ 21:45)  HR: 96 (07-02-24 @ 13:00) (89 - 113)  BP: 165/74 (07-02-24 @ 13:00) (137/59 - 180/79)  RR: 20 (07-02-24 @ 13:00) (20 - 24)  SpO2: 100% (07-02-24 @ 14:15) (95% - 100%)  Wt(kg): --Vital Signs Last 24 Hrs  T(C): 36.9 (02 Jul 2024 13:00), Max: 37.6 (01 Jul 2024 21:45)  T(F): 98.4 (02 Jul 2024 13:00), Max: 99.6 (01 Jul 2024 21:45)  HR: 96 (02 Jul 2024 13:00) (89 - 113)  BP: 165/74 (02 Jul 2024 13:00) (137/59 - 180/79)  BP(mean): 108 (01 Jul 2024 17:03) (82 - 108)  RR: 20 (02 Jul 2024 13:00) (20 - 24)  SpO2: 100% (02 Jul 2024 14:15) (95% - 100%)    Parameters below as of 02 Jul 2024 13:00  Patient On (Oxygen Delivery Method): nasal cannula  O2 Flow (L/min): 4      PHYSICAL EXAM:  Appearance: Elderly, appears weak  HEENT:  Atraumatic. Normocephalic  Neurologic: A & O x 3, no gross focal deficits.  Cardiovascular: RRR S1 S2, No murmur, no rubs/gallops. No JVD  Respiratory: +Stridor  Gastrointestinal:  Soft, Non-tender, + BS  Lower Extremities: 2+ Peripheral Pulses, No clubbing, cyanosis, or edema  Psychiatry: Patient is calm. No agitation.   Skin: warm and dry.  Consultant(s) Notes Reviewed:  [x ] YES  [ ] NO  Care Discussed with Consultants/Other Providers [ x] YES  [ ] NO    LABS:                        11.7   20.43 )-----------( 265      ( 02 Jul 2024 05:30 )             34.3     07-02    141  |  107  |  17.7  ----------------------------<  120<H>  3.6   |  19.0<L>  |  0.85    Ca    8.9      02 Jul 2024 05:30  Phos  3.9     07-02  Mg     1.9     07-02        Urinalysis Basic - ( 02 Jul 2024 05:30 )    Color: x / Appearance: x / SG: x / pH: x  Gluc: 120 mg/dL / Ketone: x  / Bili: x / Urobili: x   Blood: x / Protein: x / Nitrite: x   Leuk Esterase: x / RBC: x / WBC x   Sq Epi: x / Non Sq Epi: x / Bacteria: x      CAPILLARY BLOOD GLUCOSE      POCT Blood Glucose.: 118 mg/dL (02 Jul 2024 12:06)      ABG - ( 02 Jul 2024 01:15 )  pH, Arterial: 7.440 pH, Blood: x     /  pCO2: 28    /  pO2: 75    / HCO3: 19    / Base Excess: -5.2  /  SaO2: 96.7              Urinalysis Basic - ( 02 Jul 2024 05:30 )    Color: x / Appearance: x / SG: x / pH: x  Gluc: 120 mg/dL / Ketone: x  / Bili: x / Urobili: x   Blood: x / Protein: x / Nitrite: x   Leuk Esterase: x / RBC: x / WBC x   Sq Epi: x / Non Sq Epi: x / Bacteria: x        RADIOLOGY & ADDITIONAL TESTS:    Imaging Personally Reviewed:  [ ] YES  [ ] NO    HEALTH ISSUES - PROBLEM Dx:  CAD (coronary artery disease)    Elevated troponin    HTN (hypertension)    Acute bronchospasm    Acute hypoxemic respiratory failure    Stridor    Acute heart failure with preserved ejection fraction (HFpEF)    Hypertension         BIANCA IZQUIERDO  79y  Female      Patient is a 79y old  Female who presents with a chief complaint of IPH ?seizure (02 Jul 2024 13:32)      INTERVAL HPI/OVERNIGHT EVENTS:  79F with MHx significant for HTN, HLD and CAD s/p stent who takes ASA presented to [facility] this morning with AMS and urinary incontinence and her last known well approximately 530am. CTH at [place] revealed R parietal lobe hematoma. Patient was transferred to [facility] for further management. Patient received 3mg ativan, haldol and 1g keppra prior to arrival at [facility] ED for both agitation and potential seizures. Upon arrival concern over patient's airway as she was gargling with respirations, she was intubated for airway protection. Pt was extubated 22 hours later on 6/30. Pt since with intermittent difficulty breathing and audible wheezing noted to be worse overnight. Treated with Solumedrol 125 mg IVP followed by Solucortef 40 mg IVP Q 6h. Transferred to Hospitalist service from Stroke service on 7/2.    Patient seen and examined with son at bedside. S/p BIpap for Lunch, audible stridor. No wheeze on lung exam. No resp distress but mildly anxious    REVIEW OF SYSTEMS:  +Dyspnea  +Anxiety      T(C): 36.9 (07-02-24 @ 13:00), Max: 37.6 (07-01-24 @ 21:45)  HR: 96 (07-02-24 @ 13:00) (89 - 113)  BP: 165/74 (07-02-24 @ 13:00) (137/59 - 180/79)  RR: 20 (07-02-24 @ 13:00) (20 - 24)  SpO2: 100% (07-02-24 @ 14:15) (95% - 100%)  Wt(kg): --Vital Signs Last 24 Hrs  T(C): 36.9 (02 Jul 2024 13:00), Max: 37.6 (01 Jul 2024 21:45)  T(F): 98.4 (02 Jul 2024 13:00), Max: 99.6 (01 Jul 2024 21:45)  HR: 96 (02 Jul 2024 13:00) (89 - 113)  BP: 165/74 (02 Jul 2024 13:00) (137/59 - 180/79)  BP(mean): 108 (01 Jul 2024 17:03) (82 - 108)  RR: 20 (02 Jul 2024 13:00) (20 - 24)  SpO2: 100% (02 Jul 2024 14:15) (95% - 100%)    Parameters below as of 02 Jul 2024 13:00  Patient On (Oxygen Delivery Method): nasal cannula  O2 Flow (L/min): 4      PHYSICAL EXAM:  Appearance: Elderly, appears weak  HEENT:  Atraumatic. Normocephalic  Neurologic: A & O x 3, no gross focal deficits.  Cardiovascular: RRR S1 S2, No murmur, no rubs/gallops. No JVD  Respiratory: +Stridor  Gastrointestinal:  Soft, Non-tender, + BS  Lower Extremities: 2+ Peripheral Pulses, No clubbing, cyanosis, or edema  Psychiatry: Patient is calm. No agitation.   Skin: warm and dry.  Consultant(s) Notes Reviewed:  [x ] YES  [ ] NO  Care Discussed with Consultants/Other Providers [ x] YES  [ ] NO    LABS:                        11.7   20.43 )-----------( 265      ( 02 Jul 2024 05:30 )             34.3     07-02    141  |  107  |  17.7  ----------------------------<  120<H>  3.6   |  19.0<L>  |  0.85    Ca    8.9      02 Jul 2024 05:30  Phos  3.9     07-02  Mg     1.9     07-02        Urinalysis Basic - ( 02 Jul 2024 05:30 )    Color: x / Appearance: x / SG: x / pH: x  Gluc: 120 mg/dL / Ketone: x  / Bili: x / Urobili: x   Blood: x / Protein: x / Nitrite: x   Leuk Esterase: x / RBC: x / WBC x   Sq Epi: x / Non Sq Epi: x / Bacteria: x      CAPILLARY BLOOD GLUCOSE      POCT Blood Glucose.: 118 mg/dL (02 Jul 2024 12:06)      ABG - ( 02 Jul 2024 01:15 )  pH, Arterial: 7.440 pH, Blood: x     /  pCO2: 28    /  pO2: 75    / HCO3: 19    / Base Excess: -5.2  /  SaO2: 96.7              Urinalysis Basic - ( 02 Jul 2024 05:30 )    Color: x / Appearance: x / SG: x / pH: x  Gluc: 120 mg/dL / Ketone: x  / Bili: x / Urobili: x   Blood: x / Protein: x / Nitrite: x   Leuk Esterase: x / RBC: x / WBC x   Sq Epi: x / Non Sq Epi: x / Bacteria: x        RADIOLOGY & ADDITIONAL TESTS:    Imaging Personally Reviewed:  [ ] YES  [ ] NO    HEALTH ISSUES - PROBLEM Dx:  CAD (coronary artery disease)    Elevated troponin    HTN (hypertension)    Acute bronchospasm    Acute hypoxemic respiratory failure    Stridor    Acute heart failure with preserved ejection fraction (HFpEF)    Hypertension         BIANCA IZQUIERDO  79y  Female      Patient is a 79y old  Female who presents with a chief complaint of IPH ?seizure (02 Jul 2024 13:32)      INTERVAL HPI/OVERNIGHT EVENTS:  79F with MHx significant for HTN, HLD and CAD s/p stent who takes ASA presented to [facility] this morning with AMS and urinary incontinence and her last known well approximately 530am. CTH at [place] revealed R parietal lobe hematoma. Patient was transferred to [facility] for further management. Patient received 3mg ativan, haldol and 1g keppra prior to arrival at [facility] ED for both agitation and potential seizures. Upon arrival concern over patient's airway as she was gargling with respirations, she was intubated for airway protection. Pt was extubated 22 hours later on 6/30. Pt since with intermittent difficulty breathing and audible wheezing noted to be worse overnight. Treated with Solumedrol 125 mg IVP followed by Solucortef 40 mg IVP Q 6h. Transferred to Hospitalist service from Stroke service on 7/2.    Patient seen and examined with son at bedside. S/p BIpap. Off for Lunch, audible stridor. No wheeze on lung exam. No resp distress but mildly anxious    REVIEW OF SYSTEMS:  +Dyspnea  +Anxiety      T(C): 36.9 (07-02-24 @ 13:00), Max: 37.6 (07-01-24 @ 21:45)  HR: 96 (07-02-24 @ 13:00) (89 - 113)  BP: 165/74 (07-02-24 @ 13:00) (137/59 - 180/79)  RR: 20 (07-02-24 @ 13:00) (20 - 24)  SpO2: 100% (07-02-24 @ 14:15) (95% - 100%)  Wt(kg): --Vital Signs Last 24 Hrs  T(C): 36.9 (02 Jul 2024 13:00), Max: 37.6 (01 Jul 2024 21:45)  T(F): 98.4 (02 Jul 2024 13:00), Max: 99.6 (01 Jul 2024 21:45)  HR: 96 (02 Jul 2024 13:00) (89 - 113)  BP: 165/74 (02 Jul 2024 13:00) (137/59 - 180/79)  BP(mean): 108 (01 Jul 2024 17:03) (82 - 108)  RR: 20 (02 Jul 2024 13:00) (20 - 24)  SpO2: 100% (02 Jul 2024 14:15) (95% - 100%)    Parameters below as of 02 Jul 2024 13:00  Patient On (Oxygen Delivery Method): nasal cannula  O2 Flow (L/min): 4      PHYSICAL EXAM:  Appearance: Elderly, appears weak  HEENT:  Atraumatic. Normocephalic  Neurologic: A & O x 3, no gross focal deficits.  Cardiovascular: RRR S1 S2, No murmur, no rubs/gallops. No JVD  Respiratory: +Stridor  Gastrointestinal:  Soft, Non-tender, + BS  Lower Extremities: 2+ Peripheral Pulses, No clubbing, cyanosis, or edema  Psychiatry: Patient is calm. No agitation.   Skin: warm and dry.  Consultant(s) Notes Reviewed:  [x ] YES  [ ] NO  Care Discussed with Consultants/Other Providers [ x] YES  [ ] NO    LABS:                        11.7   20.43 )-----------( 265      ( 02 Jul 2024 05:30 )             34.3     07-02    141  |  107  |  17.7  ----------------------------<  120<H>  3.6   |  19.0<L>  |  0.85    Ca    8.9      02 Jul 2024 05:30  Phos  3.9     07-02  Mg     1.9     07-02        Urinalysis Basic - ( 02 Jul 2024 05:30 )    Color: x / Appearance: x / SG: x / pH: x  Gluc: 120 mg/dL / Ketone: x  / Bili: x / Urobili: x   Blood: x / Protein: x / Nitrite: x   Leuk Esterase: x / RBC: x / WBC x   Sq Epi: x / Non Sq Epi: x / Bacteria: x      CAPILLARY BLOOD GLUCOSE      POCT Blood Glucose.: 118 mg/dL (02 Jul 2024 12:06)      ABG - ( 02 Jul 2024 01:15 )  pH, Arterial: 7.440 pH, Blood: x     /  pCO2: 28    /  pO2: 75    / HCO3: 19    / Base Excess: -5.2  /  SaO2: 96.7              Urinalysis Basic - ( 02 Jul 2024 05:30 )    Color: x / Appearance: x / SG: x / pH: x  Gluc: 120 mg/dL / Ketone: x  / Bili: x / Urobili: x   Blood: x / Protein: x / Nitrite: x   Leuk Esterase: x / RBC: x / WBC x   Sq Epi: x / Non Sq Epi: x / Bacteria: x        RADIOLOGY & ADDITIONAL TESTS:    Imaging Personally Reviewed:  [ ] YES  [ ] NO    HEALTH ISSUES - PROBLEM Dx:  CAD (coronary artery disease)    Elevated troponin    HTN (hypertension)    Acute bronchospasm    Acute hypoxemic respiratory failure    Stridor    Acute heart failure with preserved ejection fraction (HFpEF)    Hypertension

## 2024-07-03 LAB
ANION GAP SERPL CALC-SCNC: 13 MMOL/L — SIGNIFICANT CHANGE UP (ref 5–17)
AUTO DIFF PNL BLD: ABNORMAL
BASOPHILS # BLD AUTO: 0.01 K/UL — SIGNIFICANT CHANGE UP (ref 0–0.2)
BASOPHILS NFR BLD AUTO: 0.1 % — SIGNIFICANT CHANGE UP (ref 0–2)
BUN SERPL-MCNC: 20 MG/DL — SIGNIFICANT CHANGE UP (ref 8–20)
C-ANCA SER-ACNC: NEGATIVE — SIGNIFICANT CHANGE UP
CALCIUM SERPL-MCNC: 8.7 MG/DL — SIGNIFICANT CHANGE UP (ref 8.4–10.5)
CHLORIDE SERPL-SCNC: 104 MMOL/L — SIGNIFICANT CHANGE UP (ref 96–108)
CO2 SERPL-SCNC: 25 MMOL/L — SIGNIFICANT CHANGE UP (ref 22–29)
CREAT SERPL-MCNC: 0.78 MG/DL — SIGNIFICANT CHANGE UP (ref 0.5–1.3)
DRUG SCREEN, SERUM: SIGNIFICANT CHANGE UP
EGFR: 77 ML/MIN/1.73M2 — SIGNIFICANT CHANGE UP
EOSINOPHIL # BLD AUTO: 0 K/UL — SIGNIFICANT CHANGE UP (ref 0–0.5)
EOSINOPHIL NFR BLD AUTO: 0 % — SIGNIFICANT CHANGE UP (ref 0–6)
GLUCOSE BLDC GLUCOMTR-MCNC: 115 MG/DL — HIGH (ref 70–99)
GLUCOSE BLDC GLUCOMTR-MCNC: 121 MG/DL — HIGH (ref 70–99)
GLUCOSE BLDC GLUCOMTR-MCNC: 132 MG/DL — HIGH (ref 70–99)
GLUCOSE BLDC GLUCOMTR-MCNC: 163 MG/DL — HIGH (ref 70–99)
GLUCOSE SERPL-MCNC: 122 MG/DL — HIGH (ref 70–99)
HCT VFR BLD CALC: 36.5 % — SIGNIFICANT CHANGE UP (ref 34.5–45)
HGB BLD-MCNC: 12.1 G/DL — SIGNIFICANT CHANGE UP (ref 11.5–15.5)
IMM GRANULOCYTES NFR BLD AUTO: 1.2 % — HIGH (ref 0–0.9)
LYMPHOCYTES # BLD AUTO: 0.61 K/UL — LOW (ref 1–3.3)
LYMPHOCYTES # BLD AUTO: 3.7 % — LOW (ref 13–44)
MAGNESIUM SERPL-MCNC: 2.1 MG/DL — SIGNIFICANT CHANGE UP (ref 1.6–2.6)
MCHC RBC-ENTMCNC: 29.3 PG — SIGNIFICANT CHANGE UP (ref 27–34)
MCHC RBC-ENTMCNC: 33.2 GM/DL — SIGNIFICANT CHANGE UP (ref 32–36)
MCV RBC AUTO: 88.4 FL — SIGNIFICANT CHANGE UP (ref 80–100)
MONOCYTES # BLD AUTO: 0.44 K/UL — SIGNIFICANT CHANGE UP (ref 0–0.9)
MONOCYTES NFR BLD AUTO: 2.7 % — SIGNIFICANT CHANGE UP (ref 2–14)
MPO AB + PR3 PNL SER: SIGNIFICANT CHANGE UP
NEUTROPHILS # BLD AUTO: 15.07 K/UL — HIGH (ref 1.8–7.4)
NEUTROPHILS NFR BLD AUTO: 92.3 % — HIGH (ref 43–77)
NT-PROBNP SERPL-SCNC: 3840 PG/ML — HIGH (ref 0–300)
P-ANCA SER-ACNC: NEGATIVE — SIGNIFICANT CHANGE UP
PHOSPHATE SERPL-MCNC: 3.7 MG/DL — SIGNIFICANT CHANGE UP (ref 2.4–4.7)
PLATELET # BLD AUTO: 280 K/UL — SIGNIFICANT CHANGE UP (ref 150–400)
POTASSIUM SERPL-MCNC: 3 MMOL/L — LOW (ref 3.5–5.3)
POTASSIUM SERPL-SCNC: 3 MMOL/L — LOW (ref 3.5–5.3)
RBC # BLD: 4.13 M/UL — SIGNIFICANT CHANGE UP (ref 3.8–5.2)
RBC # FLD: 15 % — HIGH (ref 10.3–14.5)
SODIUM SERPL-SCNC: 142 MMOL/L — SIGNIFICANT CHANGE UP (ref 135–145)
WBC # BLD: 16.33 K/UL — HIGH (ref 3.8–10.5)
WBC # FLD AUTO: 16.33 K/UL — HIGH (ref 3.8–10.5)

## 2024-07-03 PROCEDURE — 99232 SBSQ HOSP IP/OBS MODERATE 35: CPT

## 2024-07-03 PROCEDURE — 99291 CRITICAL CARE FIRST HOUR: CPT

## 2024-07-03 PROCEDURE — 99233 SBSQ HOSP IP/OBS HIGH 50: CPT

## 2024-07-03 PROCEDURE — 99234 HOSP IP/OBS SM DT SF/LOW 45: CPT

## 2024-07-03 PROCEDURE — 70490 CT SOFT TISSUE NECK W/O DYE: CPT | Mod: 26

## 2024-07-03 RX ORDER — IPRATROPIUM BROMIDE AND ALBUTEROL SULFATE .5; 3 MG/3ML; MG/3ML
3 SOLUTION RESPIRATORY (INHALATION) EVERY 6 HOURS
Refills: 0 | Status: DISCONTINUED | OUTPATIENT
Start: 2024-07-03 | End: 2024-07-10

## 2024-07-03 RX ORDER — METHYLPREDNISOLONE ACETATE 20 MG/ML
40 VIAL (ML) INJECTION EVERY 8 HOURS
Refills: 0 | Status: DISCONTINUED | OUTPATIENT
Start: 2024-07-03 | End: 2024-07-05

## 2024-07-03 RX ORDER — AMLODIPINE BESYLATE 2.5 MG/1
5 TABLET ORAL DAILY
Refills: 0 | Status: DISCONTINUED | OUTPATIENT
Start: 2024-07-03 | End: 2024-07-04

## 2024-07-03 RX ORDER — POTASSIUM CHLORIDE 600 MG/1
40 TABLET, FILM COATED, EXTENDED RELEASE ORAL ONCE
Refills: 0 | Status: COMPLETED | OUTPATIENT
Start: 2024-07-03 | End: 2024-07-03

## 2024-07-03 RX ORDER — LEVETIRACETAM 100 MG/ML
1000 INJECTION INTRAVENOUS EVERY 12 HOURS
Refills: 0 | Status: DISCONTINUED | OUTPATIENT
Start: 2024-07-03 | End: 2024-07-11

## 2024-07-03 RX ORDER — METOPROLOL TARTRATE 50 MG
50 TABLET ORAL
Refills: 0 | Status: DISCONTINUED | OUTPATIENT
Start: 2024-07-03 | End: 2024-07-08

## 2024-07-03 RX ADMIN — IPRATROPIUM BROMIDE AND ALBUTEROL SULFATE 3 MILLILITER(S): .5; 3 SOLUTION RESPIRATORY (INHALATION) at 08:27

## 2024-07-03 RX ADMIN — Medication 50 MILLIGRAM(S): at 18:41

## 2024-07-03 RX ADMIN — HYDRALAZINE HYDROCHLORIDE 10 MILLIGRAM(S): 50 TABLET ORAL at 04:10

## 2024-07-03 RX ADMIN — LIDOCAINE HCL 1 PATCH: 28 GEL TOPICAL at 11:26

## 2024-07-03 RX ADMIN — Medication 40 MILLIGRAM(S): at 22:10

## 2024-07-03 RX ADMIN — LABETALOL HYDROCHLORIDE 10 MILLIGRAM(S): 300 TABLET ORAL at 06:04

## 2024-07-03 RX ADMIN — LEVETIRACETAM 1000 MILLIGRAM(S): 100 INJECTION INTRAVENOUS at 18:43

## 2024-07-03 RX ADMIN — POTASSIUM CHLORIDE 40 MILLIEQUIVALENT(S): 600 TABLET, FILM COATED, EXTENDED RELEASE ORAL at 15:28

## 2024-07-03 RX ADMIN — IPRATROPIUM BROMIDE AND ALBUTEROL SULFATE 3 MILLILITER(S): .5; 3 SOLUTION RESPIRATORY (INHALATION) at 15:26

## 2024-07-03 RX ADMIN — AMLODIPINE BESYLATE 5 MILLIGRAM(S): 2.5 TABLET ORAL at 12:33

## 2024-07-03 RX ADMIN — INSULIN LISPRO 1: 100 INJECTION, SOLUTION SUBCUTANEOUS at 18:42

## 2024-07-03 RX ADMIN — ENOXAPARIN SODIUM 40 MILLIGRAM(S): 100 INJECTION SUBCUTANEOUS at 22:10

## 2024-07-03 RX ADMIN — LABETALOL HYDROCHLORIDE 10 MILLIGRAM(S): 300 TABLET ORAL at 20:20

## 2024-07-03 RX ADMIN — Medication 10 MILLILITER(S): at 22:12

## 2024-07-03 RX ADMIN — Medication 40 MILLIGRAM(S): at 06:04

## 2024-07-03 RX ADMIN — FUROSEMIDE 40 MILLIGRAM(S): 10 INJECTION, SOLUTION INTRAMUSCULAR; INTRAVENOUS at 06:05

## 2024-07-03 RX ADMIN — LABETALOL HYDROCHLORIDE 10 MILLIGRAM(S): 300 TABLET ORAL at 02:20

## 2024-07-03 RX ADMIN — IPRATROPIUM BROMIDE AND ALBUTEROL SULFATE 3 MILLILITER(S): .5; 3 SOLUTION RESPIRATORY (INHALATION) at 04:11

## 2024-07-03 RX ADMIN — LEVETIRACETAM 1000 MILLIGRAM(S): 100 INJECTION INTRAVENOUS at 06:04

## 2024-07-03 RX ADMIN — BUDESONIDE 0.5 MILLIGRAM(S): 0.25 INHALANT ORAL at 21:09

## 2024-07-03 RX ADMIN — BUDESONIDE 0.5 MILLIGRAM(S): 0.25 INHALANT ORAL at 08:28

## 2024-07-03 RX ADMIN — HYDRALAZINE HYDROCHLORIDE 10 MILLIGRAM(S): 50 TABLET ORAL at 08:40

## 2024-07-03 RX ADMIN — LIDOCAINE HCL 1 PATCH: 28 GEL TOPICAL at 22:10

## 2024-07-03 RX ADMIN — Medication 40 MILLIGRAM(S): at 12:32

## 2024-07-03 RX ADMIN — SERTRALINE HYDROCHLORIDE 100 MILLIGRAM(S): 100 TABLET, FILM COATED ORAL at 12:33

## 2024-07-03 RX ADMIN — ATORVASTATIN CALCIUM 40 MILLIGRAM(S): 20 TABLET, FILM COATED ORAL at 22:10

## 2024-07-03 RX ADMIN — MONTELUKAST SODIUM 10 MILLIGRAM(S): 10 TABLET, FILM COATED ORAL at 12:33

## 2024-07-03 RX ADMIN — POLYETHYLENE GLYCOL 3350 17 GRAM(S): 1 POWDER ORAL at 12:33

## 2024-07-03 RX ADMIN — IPRATROPIUM BROMIDE AND ALBUTEROL SULFATE 3 MILLILITER(S): .5; 3 SOLUTION RESPIRATORY (INHALATION) at 20:54

## 2024-07-03 RX ADMIN — Medication 40 MILLIGRAM(S): at 00:57

## 2024-07-03 NOTE — PROGRESS NOTE ADULT - ASSESSMENT
Acute hypoxemic resp failure- appears resolved  Upper airway wheezing resolved, Sub glottic stenosis per ENT  CXR negative, BNP and Troponin decreasing, echo normal LV, on diuretics, cardiology following  Wean medrol -pred taper  change nebs q 6 hrs  ENT follow up, CT neck pending  Keep HOB elevated  speech input noted  Needs pulmonary follow up out pt for PFTs     Acute hypoxemic resp failure- appears resolved, good sp02 off 02  Upper airway wheezing resolved, Sub glottic stenosis per ENT  CXR negative, BNP and Troponin decreasing, echo normal LV, on diuretics, cardiology following  Wean medrol -pred taper  change nebs q 6 hrs  ENT follow up, CT neck pending  Keep HOB elevated  speech input noted  ABG without hypercapnia, can change NIV to prn, sleep eval as out pt  replace lytes , team called  Needs pulmonary follow up out pt for PFTs     Acute hypoxemic resp failure- appears resolved, good sp02 off 02  Upper airway wheezing resolved, Sub glottic stenosis per ENT  CXR negative, BNP and Troponin decreasing, echo normal LV, on diuretics, cardiology following  Wean medrol -pred taper  change nebs q 6 hrs  ENT follow up, CT neck pending, can d/c Heliox no wheeze or stridor currently  Keep HOB elevated  speech input noted  ABG without hypercapnia, can change NIV to prn, sleep eval as out pt  replace lytes , team called  Needs pulmonary follow up out pt for PFTs

## 2024-07-03 NOTE — PROGRESS NOTE ADULT - SUBJECTIVE AND OBJECTIVE BOX
Patient fatigued.  Needed BiPAP overnight.  Perseverative on wanting PO fluids.     FUNCTIONAL PROGRESS  7/1 PT  Bed Mobility: Supine to Sit:     · Level of [place]	moderate assist (50% patients effort)  · Physical Assist/Nonphysical Assist	1 person assist; nonverbal cues (demo/gestures); verbal cues    Bed Mobility Analysis:     · Impairments Contributing to Impaired Bed Mobility	impaired postural control; decreased strength; decreased flexibility; impaired balance    Transfer: Sit to Stand:     · Level of [place]	moderate assist (50% patients effort); + pushing to the right side  · Physical Assist/Nonphysical Assist	1 person + 1 person to manage equipment; verbal cues; nonverbal cues (demo/gestures)  · Assistive Device	hand held assist    Transfer: Stand to Sit:     · Level of [place]	moderate assist (50% patients effort)  · Physical Assist/Nonphysical Assist	nonverbal cues (demo/gestures); verbal cues; 1 person + 1 person to manage equipment  · Assistive Device	hand held assist    Sit/Stand Transfer Safety Analysis:     · Impairments Contributing to Impaired Transfers	impaired balance; impaired coordination; impaired postural control; decreased strength    Gait Skills:     · Level of [place]	moderate assist (50% patients effort); mild lean/pushing to the right  · Physical Assist/Nonphysical Assist	nonverbal cues (demo/gestures); verbal cues; 1 person + 1 person to manage equipment  · Assistive Device	hand held assist  · Gait Distance	bed to chair; 5 feet    Gait Analysis:     · Gait Pattern Used	3-point gait  · Gait Deviations Noted	decreased shin; decreased step length; decreased stride length  · Impairments Contributing to Gait Deviations	impaired balance; impaired postural control; decreased strength; impaired motor control; impaired coordination    7/1 OT  Bathing Training:     · Level of [place]	maximum assist (25% patients effort)    Upper Body Dressing Training:     · Level of [place]	moderate assist (50% patients effort)    Lower Body Dressing Training:     · Level of [place]	maximum assist (25% patients effort)    Toilet Hygiene Training:     · Level of [place]	to assess    Grooming Training:     · Level of [place]	moderate assist (50% patients effort)      VITALS  T(C): 37 (07-03-24 @ 08:00), Max: 37 (07-02-24 @ 18:28)  HR: 87 (07-03-24 @ 08:39) (82 - 98)  BP: 177/80 (07-03-24 @ 08:00) (154/69 - 193/101)  RR: 20 (07-03-24 @ 08:00) (18 - 21)  SpO2: 99% (07-03-24 @ 08:39) (98% - 100%)  Wt(kg): --    MEDICATIONS   acetaminophen     Tablet .. 650 milliGRAM(s) every 6 hours PRN  albuterol/ipratropium for Nebulization 3 milliLiter(s) every 4 hours  amLODIPine   Tablet 5 milliGRAM(s) daily  atorvastatin 40 milliGRAM(s) at bedtime  buDESOnide    Inhalation Suspension 0.5 milliGRAM(s) every 12 hours  dextrose 10% Bolus 125 milliLiter(s) once  dextrose 5%. 1000 milliLiter(s) <Continuous>  dextrose 5%. 1000 milliLiter(s) <Continuous>  dextrose 50% Injectable 25 Gram(s) once  dextrose 50% Injectable 12.5 Gram(s) once  dextrose Oral Gel 15 Gram(s) once PRN  enoxaparin Injectable 40 milliGRAM(s) every 24 hours  furosemide   Injectable 40 milliGRAM(s) daily  glucagon  Injectable 1 milliGRAM(s) once  hydrALAZINE Injectable 10 milliGRAM(s) every 2 hours PRN  insulin lispro (ADMELOG) corrective regimen sliding scale   at bedtime  insulin lispro (ADMELOG) corrective regimen sliding scale   three times a day before meals  labetalol Injectable 10 milliGRAM(s) every 2 hours PRN  levalbuterol Inhalation 0.63 milliGRAM(s) every 6 hours PRN  levETIRAcetam   Injectable 1000 milliGRAM(s) every 12 hours  lidocaine   4% Patch 1 Patch every 24 hours  losartan 100 milliGRAM(s) daily  methylPREDNISolone sodium succinate Injectable 40 milliGRAM(s) every 6 hours  metoprolol tartrate 50 milliGRAM(s) two times a day  montelukast 10 milliGRAM(s) daily  pantoprazole    Tablet 40 milliGRAM(s) before breakfast  polyethylene glycol 3350 17 Gram(s) daily  senna Syrup 10 milliLiter(s) at bedtime  sertraline 100 milliGRAM(s) daily      RECENT LABS/IMAGING  - Reviewed Today                        12.1   16.33 )-----------( 280      ( 03 Jul 2024 05:40 )             36.5     07-03    142  |  104  |  20.0  ----------------------------<  122<H>  3.0<L>   |  25.0  |  0.78    Ca    8.7      03 Jul 2024 05:40  Phos  3.7     07-03  Mg     2.1     07-03        Urinalysis Basic - ( 03 Jul 2024 05:40 )    Color: x / Appearance: x / SG: x / pH: x  Gluc: 122 mg/dL / Ketone: x  / Bili: x / Urobili: x   Blood: x / Protein: x / Nitrite: x   Leuk Esterase: x / RBC: x / WBC x   Sq Epi: x / Non Sq Epi: x / Bacteria: x            HEAD CT, CTA HEAD & NECK 6/29 - Age-appropriate involutional and ischemic gliotic changes. No change in right parietal parenchymal hemorrhage since 7:42 AM. Normal CTA of the head and neck.    HEAD CT 6/30 - No change since 6/29/2024. Right small right parietal cortical parenchymal hemorrhage.    CXR 7/2 - Clear lungs on the latest film.  ----------------------------------------------------------------------------------------  PHYSICAL EXAM  Constitutional - NAD, Uncomfortable   Extremities - No calf tenderness   Neurologic Exam -                    Cognitive - AAO to self, place, date, year, situation     Communication - Fluent, Mild dysarthria     Cranial Nerves - CN 2-12 intact     FUNCTIONAL MOTOR EXAM - Mild deficits of the left UE      Sensory - Intact to LT   Psychiatric - Fatigued  ----------------------------------------------------------------------------------------  ASSESSMENT/PLAN  79yFemale with functional deficits after developing an IPH  Right parietal IPH - Stable, Keppra  Acute CHF/Uncontrolled HTN - Cozaar, Lopressor, Recommend taper Hydralazine IV & Labetalol IV  CAD - Lipitor   Pulm - Duoneb, Pulmicort, Lasix, Continue Solumedrol IV, Xopenex  Mood - Zoloft  Pain - Tylenol  DVT PPX - SCDs, Lovenox  Rehab/Impaired mobility and function - Patient continues to require hospitalization for the above diagnoses and ongoing active management of comorbid complications (patient with acute onset CHF with SBP in 190s, also pending brain MRI) that are substantially posing a threat to bodily function, functional ability and quality of life.     RECOMMEND - OOB daily     When medically optimized, based on the patient's diagnosis, current functional status and potential for progress, recommend ACUTE inpatient rehabilitation for the functional deficits consisting of 3 hours of therapy/day & 24 hour RN/daily PMR physician for active comorbid medical management. Patient will be able to tolerate 3 hours a day.     Will continue to follow. Rehab recommendations are dependent on how functional progress changes as well as how patient continues to participate and tolerate therapeutic interventions, WHICH MAY CHANGE UPON FOLLOW UP EXAMINATIONS. Recommend ongoing mobilization by staff to maintain cardiopulmonary function and prevention of secondary complications related to debility/immobility. Discussed the specific management and recommendations above with rehab clinical care team/rehab liaison.

## 2024-07-03 NOTE — PROGRESS NOTE ADULT - ASSESSMENT
A/P: 80 y/o with hx of HTN, HLD, CAD s/p stent in 2000 (family unsure as to what type of stent it is), takes ASA and Crestor presented to [facility] with AMS and urinary incontinence where LKW was 5:30 AM prior to arrival. CTH in [place] revealed R parietal lobe hematoma. Pt transferred to [facility] for further management. Once arrival in [facility] ED, patient was gargling with respirations, so pt was intubated for airway protection. Cardiology consulted for elevated troponin.

## 2024-07-03 NOTE — PROGRESS NOTE ADULT - ASSESSMENT
ASSESSMENT:  78 y/o Female with PMHx significant for HTN, HLD and CAD s/p stent who takes ASA presented to [facility] the morning of 6/29/24 with AMS and urinary incontinence and her last known well approximately 530am. CTH at [facility] revealed Right parietal lobe hematoma with surrounding vasogenic edema. Patient was transferred to [facility] for further management. Of note, patient recieved 3mg ativan, haldol and 1g keppra prior to arrival at [facility] ED for both agitation and potential seizures. Once in ED, patient was intubated for airway protection.     Patient extubated 6/30/24.  7/2/24 patient with worsening respiratory distress and bronchospasm, made NPO, on continuous BiPAP, ?CHF exacerbation 2/2 HFpEF? ENT scope with evidence of possible subglottic stenosis Pulm consulted, MICU consulted -- upgraded to Stepdown w/ VS q 2 hours and stroke neuro checks q 2 hours. Patient transferred to Medicine service under Hospitalist Dr. Gege Torre.    Patient now off BiPAP, tolerating NC, ongoing assessment by ENT and Pulm.    Etiology of hemorrhage possibly amyloid angiopathy, pending MRI for further evaluation.  Hypertension likely 2/2 seizure, patient arrived from [facility] on Card[facility] gtt.  Per initial triage note at [facility], at home patient became unresponsive, foaming at mouth, clenching jaw, snoring respirations with urinary incontinence and tongue biting.  Likely new-onset seizures in the setting of ICH.     NEURO:   #Right parietal lobe hematoma with surrounding vasogenic edema   #New onset seizures  -Neurologically patient stable   -Continue close monitoring for neurological deterioration  -Stroke neuro checks q 2 hours  -Pending MR Head w/wo, assess for underlying contributory lesion    -SBP Goal 100-160 mmHg, avoiding rapid fluctuations and hypotension  -ANTITHROMBOTIC THERAPY: On hold, ICH -- patient on ASA at home (hx CAD), pending timeline for reinitiation after MR  -STATIN THERAPY: On hold due to NPO status -- can resume Atorvastatin 40mg PO daily when clinically appropriate (home med therapeutic interchange)  -AED: Keppra 1000mg PO BID   -Seizure precautions  -Dysphagia Screening: PASS  -LDL 47, titrate statin to LDL goal less than 70  -A1C 5.8  -Physical therapy/OT/Speech eval/treatment.   - -->7067   -TTE as noted: EF 60-65%  -Troponin downtrending, Trop I 308.6 --> Trop T 55, ?demand ischemia  -DVT ppx: Lovenox  -Stroke education    OTHER: Condition and plan of care d/w patient and family at bedside, questions and concerns addressed.     DISPOSITION: Rehab or home depending on PT eval once stable and workup is complete    CORE MEASURES:        Admission NIHSS: 2     Tenecteplase : [] YES [x] NO      LDL/A1C: 47/5.8     Depression Screen- if depression hx and/or present      Statin Therapy: Atorvastatin 40mg     Dysphagia Screen: [x] PASS [] FAIL     Smoking [] YES [x] NO      Afib [] YES [x] NO     Stroke Education [x] YES [] NO ASSESSMENT:  80 y/o Female with PMHx significant for HTN, HLD and CAD s/p stent who takes ASA presented to [facility] the morning of 6/29/24 with AMS and urinary incontinence and her last known well approximately 530am. CTH at [place] revealed Right parietal lobe hematoma with surrounding vasogenic edema. Patient was transferred to [facility] for further management. Of note, patient recieved 3mg ativan, haldol and 1g keppra prior to arrival at [facility] ED for both agitation and potential seizures. Once in ED, patient was intubated for airway protection.     Patient extubated 6/30/24.  7/2/24 patient with worsening respiratory distress and bronchospasm, made NPO, on continuous BiPAP, ?CHF exacerbation 2/2 HFpEF? ENT scope with evidence of possible subglottic stenosis Pulm consulted, MICU consulted -- upgraded to Stepdown w/ VS q 2 hours and stroke neuro checks q 2 hours. Patient transferred to Medicine service under Hospitalist Dr. Gege Torre.    Patient now off BiPAP, tolerating NC, ongoing assessment by ENT and Pulm.    Etiology of hemorrhage possibly amyloid angiopathy, pending MRI for further evaluation.  Hypertension likely 2/2 seizure, patient arrived from [place] on Cardene gtt.  Per initial triage note at [place], at home patient became unresponsive, foaming at mouth, clenching jaw, snoring respirations with urinary incontinence and tongue biting.  Likely new-onset seizures in the setting of ICH.     NEURO:   #Right parietal lobe hematoma with surrounding vasogenic edema   #New onset seizures  -Neurologically patient stable   -Continue close monitoring for neurological deterioration  -May downgrade to q4 stroke neuro checks  -Pending MR Head w/wo, assess for underlying contributory lesion    -SBP Goal 100-160 mmHg, avoiding rapid fluctuations and hypotension  -ANTITHROMBOTIC THERAPY: On hold, ICH -- patient on ASA at home (hx CAD), pending timeline for reinitiation after MR  -STATIN THERAPY: On hold due to NPO status -- can resume Atorvastatin 40mg PO daily when clinically appropriate (home med therapeutic interchange)  -AED: Keppra 1000mg PO BID   -Seizure precautions  -Dysphagia Screening: PASS  -LDL 47, titrate statin to LDL goal less than 70  -A1C 5.8  -Physical therapy/OT/Speech eval/treatment.   - -->7067   -TTE as noted: EF 60-65%  -Troponin downtrending, Trop I 308.6 --> Trop T 55, ?demand ischemia  -DVT ppx: Lovenox  -Stroke education    OTHER: Condition and plan of care d/w patient and family at bedside, questions and concerns addressed.     DISPOSITION: Rehab or home depending on PT eval once stable and workup is complete    CORE MEASURES:        Admission NIHSS: 2     Tenecteplase : [] YES [x] NO      LDL/A1C: 47/5.8     Depression Screen- if depression hx and/or present      Statin Therapy: Atorvastatin 40mg     Dysphagia Screen: [x] PASS [] FAIL     Smoking [] YES [x] NO      Afib [] YES [x] NO     Stroke Education [x] YES [] NO

## 2024-07-03 NOTE — PROGRESS NOTE ADULT - SUBJECTIVE AND OBJECTIVE BOX
Preliminary note, official recommendations pending attending review/signature   [facility] Stroke Team  Progress Note     HPI:  78 y/o Female with PMHx significant for HTN, HLD and CAD s/p stent who takes ASA presented to [facility] the morning of 6/29/24 with AMS and urinary incontinence and her last known well approximately 530am. CTH at [place] revealed R parietal lobe hematoma. Patient was transferred to [facility] for further management. Of note, patient recieved 3mg ativan, haldol and 1g keppra prior to arrival at [facility] ED for both agitation and potential seizures. Once in ED, ED providers concerned for patient's airway as she was gargling with respirations. Decision made by ED staff to intubate patient for airway protection. ROS unable to be obtained as patient was in distress altered on exam.   GCS: 12 (E3, V4, M5) prior to intubation, ICH: 1, mRS: 0    SUBJECTIVE: Patient seen and examined at bedside. Family at bedside. No overnight events. No new neurologic complaints. Patient transferred to Medicine service yesterday for ongoing w/u and care. Patient sitting up in bed, on supplemental O2 via NC, tolerating well. Appears in NAD. ROS reported negative unless otherwise noted.    MEDICATIONS:  acetaminophen     Tablet .. 650 milliGRAM(s) Oral every 6 hours PRN  albuterol/ipratropium for Nebulization 3 milliLiter(s) Nebulizer every 4 hours  amLODIPine   Tablet 5 milliGRAM(s) Oral daily  atorvastatin 40 milliGRAM(s) Oral at bedtime  buDESOnide    Inhalation Suspension 0.5 milliGRAM(s) Inhalation every 12 hours  dextrose 10% Bolus 125 milliLiter(s) IV Bolus once  dextrose 5%. 1000 milliLiter(s) IV Continuous <Continuous>  dextrose 5%. 1000 milliLiter(s) IV Continuous <Continuous>  dextrose 50% Injectable 12.5 Gram(s) IV Push once  dextrose 50% Injectable 25 Gram(s) IV Push once  dextrose Oral Gel 15 Gram(s) Oral once PRN  enoxaparin Injectable 40 milliGRAM(s) SubCutaneous every 24 hours  furosemide   Injectable 40 milliGRAM(s) IV Push daily  glucagon  Injectable 1 milliGRAM(s) IntraMuscular once  hydrALAZINE Injectable 10 milliGRAM(s) IV Push every 2 hours PRN  insulin lispro (ADMELOG) corrective regimen sliding scale   SubCutaneous at bedtime  insulin lispro (ADMELOG) corrective regimen sliding scale   SubCutaneous three times a day before meals  labetalol Injectable 10 milliGRAM(s) IV Push every 2 hours PRN  levalbuterol Inhalation 0.63 milliGRAM(s) Inhalation every 6 hours PRN  levETIRAcetam   Injectable 1000 milliGRAM(s) IV Push every 12 hours  lidocaine   4% Patch 1 Patch Transdermal every 24 hours  losartan 100 milliGRAM(s) Oral daily  methylPREDNISolone sodium succinate Injectable 40 milliGRAM(s) IV Push every 6 hours  metoprolol tartrate 50 milliGRAM(s) Oral two times a day  montelukast 10 milliGRAM(s) Oral daily  pantoprazole    Tablet 40 milliGRAM(s) Oral before breakfast  polyethylene glycol 3350 17 Gram(s) Oral daily  senna Syrup 10 milliLiter(s) Oral at bedtime  sertraline 100 milliGRAM(s) Oral daily      Vital Signs Last 24 Hrs  T(C): 36.3 (03 Jul 2024 12:00), Max: 37 (02 Jul 2024 18:28)  T(F): 97.4 (03 Jul 2024 12:00), Max: 98.6 (02 Jul 2024 18:28)  HR: 102 (03 Jul 2024 12:00) (82 - 102)  BP: 183/97 (03 Jul 2024 12:00) (138/98 - 193/101)  BP(mean): 124 (03 Jul 2024 12:00) (91 - 126)  RR: 20 (03 Jul 2024 12:00) (18 - 21)  SpO2: 95% (03 Jul 2024 12:00) (95% - 100%)    Parameters below as of 03 Jul 2024 12:00  Patient On (Oxygen Delivery Method): nasal cannula  O2 Flow (L/min): 3      PHYSICAL EXAM:  General: No acute distress, tolerating NC. Mild bruising to LUE.    NEUROLOGICAL EXAM:  Mental status: Awake and alert, normal attention span. Oriented x 4 to self, place, time, situation. Speech fluent, follows commands, no neglect, normal memory   Cranial Nerves: No facial asymmetry, no nystagmus, no dysarthria,  tongue midline  Motor exam: Normal tone, no drift,  4/5 RUE, 5/5 RLE,   4/5 LUE (likely pain limited), 5/5 LLE  Impaired fine finger movements on Left.  Sensation: Intact to light touch   Coordination/ Gait: No dysmetria, gait not tested        LABS:                        12.1   16.33 )-----------( 280      ( 03 Jul 2024 05:40 )             36.5    07-03    142  |  104  |  20.0  ----------------------------<  122<H>  3.0<L>   |  25.0  |  0.78    Ca    8.7      03 Jul 2024 05:40  Phos  3.7     07-03  Mg     2.1     07-03      LDL 47  A1C 5.8    IMAGING:     CT Head No Cont (06.30.24 @ 09:41)  IMPRESSION: No change since 6/29/2024. Right small right parietal   cortical parenchymal hemorrhage.    CT Angio Head w/ IV Cont (06.29.24 @ 13:48)  Impression: Age-appropriate involutional and ischemic gliotic changes. No   change in right parietal parenchymal hemorrhage since 7:42 AM. Normal CTA   of the head and neck.    CT Head No Cont (06.29.24 @ 07:48)  IMPRESSION:  1. 1.7 cm acute juxtacortical hematoma with surrounding vasogenic edema,   right parietal lobe. Recommend follow-up imaging, as underlying lesion   cannot be excluded.  2. Moderate-severe white matter hypodensities are nonspecific however   statistically reflects chronic microvascular ischemic change.  3. Additional findings described in detail above.    TTE W or WO Ultrasound Enhancing Agent (06.30.24 @ 08:50)  CONCLUSIONS:   1. Left ventricular cavity is normal in size. Left ventricular systolic function is normal with an ejection fraction visually estimated at 60 to 65 %.   2. There is mild (grade 1) left ventricular diastolic dysfunction.   3. Normal right ventricular cavity size and normal right ventricular systolic function.   4. The left atrium is normal in size.   5. The right atrium is normal in size.   6. Structurally normal mitral valve with normal leaflet excursion.   7. No pericardial effusion seen.   8. Mild tricuspid regurgitation.   9. Estimated pulmonary artery systolic pressure is 38 mmHg.  10. Mild left ventricular hypertrophy.  11. Technically difficult image quality.  12. Trileaflet aortic valve with normal systolic excursion. Fibrocalcific aortic valve sclerosis without stenosis.     [facility] Stroke Team  Progress Note     HPI:  78 y/o Female with PMHx significant for HTN, HLD and CAD s/p stent who takes ASA presented to [facility] the morning of 6/29/24 with AMS and urinary incontinence and her last known well approximately 530am. CTH at [place] revealed R parietal lobe hematoma. Patient was transferred to [facility] for further management. Of note, patient recieved 3mg ativan, haldol and 1g keppra prior to arrival at [facility] ED for both agitation and potential seizures. Once in ED, ED providers concerned for patient's airway as she was gargling with respirations. Decision made by ED staff to intubate patient for airway protection. ROS unable to be obtained as patient was in distress altered on exam.   GCS: 12 (E3, V4, M5) prior to intubation, ICH: 1, mRS: 0    SUBJECTIVE: Patient seen and examined at bedside. Family at bedside. No overnight events. No new neurologic complaints. Patient transferred to Medicine service yesterday for ongoing w/u and care. Patient sitting up in bed, on supplemental O2 via NC, tolerating well. Appears in NAD. ROS reported negative unless otherwise noted.    MEDICATIONS:  acetaminophen     Tablet .. 650 milliGRAM(s) Oral every 6 hours PRN  albuterol/ipratropium for Nebulization 3 milliLiter(s) Nebulizer every 4 hours  amLODIPine   Tablet 5 milliGRAM(s) Oral daily  atorvastatin 40 milliGRAM(s) Oral at bedtime  buDESOnide    Inhalation Suspension 0.5 milliGRAM(s) Inhalation every 12 hours  dextrose 10% Bolus 125 milliLiter(s) IV Bolus once  dextrose 5%. 1000 milliLiter(s) IV Continuous <Continuous>  dextrose 5%. 1000 milliLiter(s) IV Continuous <Continuous>  dextrose 50% Injectable 12.5 Gram(s) IV Push once  dextrose 50% Injectable 25 Gram(s) IV Push once  dextrose Oral Gel 15 Gram(s) Oral once PRN  enoxaparin Injectable 40 milliGRAM(s) SubCutaneous every 24 hours  furosemide   Injectable 40 milliGRAM(s) IV Push daily  glucagon  Injectable 1 milliGRAM(s) IntraMuscular once  hydrALAZINE Injectable 10 milliGRAM(s) IV Push every 2 hours PRN  insulin lispro (ADMELOG) corrective regimen sliding scale   SubCutaneous at bedtime  insulin lispro (ADMELOG) corrective regimen sliding scale   SubCutaneous three times a day before meals  labetalol Injectable 10 milliGRAM(s) IV Push every 2 hours PRN  levalbuterol Inhalation 0.63 milliGRAM(s) Inhalation every 6 hours PRN  levETIRAcetam   Injectable 1000 milliGRAM(s) IV Push every 12 hours  lidocaine   4% Patch 1 Patch Transdermal every 24 hours  losartan 100 milliGRAM(s) Oral daily  methylPREDNISolone sodium succinate Injectable 40 milliGRAM(s) IV Push every 6 hours  metoprolol tartrate 50 milliGRAM(s) Oral two times a day  montelukast 10 milliGRAM(s) Oral daily  pantoprazole    Tablet 40 milliGRAM(s) Oral before breakfast  polyethylene glycol 3350 17 Gram(s) Oral daily  senna Syrup 10 milliLiter(s) Oral at bedtime  sertraline 100 milliGRAM(s) Oral daily      Vital Signs Last 24 Hrs  T(C): 36.3 (03 Jul 2024 12:00), Max: 37 (02 Jul 2024 18:28)  T(F): 97.4 (03 Jul 2024 12:00), Max: 98.6 (02 Jul 2024 18:28)  HR: 102 (03 Jul 2024 12:00) (82 - 102)  BP: 183/97 (03 Jul 2024 12:00) (138/98 - 193/101)  BP(mean): 124 (03 Jul 2024 12:00) (91 - 126)  RR: 20 (03 Jul 2024 12:00) (18 - 21)  SpO2: 95% (03 Jul 2024 12:00) (95% - 100%)    Parameters below as of 03 Jul 2024 12:00  Patient On (Oxygen Delivery Method): nasal cannula  O2 Flow (L/min): 3      PHYSICAL EXAM:  General: No acute distress, tolerating NC. Mild bruising to LUE.    NEUROLOGICAL EXAM:  Mental status: Awake and alert, normal attention span. Oriented x 4 to self, place, time, situation. Speech fluent, follows commands, no neglect, normal memory   Cranial Nerves: No facial asymmetry, no nystagmus, no dysarthria,  tongue midline  Motor exam: Normal tone, no drift,  4/5 RUE, 5/5 RLE,   4/5 LUE (likely pain limited), 5/5 LLE  Impaired fine finger movements on Left.  Sensation: Intact to light touch   Coordination/ Gait: No dysmetria, gait not tested        LABS:                        12.1   16.33 )-----------( 280      ( 03 Jul 2024 05:40 )             36.5    07-03    142  |  104  |  20.0  ----------------------------<  122<H>  3.0<L>   |  25.0  |  0.78    Ca    8.7      03 Jul 2024 05:40  Phos  3.7     07-03  Mg     2.1     07-03      LDL 47  A1C 5.8    IMAGING:     CT Head No Cont (06.30.24 @ 09:41)  IMPRESSION: No change since 6/29/2024. Right small right parietal   cortical parenchymal hemorrhage.    CT Angio Head w/ IV Cont (06.29.24 @ 13:48)  Impression: Age-appropriate involutional and ischemic gliotic changes. No   change in right parietal parenchymal hemorrhage since 7:42 AM. Normal CTA   of the head and neck.    CT Head No Cont (06.29.24 @ 07:48)  IMPRESSION:  1. 1.7 cm acute juxtacortical hematoma with surrounding vasogenic edema,   right parietal lobe. Recommend follow-up imaging, as underlying lesion   cannot be excluded.  2. Moderate-severe white matter hypodensities are nonspecific however   statistically reflects chronic microvascular ischemic change.  3. Additional findings described in detail above.    TTE W or WO Ultrasound Enhancing Agent (06.30.24 @ 08:50)  CONCLUSIONS:   1. Left ventricular cavity is normal in size. Left ventricular systolic function is normal with an ejection fraction visually estimated at 60 to 65 %.   2. There is mild (grade 1) left ventricular diastolic dysfunction.   3. Normal right ventricular cavity size and normal right ventricular systolic function.   4. The left atrium is normal in size.   5. The right atrium is normal in size.   6. Structurally normal mitral valve with normal leaflet excursion.   7. No pericardial effusion seen.   8. Mild tricuspid regurgitation.   9. Estimated pulmonary artery systolic pressure is 38 mmHg.  10. Mild left ventricular hypertrophy.  11. Technically difficult image quality.  12. Trileaflet aortic valve with normal systolic excursion. Fibrocalcific aortic valve sclerosis without stenosis.

## 2024-07-03 NOTE — PROGRESS NOTE ADULT - SUBJECTIVE AND OBJECTIVE BOX
Patient is a 79y old  Female who presents with a chief complaint of IPH ?seizure (03 Jul 2024 13:19)    INTERVAL HPI/OVERNIGHT EVENTS: No acute events overnight. BP elevated to 180s/90s and patient transitioned from Bipap to NC.     MEDICATIONS  (STANDING):  albuterol/ipratropium for Nebulization 3 milliLiter(s) Nebulizer every 6 hours  amLODIPine   Tablet 5 milliGRAM(s) Oral daily  atorvastatin 40 milliGRAM(s) Oral at bedtime  buDESOnide    Inhalation Suspension 0.5 milliGRAM(s) Inhalation every 12 hours  dextrose 10% Bolus 125 milliLiter(s) IV Bolus once  dextrose 5%. 1000 milliLiter(s) (50 mL/Hr) IV Continuous <Continuous>  dextrose 5%. 1000 milliLiter(s) (100 mL/Hr) IV Continuous <Continuous>  dextrose 50% Injectable 12.5 Gram(s) IV Push once  dextrose 50% Injectable 25 Gram(s) IV Push once  enoxaparin Injectable 40 milliGRAM(s) SubCutaneous every 24 hours  furosemide   Injectable 40 milliGRAM(s) IV Push daily  glucagon  Injectable 1 milliGRAM(s) IntraMuscular once  insulin lispro (ADMELOG) corrective regimen sliding scale   SubCutaneous at bedtime  insulin lispro (ADMELOG) corrective regimen sliding scale   SubCutaneous three times a day before meals  levETIRAcetam   Injectable 1000 milliGRAM(s) IV Push every 12 hours  lidocaine   4% Patch 1 Patch Transdermal every 24 hours  losartan 100 milliGRAM(s) Oral daily  methylPREDNISolone sodium succinate Injectable 40 milliGRAM(s) IV Push every 8 hours  metoprolol tartrate 50 milliGRAM(s) Oral two times a day  montelukast 10 milliGRAM(s) Oral daily  pantoprazole    Tablet 40 milliGRAM(s) Oral before breakfast  polyethylene glycol 3350 17 Gram(s) Oral daily  potassium chloride    Tablet ER 40 milliEquivalent(s) Oral once  senna Syrup 10 milliLiter(s) Oral at bedtime  sertraline 100 milliGRAM(s) Oral daily    MEDICATIONS  (PRN):  acetaminophen     Tablet .. 650 milliGRAM(s) Oral every 6 hours PRN Temp greater or equal to 38C (100.4F), Mild Pain (1 - 3)  dextrose Oral Gel 15 Gram(s) Oral once PRN Blood Glucose LESS THAN 70 milliGRAM(s)/deciliter  hydrALAZINE Injectable 10 milliGRAM(s) IV Push every 2 hours PRN Sbp >160  labetalol Injectable 10 milliGRAM(s) IV Push every 2 hours PRN SBP>160  levalbuterol Inhalation 0.63 milliGRAM(s) Inhalation every 6 hours PRN SOB/wheezing      Allergies    penicillins (Unknown)    Intolerances        REVIEW OF SYSTEMS: all negative with exception of above    Vital Signs Last 24 Hrs  T(C): 36.3 (03 Jul 2024 12:00), Max: 37 (02 Jul 2024 18:28)  T(F): 97.4 (03 Jul 2024 12:00), Max: 98.6 (02 Jul 2024 18:28)  HR: 102 (03 Jul 2024 12:00) (82 - 102)  BP: 183/97 (03 Jul 2024 12:00) (138/98 - 193/101)  BP(mean): 124 (03 Jul 2024 12:00) (91 - 126)  RR: 20 (03 Jul 2024 12:00) (18 - 21)  SpO2: 95% (03 Jul 2024 12:00) (95% - 100%)    Parameters below as of 03 Jul 2024 12:00  Patient On (Oxygen Delivery Method): nasal cannula  O2 Flow (L/min): 3      PHYSICAL EXAM:  GENERAL: NAD, well-groomed  NERVOUS SYSTEM:  Alert & Oriented X3, Good concentration; Motor Strength 5/5 B/L upper and lower extremities; DTRs 2+ intact and symmetric  CHEST/LUNG: Clear to percussion bilaterally; No rales, rhonchi, wheezing, or rubs  HEART: Regular rate and rhythm; No murmurs, rubs, or gallops  ABDOMEN: Soft, Nontender, Nondistended; Bowel sounds present  EXTREMITIES:  2+ Peripheral Pulses, No clubbing, cyanosis, or edema    LABS:                        12.1   16.33 )-----------( 280      ( 03 Jul 2024 05:40 )             36.5     07-03    142  |  104  |  20.0  ----------------------------<  122<H>  3.0<L>   |  25.0  |  0.78    Ca    8.7      03 Jul 2024 05:40  Phos  3.7     07-03  Mg     2.1     07-03        Urinalysis Basic - ( 03 Jul 2024 05:40 )    Color: x / Appearance: x / SG: x / pH: x  Gluc: 122 mg/dL / Ketone: x  / Bili: x / Urobili: x   Blood: x / Protein: x / Nitrite: x   Leuk Esterase: x / RBC: x / WBC x   Sq Epi: x / Non Sq Epi: x / Bacteria: x      CAPILLARY BLOOD GLUCOSE      POCT Blood Glucose.: 115 mg/dL (03 Jul 2024 05:55)  POCT Blood Glucose.: 121 mg/dL (03 Jul 2024 00:39)  POCT Blood Glucose.: 131 mg/dL (02 Jul 2024 17:30)  POCT Blood Glucose.: 137 mg/dL (02 Jul 2024 16:49)      RADIOLOGY & ADDITIONAL TESTS:    Imaging Personally Reviewed:  [ ] YES  [ ] NO    Consultant(s) Notes Reviewed:  [ ] YES  [ ] NO    Care Discussed with Consultants/Other Providers [ ] YES  [ ] NO

## 2024-07-03 NOTE — SWALLOW BEDSIDE ASSESSMENT ADULT - SLP PERTINENT HISTORY OF CURRENT PROBLEM
Per H&P: "79F with MHx significant for HTN, HLD and CAD s/p stent who takes ASA presented to [facility] this morning with AMS and urinary incontinence and her last known well approximately 530am. CTH at [place] revealed R parietal lobe hematoma. Patient was transferred to [facility] for further management. Of note, patient recieved 3mg ativan, haldol and 1g keppra prior to arrival at [facility] ED for both agitation and potential seizures. Once in ED, ED providers concerned for patient's airway as she was gargling with respirations. Decision made by ED staff to intubate patient for airway protection. ROS unable to be obtained as patient was in distress altered on exam. "

## 2024-07-03 NOTE — PROGRESS NOTE ADULT - ASSESSMENT
79F with MHx significant for HTN, HLD and CAD s/p stent who takes ASA presented to [facility] this morning with AMS and urinary incontinence and her last known well approximately 530am. CTH at [place] revealed R parietal lobe hematoma    #R Parietal lobe hematoma surrounding vasogenic edema  #New onset seizures  Transferred from [facility]  Pending MRI Head w/wo  Hold AC/AP (on ASA pta for CAD)  Atorvastatin when able to tolerate PO  KEPPRA 1000mg BID  Seizure precautions  Dysphagia    #Stridor  S/p intubation for 22hrs, now Bipap  S/p larnygoscopy w concern for subglottic stenosis  Heliox therapy, racemic epi  Steroids- solumedrol 40mg IV q6  ENT eval noted- CT neck wwo pending  Consider inhaled corticosteroids  Pulm following  MICU eval noted, okay for step down    #Allergies/Resp distress  Singulair  Pulmicort  Xopenex  Duoneb q4 PRN  Does not seem fluid overloaded, ProBNP approx 7k, Echo 6/30 shows preserved   EF, mild diastolic dysfxn  ABG noted, mild hypoxia  Follow up AM ProBNP, cardio recs noted discussed with Pulm.   CXR clear.   - Appreciate pulm recs- c/w solu-medrol    #HTN, HLD and CAD s/p stent  #HFpEF  Losartan  Lopressor 25 BID  Hydralazine PRN  Labetalol PRN  Statin  ASA on hold 2/2 bleed  Appreciate cards recs- will c/w IV lasix QD    #Troponemia  Likely demand from above, trended down and remained flat  No chest pain or ischemic ekg changes    #HypoK, Hypophos  Resolved  Recheck, replete prn    #Psych  Zoloft    #GERD  Pronotix    DVT ppx:   Lovenox    S/S eval- Regular diet

## 2024-07-03 NOTE — PROGRESS NOTE ADULT - SUBJECTIVE AND OBJECTIVE BOX
[facility]                                                         CARDIOLOGY AT [place] [address]                                                         Telephone: [phone]. Fax:[phone]                                                                             PROGRESS NOTE    Reason for follow up: HFpF  Update: Patient's breathing continues to improve, and tolerated BiPAP overnight. Pt's pBNP downtrending. BP still uncontrolled.       Review of symptoms:   Cardiac:  No chest pain. No dyspnea. No palpitations.  Respiratory: no cough. No dyspnea  Gastrointestinal: No diarrhea. No abdominal pain. No bleeding.   Neuro: No focal neuro complaints.    Vitals:  T(C): 37 (07-03-24 @ 08:00), Max: 37 (07-02-24 @ 18:28)  HR: 87 (07-03-24 @ 08:39) (82 - 98)  BP: 177/80 (07-03-24 @ 08:00) (154/69 - 193/101)  RR: 20 (07-03-24 @ 08:00) (18 - 21)  SpO2: 99% (07-03-24 @ 08:39) (98% - 100%)  Wt(kg): --  I&O's Summary    02 Jul 2024 07:01  -  03 Jul 2024 07:00  --------------------------------------------------------  IN: 200 mL / OUT: 2150 mL / NET: -1950 mL      Weight (kg): 77.3 (06-29 @ 14:15), 75.7 (06-29 @ 06:55)    PHYSICAL EXAM:  Appearance: Comfortable. No acute distress  HEENT:  Atraumatic. Normocephalic.  Normal oral mucosa  Neurologic: A & O x 3, no gross focal deficits.  Cardiovascular: RRR S1 S2, No murmur, no rubs/gallops. No JVD  Respiratory: Decreased air movement bilaterally  Gastrointestinal:  Soft, Non-tender, + BS  Lower Extremities: 2+ Peripheral Pulses, No clubbing, cyanosis, or edema  Psychiatry: Patient is calm. No agitation.   Skin: warm and dry.    CURRENT CARDIAC MEDICATIONS:  amLODIPine   Tablet 5 milliGRAM(s) Oral daily  furosemide   Injectable 40 milliGRAM(s) IV Push daily  hydrALAZINE Injectable 10 milliGRAM(s) IV Push every 2 hours PRN  labetalol Injectable 10 milliGRAM(s) IV Push every 2 hours PRN  losartan 100 milliGRAM(s) Oral daily  metoprolol tartrate 50 milliGRAM(s) Oral two times a day      CURRENT OTHER MEDICATIONS:  albuterol/ipratropium for Nebulization 3 milliLiter(s) Nebulizer every 4 hours  buDESOnide    Inhalation Suspension 0.5 milliGRAM(s) Inhalation every 12 hours  levalbuterol Inhalation 0.63 milliGRAM(s) Inhalation every 6 hours PRN SOB/wheezing  montelukast 10 milliGRAM(s) Oral daily  acetaminophen     Tablet .. 650 milliGRAM(s) Oral every 6 hours PRN Temp greater or equal to 38C (100.4F), Mild Pain (1 - 3)  levETIRAcetam   Injectable 1000 milliGRAM(s) IV Push every 12 hours  sertraline 100 milliGRAM(s) Oral daily  pantoprazole    Tablet 40 milliGRAM(s) Oral before breakfast, Stop order after: 5 Days  polyethylene glycol 3350 17 Gram(s) Oral daily  senna Syrup 10 milliLiter(s) Oral at bedtime  atorvastatin 40 milliGRAM(s) Oral at bedtime  dextrose 50% Injectable 12.5 Gram(s) IV Push once, Stop order after: 1 Doses  dextrose 50% Injectable 25 Gram(s) IV Push once, Stop order after: 1 Doses  dextrose Oral Gel 15 Gram(s) Oral once, Stop order after: 1 Doses PRN Blood Glucose LESS THAN 70 milliGRAM(s)/deciliter  glucagon  Injectable 1 milliGRAM(s) IntraMuscular once, Stop order after: 1 Doses  insulin lispro (ADMELOG) corrective regimen sliding scale   SubCutaneous at bedtime  insulin lispro (ADMELOG) corrective regimen sliding scale   SubCutaneous three times a day before meals  methylPREDNISolone sodium succinate Injectable 40 milliGRAM(s) IV Push every 6 hours  dextrose 10% Bolus 125 milliLiter(s) IV Bolus once, Stop order after: 1 Doses  dextrose 5%. 1000 milliLiter(s) (50 mL/Hr) IV Continuous <Continuous>  dextrose 5%. 1000 milliLiter(s) (100 mL/Hr) IV Continuous <Continuous>  enoxaparin Injectable 40 milliGRAM(s) SubCutaneous every 24 hours  lidocaine   4% Patch 1 Patch Transdermal every 24 hours      LABS:	 	  ( 02 Jul 2024 22:13 )  Troponin T  X    ,  CPK  614<H>, CKMB  X    , BNP X        , ( 29 Jun 2024 07:15 )  Troponin T  X    ,  CPK  104  , CKMB  X    , BNP X                                  12.1   16.33 )-----------( 280      ( 03 Jul 2024 05:40 )             36.5     07-03    142  |  104  |  20.0  ----------------------------<  122<H>  3.0<L>   |  25.0  |  0.78    Ca    8.7      03 Jul 2024 05:40  Phos  3.7     07-03  Mg     2.1     07-03      PT/INR/PTT ( 29 Jun 2024 12:07 )                       :                       :      11.8         :       32.9                  .        .                   .              .           .       1.07        .                                       Lipid Profile: Date: 06-29 @ 14:50  Total cholesterol 128; Direct LDL: --; HDL: 60; Triglycerides:105    HgA1c:   TSH: Thyroid Stimulating Hormone, Serum: 2.35 uIU/mL      TELEMETRY: SR, NSVT, PACs  ECG:    DIAGNOSTIC TESTING:  [ ] Echocardiogram:   < from: TTE W or WO Ultrasound Enhancing Agent (06.30.24 @ 08:50) >  CONCLUSIONS:      1. Left ventricular cavity is normal in size. Left ventricular systolic function is normal with an ejection fraction visually estimated at 60 to 65 %.   2. There is mild (grade 1) left ventricular diastolic dysfunction.   3. Normal right ventricular cavity size and normal right ventricular systolic function.   4. The left atrium is normal in size.   5. The right atrium is normal in size.   6. Structurally normal mitral valve with normal leaflet excursion.   7. No pericardial effusion seen.   8. Mild tricuspid regurgitation.   9. Estimated pulmonary artery systolic pressure is 38 mmHg.  10. Mild left ventricular hypertrophy.  11. Technically difficult image quality.  12. Trileaflet aortic valve with normal systolic excursion. Fibrocalcific aortic valve sclerosis without stenosis.      < end of copied text >    [ ]  Catheterization:  [ ] Stress Test:    OTHER:

## 2024-07-03 NOTE — PROGRESS NOTE ADULT - SUBJECTIVE AND OBJECTIVE BOX
PULMONARY PROGRESS NOTE      BIANCA [id number]    Patient is a 79y old  Female who presents with a chief complaint of IPH ?seizure (03 Jul 2024 12:57)      INTERVAL HPI/OVERNIGHT EVENTS:  feels sig better  no CP or SOB  no wheezing  MEDICATIONS  (STANDING):  albuterol/ipratropium for Nebulization 3 milliLiter(s) Nebulizer every 4 hours  amLODIPine   Tablet 5 milliGRAM(s) Oral daily  atorvastatin 40 milliGRAM(s) Oral at bedtime  buDESOnide    Inhalation Suspension 0.5 milliGRAM(s) Inhalation every 12 hours  dextrose 10% Bolus 125 milliLiter(s) IV Bolus once  dextrose 5%. 1000 milliLiter(s) (50 mL/Hr) IV Continuous <Continuous>  dextrose 5%. 1000 milliLiter(s) (100 mL/Hr) IV Continuous <Continuous>  dextrose 50% Injectable 25 Gram(s) IV Push once  dextrose 50% Injectable 12.5 Gram(s) IV Push once  enoxaparin Injectable 40 milliGRAM(s) SubCutaneous every 24 hours  furosemide   Injectable 40 milliGRAM(s) IV Push daily  glucagon  Injectable 1 milliGRAM(s) IntraMuscular once  insulin lispro (ADMELOG) corrective regimen sliding scale   SubCutaneous three times a day before meals  insulin lispro (ADMELOG) corrective regimen sliding scale   SubCutaneous at bedtime  levETIRAcetam   Injectable 1000 milliGRAM(s) IV Push every 12 hours  lidocaine   4% Patch 1 Patch Transdermal every 24 hours  losartan 100 milliGRAM(s) Oral daily  methylPREDNISolone sodium succinate Injectable 40 milliGRAM(s) IV Push every 6 hours  metoprolol tartrate 50 milliGRAM(s) Oral two times a day  montelukast 10 milliGRAM(s) Oral daily  pantoprazole    Tablet 40 milliGRAM(s) Oral before breakfast  polyethylene glycol 3350 17 Gram(s) Oral daily  senna Syrup 10 milliLiter(s) Oral at bedtime  sertraline 100 milliGRAM(s) Oral daily      MEDICATIONS  (PRN):  acetaminophen     Tablet .. 650 milliGRAM(s) Oral every 6 hours PRN Temp greater or equal to 38C (100.4F), Mild Pain (1 - 3)  dextrose Oral Gel 15 Gram(s) Oral once PRN Blood Glucose LESS THAN 70 milliGRAM(s)/deciliter  hydrALAZINE Injectable 10 milliGRAM(s) IV Push every 2 hours PRN Sbp >160  labetalol Injectable 10 milliGRAM(s) IV Push every 2 hours PRN SBP>160  levalbuterol Inhalation 0.63 milliGRAM(s) Inhalation every 6 hours PRN SOB/wheezing      Allergies    penicillins (Unknown)    Intolerances        PAST MEDICAL & SURGICAL HISTORY:      SOCIAL HISTORY  Smoking History:       REVIEW OF SYSTEMS:    CONSTITUTIONAL:  No distress    HEENT:  Eyes:  No diplopia or blurred vision. ENT:  No earache, sore throat or runny nose.    CARDIOVASCULAR:  No pressure, squeezing, tightness, heaviness or aching about the chest; no palpitations.    RESPIRATORY:  see HPI    GASTROINTESTINAL:  No nausea, vomiting or diarrhea.    GENITOURINARY:  No dysuria, frequency or urgency.    NEUROLOGIC:  No paresthesias, fasciculations, seizures or weakness.    PSYCHIATRIC:  No disorder of thought or mood.    Vital Signs Last 24 Hrs  T(C): 36.3 (03 Jul 2024 12:00), Max: 37 (02 Jul 2024 18:28)  T(F): 97.4 (03 Jul 2024 12:00), Max: 98.6 (02 Jul 2024 18:28)  HR: 102 (03 Jul 2024 12:00) (82 - 102)  BP: 183/97 (03 Jul 2024 12:00) (138/98 - 193/101)  BP(mean): 124 (03 Jul 2024 12:00) (91 - 126)  RR: 20 (03 Jul 2024 12:00) (18 - 21)  SpO2: 95% (03 Jul 2024 12:00) (95% - 100%)    Parameters below as of 03 Jul 2024 12:00  Patient On (Oxygen Delivery Method): nasal cannula  O2 Flow (L/min): 3      PHYSICAL EXAMINATION:    GENERAL: The patient is awake and alert in no apparent distress.     HEENT: Head is normocephalic and atraumatic. Extraocular muscles are intact. Mucous membranes are moist.    NECK: Supple.    LUNGS: mod air entry bilat  without wheezing, rales or rhonchi; respirations unlabored    HEART: Regular rate and rhythm without murmur.    ABDOMEN: Soft, nontender, and nondistended.      EXTREMITIES: Without any cyanosis, clubbing, rash, lesions or edema.    NEUROLOGIC: Grossly intact.    LABS:                        12.1   16.33 )-----------( 280      ( 03 Jul 2024 05:40 )             36.5     07-03    142  |  104  |  20.0  ----------------------------<  122<H>  3.0<L>   |  25.0  |  0.78    Ca    8.7      03 Jul 2024 05:40  Phos  3.7     07-03  Mg     2.1     07-03        Urinalysis Basic - ( 03 Jul 2024 05:40 )    Color: x / Appearance: x / SG: x / pH: x  Gluc: 122 mg/dL / Ketone: x  / Bili: x / Urobili: x   Blood: x / Protein: x / Nitrite: x   Leuk Esterase: x / RBC: x / WBC x   Sq Epi: x / Non Sq Epi: x / Bacteria: x      ABG - ( 02 Jul 2024 01:15 )  pH, Arterial: 7.440 pH, Blood: x     /  pCO2: 28    /  pO2: 75    / HCO3: 19    / Base Excess: -5.2  /  SaO2: 96.7              CARDIAC MARKERS ( 02 Jul 2024 22:13 )  x     / x     / 614 U/L / x     / 23.9 ng/mL            Procalcitonin: 0.09 ng/mL (07-02-24 @ 22:13)      MICROBIOLOGY:    RADIOLOGY & ADDITIONAL STUDIES:  CXrs reviewed

## 2024-07-03 NOTE — PROGRESS NOTE ADULT - SUBJECTIVE AND OBJECTIVE BOX
ENT ISSUE/POD: stridor and wheezing    Interval HPI: Pt with improvment in breathing this am since starting breathing treatments, no stridor on room air.         PAST MEDICAL & SURGICAL HISTORY:    Allergies    penicillins (Unknown)    Intolerances      MEDICATIONS  (STANDING):  albuterol/ipratropium for Nebulization 3 milliLiter(s) Nebulizer every 4 hours  amLODIPine   Tablet 5 milliGRAM(s) Oral daily  atorvastatin 40 milliGRAM(s) Oral at bedtime  buDESOnide    Inhalation Suspension 0.5 milliGRAM(s) Inhalation every 12 hours  dextrose 10% Bolus 125 milliLiter(s) IV Bolus once  dextrose 5%. 1000 milliLiter(s) (50 mL/Hr) IV Continuous <Continuous>  dextrose 5%. 1000 milliLiter(s) (100 mL/Hr) IV Continuous <Continuous>  dextrose 50% Injectable 25 Gram(s) IV Push once  dextrose 50% Injectable 12.5 Gram(s) IV Push once  enoxaparin Injectable 40 milliGRAM(s) SubCutaneous every 24 hours  furosemide   Injectable 40 milliGRAM(s) IV Push daily  glucagon  Injectable 1 milliGRAM(s) IntraMuscular once  insulin lispro (ADMELOG) corrective regimen sliding scale   SubCutaneous at bedtime  insulin lispro (ADMELOG) corrective regimen sliding scale   SubCutaneous three times a day before meals  levETIRAcetam   Injectable 1000 milliGRAM(s) IV Push every 12 hours  lidocaine   4% Patch 1 Patch Transdermal every 24 hours  losartan 100 milliGRAM(s) Oral daily  methylPREDNISolone sodium succinate Injectable 40 milliGRAM(s) IV Push every 6 hours  metoprolol tartrate 50 milliGRAM(s) Oral two times a day  montelukast 10 milliGRAM(s) Oral daily  pantoprazole    Tablet 40 milliGRAM(s) Oral before breakfast  polyethylene glycol 3350 17 Gram(s) Oral daily  senna Syrup 10 milliLiter(s) Oral at bedtime  sertraline 100 milliGRAM(s) Oral daily    MEDICATIONS  (PRN):  acetaminophen     Tablet .. 650 milliGRAM(s) Oral every 6 hours PRN Temp greater or equal to 38C (100.4F), Mild Pain (1 - 3)  dextrose Oral Gel 15 Gram(s) Oral once PRN Blood Glucose LESS THAN 70 milliGRAM(s)/deciliter  hydrALAZINE Injectable 10 milliGRAM(s) IV Push every 2 hours PRN Sbp >160  labetalol Injectable 10 milliGRAM(s) IV Push every 2 hours PRN SBP>160  levalbuterol Inhalation 0.63 milliGRAM(s) Inhalation every 6 hours PRN SOB/wheezing        ROS:   ENT: all negative except as noted in HPI   Pulm: denies SOB, cough, hemoptysis  Neuro: denies numbness/tingling, loss of sensation  Endo: denies heat/cold intolerance, excessive sweating      Vital Signs Last 24 Hrs  T(C): 37 (03 Jul 2024 08:00), Max: 37 (02 Jul 2024 18:28)  T(F): 98.6 (03 Jul 2024 08:00), Max: 98.6 (02 Jul 2024 18:28)  HR: 87 (03 Jul 2024 08:39) (82 - 98)  BP: 177/80 (03 Jul 2024 08:00) (154/69 - 193/101)  BP(mean): 108 (03 Jul 2024 08:00) (91 - 126)  RR: 20 (03 Jul 2024 08:00) (18 - 21)  SpO2: 99% (03 Jul 2024 08:39) (98% - 100%)    Parameters below as of 03 Jul 2024 08:39  Patient On (Oxygen Delivery Method): nasal cannula, 3l                              12.1   16.33 )-----------( 280      ( 03 Jul 2024 05:40 )             36.5    07-03    142  |  104  |  20.0  ----------------------------<  122<H>  3.0<L>   |  25.0  |  0.78    Ca    8.7      03 Jul 2024 05:40  Phos  3.7     07-03  Mg     2.1     07-03         PHYSICAL EXAM:  Gen: NAD  Skin: No rashes, bruises, or lesions  Head: Normocephalic, Atraumatic  Face: no edema, erythema, or fluctuance. Parotid glands soft without mass  Eyes: no scleral injection  Nose: Nares bilaterally patent, no discharge  Mouth: No Stridor / Drooling / Trismus.  Mucosa moist, tongue/uvula midline, oropharynx clear  Neck: Flat, supple, no lymphadenopathy, trachea midline, no masses  Lymphatic: No lymphadenopathy  Resp: breathing easily, no stridor  Neuro: facial nerve intact, no facial droop

## 2024-07-03 NOTE — PROGRESS NOTE ADULT - ASSESSMENT
80 yo F admitted w intracranial hemorrhage and tx ofseizures, intubated x1 day with subsequent stridor w improvement on BiPAP. 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

## 2024-07-04 LAB
ALBUMIN SERPL ELPH-MCNC: 3.2 G/DL — LOW (ref 3.3–5.2)
ALP SERPL-CCNC: 72 U/L — SIGNIFICANT CHANGE UP (ref 40–120)
ALT FLD-CCNC: 39 U/L — HIGH
ANION GAP SERPL CALC-SCNC: 14 MMOL/L — SIGNIFICANT CHANGE UP (ref 5–17)
AST SERPL-CCNC: 36 U/L — HIGH
BILIRUB SERPL-MCNC: 0.8 MG/DL — SIGNIFICANT CHANGE UP (ref 0.4–2)
BUN SERPL-MCNC: 23 MG/DL — HIGH (ref 8–20)
CALCIUM SERPL-MCNC: 8.8 MG/DL — SIGNIFICANT CHANGE UP (ref 8.4–10.5)
CHLORIDE SERPL-SCNC: 100 MMOL/L — SIGNIFICANT CHANGE UP (ref 96–108)
CO2 SERPL-SCNC: 25 MMOL/L — SIGNIFICANT CHANGE UP (ref 22–29)
CREAT SERPL-MCNC: 0.76 MG/DL — SIGNIFICANT CHANGE UP (ref 0.5–1.3)
CULTURE RESULTS: SIGNIFICANT CHANGE UP
EGFR: 80 ML/MIN/1.73M2 — SIGNIFICANT CHANGE UP
GLUCOSE BLDC GLUCOMTR-MCNC: 125 MG/DL — HIGH (ref 70–99)
GLUCOSE BLDC GLUCOMTR-MCNC: 133 MG/DL — HIGH (ref 70–99)
GLUCOSE BLDC GLUCOMTR-MCNC: 133 MG/DL — HIGH (ref 70–99)
GLUCOSE BLDC GLUCOMTR-MCNC: 140 MG/DL — HIGH (ref 70–99)
GLUCOSE SERPL-MCNC: 131 MG/DL — HIGH (ref 70–99)
HCT VFR BLD CALC: 38.8 % — SIGNIFICANT CHANGE UP (ref 34.5–45)
HGB BLD-MCNC: 12.8 G/DL — SIGNIFICANT CHANGE UP (ref 11.5–15.5)
MCHC RBC-ENTMCNC: 29.1 PG — SIGNIFICANT CHANGE UP (ref 27–34)
MCHC RBC-ENTMCNC: 33 GM/DL — SIGNIFICANT CHANGE UP (ref 32–36)
MCV RBC AUTO: 88.2 FL — SIGNIFICANT CHANGE UP (ref 80–100)
PLATELET # BLD AUTO: 316 K/UL — SIGNIFICANT CHANGE UP (ref 150–400)
POTASSIUM SERPL-MCNC: 3.3 MMOL/L — LOW (ref 3.5–5.3)
POTASSIUM SERPL-SCNC: 3.3 MMOL/L — LOW (ref 3.5–5.3)
PROT SERPL-MCNC: 6.2 G/DL — LOW (ref 6.6–8.7)
RBC # BLD: 4.4 M/UL — SIGNIFICANT CHANGE UP (ref 3.8–5.2)
RBC # FLD: 14.6 % — HIGH (ref 10.3–14.5)
SODIUM SERPL-SCNC: 138 MMOL/L — SIGNIFICANT CHANGE UP (ref 135–145)
SPECIMEN SOURCE: SIGNIFICANT CHANGE UP
WBC # BLD: 12.71 K/UL — HIGH (ref 3.8–10.5)
WBC # FLD AUTO: 12.71 K/UL — HIGH (ref 3.8–10.5)

## 2024-07-04 PROCEDURE — 70553 MRI BRAIN STEM W/O & W/DYE: CPT | Mod: 26

## 2024-07-04 PROCEDURE — 99232 SBSQ HOSP IP/OBS MODERATE 35: CPT

## 2024-07-04 PROCEDURE — 99234 HOSP IP/OBS SM DT SF/LOW 45: CPT

## 2024-07-04 RX ORDER — POTASSIUM CHLORIDE 600 MG/1
40 TABLET, FILM COATED, EXTENDED RELEASE ORAL ONCE
Refills: 0 | Status: COMPLETED | OUTPATIENT
Start: 2024-07-04 | End: 2024-07-04

## 2024-07-04 RX ORDER — HYDRALAZINE HYDROCHLORIDE 50 MG/1
10 TABLET ORAL THREE TIMES A DAY
Refills: 0 | Status: DISCONTINUED | OUTPATIENT
Start: 2024-07-04 | End: 2024-07-04

## 2024-07-04 RX ORDER — AMLODIPINE BESYLATE 2.5 MG/1
5 TABLET ORAL ONCE
Refills: 0 | Status: COMPLETED | OUTPATIENT
Start: 2024-07-04 | End: 2024-07-04

## 2024-07-04 RX ORDER — AMLODIPINE BESYLATE 2.5 MG/1
10 TABLET ORAL DAILY
Refills: 0 | Status: DISCONTINUED | OUTPATIENT
Start: 2024-07-05 | End: 2024-07-11

## 2024-07-04 RX ADMIN — LIDOCAINE HCL 1 PATCH: 28 GEL TOPICAL at 21:29

## 2024-07-04 RX ADMIN — IPRATROPIUM BROMIDE AND ALBUTEROL SULFATE 3 MILLILITER(S): .5; 3 SOLUTION RESPIRATORY (INHALATION) at 14:55

## 2024-07-04 RX ADMIN — AMLODIPINE BESYLATE 5 MILLIGRAM(S): 2.5 TABLET ORAL at 18:43

## 2024-07-04 RX ADMIN — LABETALOL HYDROCHLORIDE 10 MILLIGRAM(S): 300 TABLET ORAL at 08:06

## 2024-07-04 RX ADMIN — LABETALOL HYDROCHLORIDE 10 MILLIGRAM(S): 300 TABLET ORAL at 02:09

## 2024-07-04 RX ADMIN — LOSARTAN POTASSIUM 100 MILLIGRAM(S): 100 TABLET, FILM COATED ORAL at 05:29

## 2024-07-04 RX ADMIN — BUDESONIDE 0.5 MILLIGRAM(S): 0.25 INHALANT ORAL at 21:14

## 2024-07-04 RX ADMIN — Medication 50 MILLIGRAM(S): at 05:29

## 2024-07-04 RX ADMIN — BUDESONIDE 0.5 MILLIGRAM(S): 0.25 INHALANT ORAL at 08:50

## 2024-07-04 RX ADMIN — SERTRALINE HYDROCHLORIDE 100 MILLIGRAM(S): 100 TABLET, FILM COATED ORAL at 13:57

## 2024-07-04 RX ADMIN — LEVETIRACETAM 1000 MILLIGRAM(S): 100 INJECTION INTRAVENOUS at 05:29

## 2024-07-04 RX ADMIN — Medication 40 MILLIGRAM(S): at 21:30

## 2024-07-04 RX ADMIN — Medication 10 MILLILITER(S): at 21:29

## 2024-07-04 RX ADMIN — AMLODIPINE BESYLATE 5 MILLIGRAM(S): 2.5 TABLET ORAL at 05:30

## 2024-07-04 RX ADMIN — Medication 650 MILLIGRAM(S): at 20:17

## 2024-07-04 RX ADMIN — HYDRALAZINE HYDROCHLORIDE 10 MILLIGRAM(S): 50 TABLET ORAL at 09:19

## 2024-07-04 RX ADMIN — ATORVASTATIN CALCIUM 40 MILLIGRAM(S): 20 TABLET, FILM COATED ORAL at 21:29

## 2024-07-04 RX ADMIN — IPRATROPIUM BROMIDE AND ALBUTEROL SULFATE 3 MILLILITER(S): .5; 3 SOLUTION RESPIRATORY (INHALATION) at 21:14

## 2024-07-04 RX ADMIN — Medication 40 MILLIGRAM(S): at 13:56

## 2024-07-04 RX ADMIN — Medication 650 MILLIGRAM(S): at 21:17

## 2024-07-04 RX ADMIN — IPRATROPIUM BROMIDE AND ALBUTEROL SULFATE 3 MILLILITER(S): .5; 3 SOLUTION RESPIRATORY (INHALATION) at 08:50

## 2024-07-04 RX ADMIN — Medication 40 MILLIGRAM(S): at 05:29

## 2024-07-04 RX ADMIN — HYDRALAZINE HYDROCHLORIDE 10 MILLIGRAM(S): 50 TABLET ORAL at 22:23

## 2024-07-04 RX ADMIN — POTASSIUM CHLORIDE 40 MILLIEQUIVALENT(S): 600 TABLET, FILM COATED, EXTENDED RELEASE ORAL at 13:56

## 2024-07-04 RX ADMIN — HYDRALAZINE HYDROCHLORIDE 10 MILLIGRAM(S): 50 TABLET ORAL at 04:25

## 2024-07-04 RX ADMIN — HYDRALAZINE HYDROCHLORIDE 10 MILLIGRAM(S): 50 TABLET ORAL at 14:16

## 2024-07-04 RX ADMIN — HYDRALAZINE HYDROCHLORIDE 10 MILLIGRAM(S): 50 TABLET ORAL at 15:39

## 2024-07-04 RX ADMIN — LIDOCAINE HCL 1 PATCH: 28 GEL TOPICAL at 15:18

## 2024-07-04 RX ADMIN — LEVETIRACETAM 1000 MILLIGRAM(S): 100 INJECTION INTRAVENOUS at 17:59

## 2024-07-04 RX ADMIN — Medication 50 MILLIGRAM(S): at 18:01

## 2024-07-04 RX ADMIN — PANTOPRAZOLE SODIUM 40 MILLIGRAM(S): 40 INJECTION, POWDER, FOR SOLUTION INTRAVENOUS at 05:30

## 2024-07-04 RX ADMIN — MONTELUKAST SODIUM 10 MILLIGRAM(S): 10 TABLET, FILM COATED ORAL at 14:16

## 2024-07-04 RX ADMIN — IPRATROPIUM BROMIDE AND ALBUTEROL SULFATE 3 MILLILITER(S): .5; 3 SOLUTION RESPIRATORY (INHALATION) at 03:39

## 2024-07-04 RX ADMIN — LIDOCAINE HCL 1 PATCH: 28 GEL TOPICAL at 11:00

## 2024-07-04 RX ADMIN — FUROSEMIDE 40 MILLIGRAM(S): 10 INJECTION, SOLUTION INTRAMUSCULAR; INTRAVENOUS at 05:29

## 2024-07-04 NOTE — PROGRESS NOTE ADULT - SUBJECTIVE AND OBJECTIVE BOX
Patient is a 79y old  Female who presents with a chief complaint of IPH ?seizure (03 Jul 2024 13:51)      INTERVAL HPI/OVERNIGHT EVENTS: Overnight, BP elevated to 160s-180s.    MEDICATIONS  (STANDING):  albuterol/ipratropium for Nebulization 3 milliLiter(s) Nebulizer every 6 hours  amLODIPine   Tablet 5 milliGRAM(s) Oral daily  atorvastatin 40 milliGRAM(s) Oral at bedtime  buDESOnide    Inhalation Suspension 0.5 milliGRAM(s) Inhalation every 12 hours  dextrose 10% Bolus 125 milliLiter(s) IV Bolus once  dextrose 5%. 1000 milliLiter(s) (50 mL/Hr) IV Continuous <Continuous>  dextrose 5%. 1000 milliLiter(s) (100 mL/Hr) IV Continuous <Continuous>  dextrose 50% Injectable 25 Gram(s) IV Push once  dextrose 50% Injectable 12.5 Gram(s) IV Push once  enoxaparin Injectable 40 milliGRAM(s) SubCutaneous every 24 hours  furosemide   Injectable 40 milliGRAM(s) IV Push daily  glucagon  Injectable 1 milliGRAM(s) IntraMuscular once  hydrALAZINE 10 milliGRAM(s) Oral three times a day  insulin lispro (ADMELOG) corrective regimen sliding scale   SubCutaneous at bedtime  insulin lispro (ADMELOG) corrective regimen sliding scale   SubCutaneous three times a day before meals  levETIRAcetam   Injectable 1000 milliGRAM(s) IV Push every 12 hours  lidocaine   4% Patch 1 Patch Transdermal every 24 hours  losartan 100 milliGRAM(s) Oral daily  methylPREDNISolone sodium succinate Injectable 40 milliGRAM(s) IV Push every 8 hours  metoprolol tartrate 50 milliGRAM(s) Oral two times a day  montelukast 10 milliGRAM(s) Oral daily  pantoprazole    Tablet 40 milliGRAM(s) Oral before breakfast  polyethylene glycol 3350 17 Gram(s) Oral daily  potassium chloride    Tablet ER 40 milliEquivalent(s) Oral once  senna Syrup 10 milliLiter(s) Oral at bedtime  sertraline 100 milliGRAM(s) Oral daily    MEDICATIONS  (PRN):  acetaminophen     Tablet .. 650 milliGRAM(s) Oral every 6 hours PRN Temp greater or equal to 38C (100.4F), Mild Pain (1 - 3)  dextrose Oral Gel 15 Gram(s) Oral once PRN Blood Glucose LESS THAN 70 milliGRAM(s)/deciliter  hydrALAZINE Injectable 10 milliGRAM(s) IV Push every 2 hours PRN Sbp >160  labetalol Injectable 10 milliGRAM(s) IV Push every 2 hours PRN SBP>160  levalbuterol Inhalation 0.63 milliGRAM(s) Inhalation every 6 hours PRN SOB/wheezing      Allergies    penicillins (Unknown)    Intolerances        REVIEW OF SYSTEMS: all negative with exception of above    Vital Signs Last 24 Hrs  T(C): 36.6 (04 Jul 2024 08:18), Max: 37.3 (03 Jul 2024 20:46)  T(F): 97.8 (04 Jul 2024 08:18), Max: 99.1 (03 Jul 2024 20:46)  HR: 79 (04 Jul 2024 10:00) (79 - 120)  BP: 169/90 (04 Jul 2024 10:00) (135/74 - 183/97)  BP(mean): 111 (04 Jul 2024 10:00) (90 - 124)  RR: 17 (04 Jul 2024 10:00) (17 - 20)  SpO2: 93% (04 Jul 2024 10:00) (93% - 99%)    Parameters below as of 04 Jul 2024 10:00  Patient On (Oxygen Delivery Method): nasal cannula  O2 Flow (L/min): 3      PHYSICAL EXAM:  GENERAL: NAD, well-groomed  NERVOUS SYSTEM:  Alert & Oriented X3, Good concentration; Motor Strength 5/5 B/L upper and lower extremities; DTRs 2+ intact and symmetric  CHEST/LUNG: Clear to percussion bilaterally; No rales, rhonchi, wheezing, or rubs  HEART: Regular rate and rhythm; No murmurs, rubs, or gallops  ABDOMEN: Soft, Nontender, Nondistended; Bowel sounds present  EXTREMITIES:  2+ Peripheral Pulses, No clubbing, cyanosis, or edema      LABS:                        12.8   12.71 )-----------( 316      ( 04 Jul 2024 07:20 )             38.8     07-04    138  |  100  |  23.0<H>  ----------------------------<  131<H>  3.3<L>   |  25.0  |  0.76    Ca    8.8      04 Jul 2024 07:20  Phos  3.7     07-03  Mg     2.1     07-03    TPro  6.2<L>  /  Alb  3.2<L>  /  TBili  0.8  /  DBili  x   /  AST  36<H>  /  ALT  39<H>  /  AlkPhos  72  07-04      Urinalysis Basic - ( 04 Jul 2024 07:20 )    Color: x / Appearance: x / SG: x / pH: x  Gluc: 131 mg/dL / Ketone: x  / Bili: x / Urobili: x   Blood: x / Protein: x / Nitrite: x   Leuk Esterase: x / RBC: x / WBC x   Sq Epi: x / Non Sq Epi: x / Bacteria: x    CAPILLARY BLOOD GLUCOSE      POCT Blood Glucose.: 133 mg/dL (04 Jul 2024 08:12)  POCT Blood Glucose.: 132 mg/dL (03 Jul 2024 22:08)  POCT Blood Glucose.: 163 mg/dL (03 Jul 2024 18:38)      RADIOLOGY & ADDITIONAL TESTS:    Imaging Personally Reviewed:  [ ] YES  [ ] NO  < from: CT Neck Soft Tissue No Cont (07.03.24 @ 11:11) >    IMPRESSION:  Interval extubation.    No significant subglottic airway stenosis.    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    --- End of Report ---            SRI LIANG MD; Attending Radiologist  This document has been electronically signed. Jul 4 2024 10:22AM    < end of copied text >        Consultant(s) Notes Reviewed:  [ ] YES  [ ] NO    Care Discussed with Consultants/Other Providers [ ] YES  [ ] NO

## 2024-07-04 NOTE — PROGRESS NOTE ADULT - SUBJECTIVE AND OBJECTIVE BOX
[facility]                                                         CARDIOLOGY AT [place] [address]                                                         Telephone: [phone]. Fax:[phone]                                                                             PROGRESS NOTE    Reason for follow up: ischemic workup   Overall Plan: currently not candidate for ischemic workup due to cerebral hemorrhage, MRI is pending to elucidate etiology of hemorrhage     Review of symptoms:   Cardiac:  No chest pain. No dyspnea. No palpitations.  Respiratory: no cough. No dyspnea  Gastrointestinal: No diarrhea. No abdominal pain. No bleeding.   Neuro: No focal neuro complaints.    Vitals:  T(C): 36.6 (07-04-24 @ 08:18), Max: 37.3 (07-03-24 @ 20:46)  HR: 86 (07-04-24 @ 12:55) (79 - 120)  BP: 132/99 (07-04-24 @ 12:55) (132/99 - 188/80)  RR: 18 (07-04-24 @ 12:55) (17 - 20)  SpO2: 94% (07-04-24 @ 12:55) (93% - 99%)  Wt(kg): --  I&O's Summary    03 Jul 2024 07:01  -  04 Jul 2024 07:00  --------------------------------------------------------  IN: 640 mL / OUT: 1500 mL / NET: -860 mL    04 Jul 2024 07:01  -  04 Jul 2024 13:40  --------------------------------------------------------  IN: 0 mL / OUT: 750 mL / NET: -750 mL      Weight (kg): 77.3 (06-29 @ 14:15)    PHYSICAL EXAM:  Appearance: Comfortable. No acute distress  HEENT:  Atraumatic. Normocephalic.  Normal oral mucosa  Neurologic: A & O x 3, no gross focal deficits.  Cardiovascular: RRR S1 S2, No murmur, no rubs/gallops. No JVD  Respiratory: Lungs clear to auscultation, unlabored   Gastrointestinal:  Soft, Non-tender, + BS  Lower Extremities: 2+ Peripheral Pulses, No clubbing, cyanosis, or edema  Psychiatry: Patient is calm. No agitation.   Skin: warm and dry.    CURRENT CARDIAC MEDICATIONS:  amLODIPine   Tablet 5 milliGRAM(s) Oral daily  furosemide   Injectable 40 milliGRAM(s) IV Push daily  hydrALAZINE 10 milliGRAM(s) Oral three times a day  hydrALAZINE Injectable 10 milliGRAM(s) IV Push every 2 hours PRN  labetalol Injectable 10 milliGRAM(s) IV Push every 2 hours PRN  losartan 100 milliGRAM(s) Oral daily  metoprolol tartrate 50 milliGRAM(s) Oral two times a day      CURRENT OTHER MEDICATIONS:  albuterol/ipratropium for Nebulization 3 milliLiter(s) Nebulizer every 6 hours  buDESOnide    Inhalation Suspension 0.5 milliGRAM(s) Inhalation every 12 hours  levalbuterol Inhalation 0.63 milliGRAM(s) Inhalation every 6 hours PRN SOB/wheezing  montelukast 10 milliGRAM(s) Oral daily  acetaminophen     Tablet .. 650 milliGRAM(s) Oral every 6 hours PRN Temp greater or equal to 38C (100.4F), Mild Pain (1 - 3)  levETIRAcetam   Injectable 1000 milliGRAM(s) IV Push every 12 hours  sertraline 100 milliGRAM(s) Oral daily  pantoprazole    Tablet 40 milliGRAM(s) Oral before breakfast, Stop order after: 5 Days  polyethylene glycol 3350 17 Gram(s) Oral daily  senna Syrup 10 milliLiter(s) Oral at bedtime  atorvastatin 40 milliGRAM(s) Oral at bedtime  dextrose 50% Injectable 25 Gram(s) IV Push once, Stop order after: 1 Doses  dextrose 50% Injectable 12.5 Gram(s) IV Push once, Stop order after: 1 Doses  dextrose Oral Gel 15 Gram(s) Oral once, Stop order after: 1 Doses PRN Blood Glucose LESS THAN 70 milliGRAM(s)/deciliter  glucagon  Injectable 1 milliGRAM(s) IntraMuscular once, Stop order after: 1 Doses  insulin lispro (ADMELOG) corrective regimen sliding scale   SubCutaneous at bedtime  insulin lispro (ADMELOG) corrective regimen sliding scale   SubCutaneous three times a day before meals  methylPREDNISolone sodium succinate Injectable 40 milliGRAM(s) IV Push every 8 hours  dextrose 10% Bolus 125 milliLiter(s) IV Bolus once, Stop order after: 1 Doses  dextrose 5%. 1000 milliLiter(s) (50 mL/Hr) IV Continuous <Continuous>  dextrose 5%. 1000 milliLiter(s) (100 mL/Hr) IV Continuous <Continuous>  enoxaparin Injectable 40 milliGRAM(s) SubCutaneous every 24 hours  lidocaine   4% Patch 1 Patch Transdermal every 24 hours  potassium chloride    Tablet ER 40 milliEquivalent(s) Oral once, Stop order after: 1 Doses      LABS:	 	  ( 02 Jul 2024 22:13 )  Troponin T  X    ,  CPK  614<H>, CKMB  X    , BNP X        , ( 29 Jun 2024 07:15 )  Troponin T  X    ,  CPK  104  , CKMB  X    , BNP X                                  12.8   12.71 )-----------( 316      ( 04 Jul 2024 07:20 )             38.8     07-04    138  |  100  |  23.0<H>  ----------------------------<  131<H>  3.3<L>   |  25.0  |  0.76    Ca    8.8      04 Jul 2024 07:20  Phos  3.7     07-03  Mg     2.1     07-03    TPro  6.2<L>  /  Alb  3.2<L>  /  TBili  0.8  /  DBili  x   /  AST  36<H>  /  ALT  39<H>  /  AlkPhos  72  07-04    PT/INR/PTT ( 29 Jun 2024 12:07 )                       :                       :      11.8         :       32.9                  .        .                   .              .           .       1.07        .                                       Lipid Profile: Date: 06-29 @ 14:50  Total cholesterol 128; Direct LDL: --; HDL: 60; Triglycerides:105    HgA1c:   TSH: Thyroid Stimulating Hormone, Serum: 2.35 uIU/mL

## 2024-07-04 NOTE — PROGRESS NOTE ADULT - ASSESSMENT
A/P: 78 y/o with hx of HTN, HLD, CAD s/p stent in 2000 (family unsure as to what type of stent it is), takes ASA and Crestor presented to [facility] with AMS and urinary incontinence where LKW was 5:30 AM prior to arrival. CTH in [place] revealed R parietal lobe hematoma. Pt transferred to [facility] for further management. Once arrival in [facility] ED, patient was gargling with respirations, so pt was intubated for airway protection. Cardiology consulted for elevated troponin.

## 2024-07-04 NOTE — PROGRESS NOTE ADULT - ASSESSMENT
79F with MHx significant for HTN, HLD and CAD s/p stent who takes ASA presented to [facility] this morning with AMS and urinary incontinence and her last known well approximately 530am. CTH at [place] revealed R parietal lobe hematoma    #R Parietal lobe hematoma surrounding vasogenic edema  #New onset seizures  Transferred from [facility]  Pending MRI Head w/wo  Hold AC/AP (on ASA pta for CAD)  Atorvastatin when able to tolerate PO  KEPPRA 1000mg BID  Seizure precautions  Dysphagia  - CT neck w/o significant stenosis    #Stridor  S/p intubation for 22hrs, now Bipap  S/p larnygoscopy w concern for subglottic stenosis  Heliox therapy, racemic epi  Steroids- solumedrol 40mg IV q6  ENT eval noted- CT neck wwo pending  Consider inhaled corticosteroids  Pulm following  MICU eval noted, okay for step down    #Allergies/Resp distress  Singulair  Pulmicort  Xopenex  Duoneb q4 PRN  Does not seem fluid overloaded, ProBNP approx 7k, Echo 6/30 shows preserved   EF, mild diastolic dysfxn  ABG noted, mild hypoxia  Follow up AM ProBNP, cardio recs noted discussed with Pulm.   CXR clear.   - Appreciate pulm recs- c/w solu-medrol    #HTN, HLD and CAD s/p stent  #HFpEF  Losartan  Lopressor 25 BID  Hydralazine PRN  Labetalol PRN  Statin  ASA on hold 2/2 bleed  - Appreciate cards recs- will c/w IV lasix QD  - Added hydralazine 10 TID    #Troponemia  Likely demand from above, trended down and remained flat  No chest pain or ischemic ekg changes    #HypoK, Hypophos  Resolved  Recheck, replete prn    #Psych  Zoloft    #GERD  Pronotix    DVT ppx:   Lovenox    S/S eval- Regular diet

## 2024-07-05 LAB
ALBUMIN SERPL ELPH-MCNC: 3.1 G/DL — LOW (ref 3.3–5.2)
ALP SERPL-CCNC: 72 U/L — SIGNIFICANT CHANGE UP (ref 40–120)
ALT FLD-CCNC: 36 U/L — HIGH
ANION GAP SERPL CALC-SCNC: 13 MMOL/L — SIGNIFICANT CHANGE UP (ref 5–17)
AST SERPL-CCNC: 29 U/L — SIGNIFICANT CHANGE UP
BILIRUB SERPL-MCNC: 0.7 MG/DL — SIGNIFICANT CHANGE UP (ref 0.4–2)
BUN SERPL-MCNC: 23.6 MG/DL — HIGH (ref 8–20)
CALCIUM SERPL-MCNC: 8.7 MG/DL — SIGNIFICANT CHANGE UP (ref 8.4–10.5)
CHLORIDE SERPL-SCNC: 98 MMOL/L — SIGNIFICANT CHANGE UP (ref 96–108)
CO2 SERPL-SCNC: 27 MMOL/L — SIGNIFICANT CHANGE UP (ref 22–29)
CREAT SERPL-MCNC: 0.69 MG/DL — SIGNIFICANT CHANGE UP (ref 0.5–1.3)
EGFR: 88 ML/MIN/1.73M2 — SIGNIFICANT CHANGE UP
GLUCOSE BLDC GLUCOMTR-MCNC: 115 MG/DL — HIGH (ref 70–99)
GLUCOSE BLDC GLUCOMTR-MCNC: 116 MG/DL — HIGH (ref 70–99)
GLUCOSE BLDC GLUCOMTR-MCNC: 124 MG/DL — HIGH (ref 70–99)
GLUCOSE BLDC GLUCOMTR-MCNC: 182 MG/DL — HIGH (ref 70–99)
GLUCOSE SERPL-MCNC: 120 MG/DL — HIGH (ref 70–99)
HCT VFR BLD CALC: 38.4 % — SIGNIFICANT CHANGE UP (ref 34.5–45)
HGB BLD-MCNC: 12.7 G/DL — SIGNIFICANT CHANGE UP (ref 11.5–15.5)
KAPPA LC SER QL IFE: 1.67 MG/DL — SIGNIFICANT CHANGE UP (ref 0.33–1.94)
KAPPA/LAMBDA FREE LIGHT CHAIN RATIO, SERUM: 1.52 RATIO — SIGNIFICANT CHANGE UP (ref 0.26–1.65)
LAMBDA LC SER QL IFE: 1.1 MG/DL — SIGNIFICANT CHANGE UP (ref 0.57–2.63)
MCHC RBC-ENTMCNC: 29.4 PG — SIGNIFICANT CHANGE UP (ref 27–34)
MCHC RBC-ENTMCNC: 33.1 GM/DL — SIGNIFICANT CHANGE UP (ref 32–36)
MCV RBC AUTO: 88.9 FL — SIGNIFICANT CHANGE UP (ref 80–100)
PLATELET # BLD AUTO: 345 K/UL — SIGNIFICANT CHANGE UP (ref 150–400)
POTASSIUM SERPL-MCNC: 3.8 MMOL/L — SIGNIFICANT CHANGE UP (ref 3.5–5.3)
POTASSIUM SERPL-SCNC: 3.8 MMOL/L — SIGNIFICANT CHANGE UP (ref 3.5–5.3)
PROT SERPL-MCNC: 6.2 G/DL — LOW (ref 6.6–8.7)
RBC # BLD: 4.32 M/UL — SIGNIFICANT CHANGE UP (ref 3.8–5.2)
RBC # FLD: 14.4 % — SIGNIFICANT CHANGE UP (ref 10.3–14.5)
SODIUM SERPL-SCNC: 138 MMOL/L — SIGNIFICANT CHANGE UP (ref 135–145)
WBC # BLD: 11.36 K/UL — HIGH (ref 3.8–10.5)
WBC # FLD AUTO: 11.36 K/UL — HIGH (ref 3.8–10.5)

## 2024-07-05 PROCEDURE — 99233 SBSQ HOSP IP/OBS HIGH 50: CPT

## 2024-07-05 PROCEDURE — 99232 SBSQ HOSP IP/OBS MODERATE 35: CPT

## 2024-07-05 PROCEDURE — 99234 HOSP IP/OBS SM DT SF/LOW 45: CPT

## 2024-07-05 RX ORDER — FUROSEMIDE 10 MG/ML
40 INJECTION, SOLUTION INTRAMUSCULAR; INTRAVENOUS DAILY
Refills: 0 | Status: DISCONTINUED | OUTPATIENT
Start: 2024-07-05 | End: 2024-07-11

## 2024-07-05 RX ORDER — PREDNISONE 10 MG/1
40 TABLET ORAL DAILY
Refills: 0 | Status: COMPLETED | OUTPATIENT
Start: 2024-07-05 | End: 2024-07-10

## 2024-07-05 RX ORDER — ENOXAPARIN SODIUM 100 MG/ML
40 INJECTION SUBCUTANEOUS EVERY 24 HOURS
Refills: 0 | Status: DISCONTINUED | OUTPATIENT
Start: 2024-07-05 | End: 2024-07-11

## 2024-07-05 RX ORDER — SPIRONOLACTONE 25 MG/1
50 TABLET ORAL DAILY
Refills: 0 | Status: DISCONTINUED | OUTPATIENT
Start: 2024-07-05 | End: 2024-07-06

## 2024-07-05 RX ORDER — BENZOCAINE AND MENTHOL 15; 3.6 MG/1; MG/1
1 LOZENGE ORAL
Refills: 0 | Status: DISCONTINUED | OUTPATIENT
Start: 2024-07-05 | End: 2024-07-11

## 2024-07-05 RX ADMIN — LOSARTAN POTASSIUM 100 MILLIGRAM(S): 100 TABLET, FILM COATED ORAL at 05:48

## 2024-07-05 RX ADMIN — HYDRALAZINE HYDROCHLORIDE 10 MILLIGRAM(S): 50 TABLET ORAL at 08:06

## 2024-07-05 RX ADMIN — ATORVASTATIN CALCIUM 40 MILLIGRAM(S): 20 TABLET, FILM COATED ORAL at 21:15

## 2024-07-05 RX ADMIN — MONTELUKAST SODIUM 10 MILLIGRAM(S): 10 TABLET, FILM COATED ORAL at 13:16

## 2024-07-05 RX ADMIN — LEVETIRACETAM 1000 MILLIGRAM(S): 100 INJECTION INTRAVENOUS at 17:42

## 2024-07-05 RX ADMIN — Medication 650 MILLIGRAM(S): at 21:24

## 2024-07-05 RX ADMIN — FUROSEMIDE 40 MILLIGRAM(S): 10 INJECTION, SOLUTION INTRAMUSCULAR; INTRAVENOUS at 05:47

## 2024-07-05 RX ADMIN — LIDOCAINE HCL 1 PATCH: 28 GEL TOPICAL at 21:16

## 2024-07-05 RX ADMIN — IPRATROPIUM BROMIDE AND ALBUTEROL SULFATE 3 MILLILITER(S): .5; 3 SOLUTION RESPIRATORY (INHALATION) at 15:04

## 2024-07-05 RX ADMIN — Medication 650 MILLIGRAM(S): at 08:56

## 2024-07-05 RX ADMIN — HYDRALAZINE HYDROCHLORIDE 10 MILLIGRAM(S): 50 TABLET ORAL at 02:24

## 2024-07-05 RX ADMIN — AMLODIPINE BESYLATE 10 MILLIGRAM(S): 2.5 TABLET ORAL at 05:47

## 2024-07-05 RX ADMIN — LEVETIRACETAM 1000 MILLIGRAM(S): 100 INJECTION INTRAVENOUS at 05:47

## 2024-07-05 RX ADMIN — BUDESONIDE 0.5 MILLIGRAM(S): 0.25 INHALANT ORAL at 20:51

## 2024-07-05 RX ADMIN — BUDESONIDE 0.5 MILLIGRAM(S): 0.25 INHALANT ORAL at 07:57

## 2024-07-05 RX ADMIN — PANTOPRAZOLE SODIUM 40 MILLIGRAM(S): 40 INJECTION, POWDER, FOR SOLUTION INTRAVENOUS at 05:47

## 2024-07-05 RX ADMIN — SPIRONOLACTONE 50 MILLIGRAM(S): 25 TABLET ORAL at 13:16

## 2024-07-05 RX ADMIN — IPRATROPIUM BROMIDE AND ALBUTEROL SULFATE 3 MILLILITER(S): .5; 3 SOLUTION RESPIRATORY (INHALATION) at 20:51

## 2024-07-05 RX ADMIN — SERTRALINE HYDROCHLORIDE 100 MILLIGRAM(S): 100 TABLET, FILM COATED ORAL at 13:16

## 2024-07-05 RX ADMIN — Medication 40 MILLIGRAM(S): at 13:16

## 2024-07-05 RX ADMIN — ENOXAPARIN SODIUM 40 MILLIGRAM(S): 100 INJECTION SUBCUTANEOUS at 17:44

## 2024-07-05 RX ADMIN — LIDOCAINE HCL 1 PATCH: 28 GEL TOPICAL at 05:36

## 2024-07-05 RX ADMIN — Medication 40 MILLIGRAM(S): at 05:47

## 2024-07-05 RX ADMIN — LIDOCAINE HCL 1 PATCH: 28 GEL TOPICAL at 11:22

## 2024-07-05 RX ADMIN — BENZOCAINE AND MENTHOL 1 LOZENGE: 15; 3.6 LOZENGE ORAL at 20:25

## 2024-07-05 RX ADMIN — Medication 50 MILLIGRAM(S): at 05:47

## 2024-07-05 RX ADMIN — Medication 50 MILLIGRAM(S): at 17:43

## 2024-07-05 RX ADMIN — HYDRALAZINE HYDROCHLORIDE 10 MILLIGRAM(S): 50 TABLET ORAL at 22:11

## 2024-07-05 RX ADMIN — IPRATROPIUM BROMIDE AND ALBUTEROL SULFATE 3 MILLILITER(S): .5; 3 SOLUTION RESPIRATORY (INHALATION) at 07:57

## 2024-07-05 RX ADMIN — HYDRALAZINE HYDROCHLORIDE 10 MILLIGRAM(S): 50 TABLET ORAL at 15:08

## 2024-07-05 RX ADMIN — Medication 650 MILLIGRAM(S): at 09:00

## 2024-07-05 RX ADMIN — Medication 650 MILLIGRAM(S): at 22:24

## 2024-07-05 NOTE — PHYSICAL THERAPY INITIAL EVALUATION ADULT - ADDITIONAL COMMENTS
as per medical chart: pt lives in private home with her  and daughter.  able to assist minimally, daughter works. Pt has no steps to enter, needs are met on the first floor. Independent prior to admission. Recently has been using a cane due to balance deficits, admits to 3-4 falls within the last 6 months
Obtained information from daughter at bedside - pt lives in private home with her  and daughter.  able to assist minimally, daughter works. Pt has no steps to enter, needs are met on the first floor. Independent prior to admission. Recently has been using a cane due to balance deficits, admits to 3-4 falls within the last 6 months

## 2024-07-05 NOTE — PHYSICAL THERAPY INITIAL EVALUATION ADULT - GENERAL OBSERVATIONS, REHAB EVAL
received semi cruz position in bed, cardiac monitor, primafit, RN agreeable to PT, daughter present
Pt received in bed + IV + dia + tele//BP monitoring + VCBs, in 10/10 left shoulder pain breathing on RA, daughter present to translate as needed, agreeable to PT evaluation

## 2024-07-05 NOTE — PROGRESS NOTE ADULT - SUBJECTIVE AND OBJECTIVE BOX
PULMONARY PROGRESS NOTE      BIANCA IZQUIERDO  MRN-[id number]    Patient is a 79y old  Female who presents with a chief complaint of IPH ?seizure (05 Jul 2024 14:43)        INTERVAL HPI/OVERNIGHT EVENTS:  Pt denies worsening cough or SOB. No worsening headaches or blurry vision.    MEDICATIONS  (STANDING):  albuterol/ipratropium for Nebulization 3 milliLiter(s) Nebulizer every 6 hours  amLODIPine   Tablet 10 milliGRAM(s) Oral daily  atorvastatin 40 milliGRAM(s) Oral at bedtime  buDESOnide    Inhalation Suspension 0.5 milliGRAM(s) Inhalation every 12 hours  dextrose 10% Bolus 125 milliLiter(s) IV Bolus once  dextrose 5%. 1000 milliLiter(s) (50 mL/Hr) IV Continuous <Continuous>  dextrose 5%. 1000 milliLiter(s) (100 mL/Hr) IV Continuous <Continuous>  dextrose 50% Injectable 12.5 Gram(s) IV Push once  dextrose 50% Injectable 25 Gram(s) IV Push once  enoxaparin Injectable 40 milliGRAM(s) SubCutaneous every 24 hours  furosemide    Tablet 40 milliGRAM(s) Oral daily  glucagon  Injectable 1 milliGRAM(s) IntraMuscular once  insulin lispro (ADMELOG) corrective regimen sliding scale   SubCutaneous at bedtime  insulin lispro (ADMELOG) corrective regimen sliding scale   SubCutaneous three times a day before meals  levETIRAcetam   Injectable 1000 milliGRAM(s) IV Push every 12 hours  lidocaine   4% Patch 1 Patch Transdermal every 24 hours  losartan 100 milliGRAM(s) Oral daily  metoprolol tartrate 50 milliGRAM(s) Oral two times a day  montelukast 10 milliGRAM(s) Oral daily  pantoprazole    Tablet 40 milliGRAM(s) Oral before breakfast  polyethylene glycol 3350 17 Gram(s) Oral daily  predniSONE   Tablet 40 milliGRAM(s) Oral daily  senna Syrup 10 milliLiter(s) Oral at bedtime  sertraline 100 milliGRAM(s) Oral daily  spironolactone 50 milliGRAM(s) Oral daily    MEDICATIONS  (PRN):  acetaminophen     Tablet .. 650 milliGRAM(s) Oral every 6 hours PRN Temp greater or equal to 38C (100.4F), Mild Pain (1 - 3)  dextrose Oral Gel 15 Gram(s) Oral once PRN Blood Glucose LESS THAN 70 milliGRAM(s)/deciliter  hydrALAZINE Injectable 10 milliGRAM(s) IV Push every 2 hours PRN Sbp >160  labetalol Injectable 10 milliGRAM(s) IV Push every 2 hours PRN SBP>160  levalbuterol Inhalation 0.63 milliGRAM(s) Inhalation every 6 hours PRN SOB/wheezing    Allergies    penicillins (Unknown)    Intolerances      PAST MEDICAL & SURGICAL HISTORY:        REVIEW OF SYSTEMS:  Negative except as noted in HPI      Vital Signs Last 24 Hrs  T(C): 36.6 (05 Jul 2024 15:11), Max: 37.3 (04 Jul 2024 20:00)  T(F): 97.8 (05 Jul 2024 15:11), Max: 99.1 (04 Jul 2024 20:00)  HR: 97 (05 Jul 2024 18:00) (68 - 97)  BP: 149/98 (05 Jul 2024 18:00) (127/95 - 187/96)  BP(mean): 111 (05 Jul 2024 18:00) (90 - 881)  RR: 16 (05 Jul 2024 18:00) (15 - 18)  SpO2: 96% (05 Jul 2024 18:00) (93% - 100%)    Parameters below as of 05 Jul 2024 18:00  Patient On (Oxygen Delivery Method): room air          PHYSICAL EXAMINATION:  GEN: In no apparent distress  HEENT: NC/AT  NECK: Supple, non-tender, no stridor noted  CV: +S1, S2  RESPIRATORY: CTA B/L, good inspiratory effort, non-labored breathing  ABDOMEN: Soft, non-tender  EXTREMITIES: No pedal edema B/L  SKIN: No open wounds  PSYCH: Normal affect      LABS:                        12.7   11.36 )-----------( 345      ( 05 Jul 2024 05:48 )             38.4     07-05    138  |  98  |  23.6<H>  ----------------------------<  120<H>  3.8   |  27.0  |  0.69    Ca    8.7      05 Jul 2024 05:48    TPro  6.2<L>  /  Alb  3.1<L>  /  TBili  0.7  /  DBili  x   /  AST  29  /  ALT  36<H>  /  AlkPhos  72  07-05      Urinalysis Basic - ( 05 Jul 2024 05:48 )    Color: x / Appearance: x / SG: x / pH: x  Gluc: 120 mg/dL / Ketone: x  / Bili: x / Urobili: x   Blood: x / Protein: x / Nitrite: x   Leuk Esterase: x / RBC: x / WBC x   Sq Epi: x / Non Sq Epi: x / Bacteria: x                  Procalcitonin: 0.09 ng/mL (07-02-24 @ 22:13)      MICROBIOLOGY:  Respiratory Viral Panel with COVID-19 by ADDISON (07.01.24 @ 22:56)    Rapid RVP Result: [facility]   SARS-CoV-2: [facility]: This Respiratory Panel uses polymerase chain reaction (PCR) to detect for  adenovirus; coronavirus (HKU1, NL63, 229E, OC43); human metapneumovirus  (hMPV); human enterovirus/rhinovirus (Entero/RV); influenza A; influenza  A/H1; influenza A/H3; influenza A/H1-2009; influenza B; parainfluenza  viruses 1, 2, 3, 4; respiratory syncytial virus; Mycoplasma pneumoniae;  Chlamydophila pneumoniae; and SARS-CoV-2.        RADIOLOGY & ADDITIONAL STUDIES:  < from: MR Head w/wo IV Cont (07.04.24 @ 12:27) >  IMPRESSION:  Small parenchymal hemorrhage peripherally within the right parietal lobe   with mild surrounding edema. No definitive enhancement, underlying   lesion, or associated abnormal flow voids. Please note the presence of   increased T1 signal precontrast somewhat limits evaluation for   enhancement.  Multifocal areas of hemosiderin compatible with prior microhemorrhages.   Multiple chronic lacunar infarcts. Moderate chronic white matter ischemic   changes. Findings likely related to long-standing hypertension.  9 mm enhancing nodule superficial lobe right parotid gland    --- End of Report ---            MICHAEL MANUEL MD; Attending Radiologist  This document has been electronically signed. Jul 4 2024  2:12PM    < end of copied text >      < from: CT Neck Soft Tissue No Cont (07.03.24 @ 11:11) >  IMPRESSION:  Interval extubation.    No significant subglottic airway stenosis.    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    --- End of Report ---            SRI LIANG MD; Attending Radiologist  This document has been electronically signed. Jul 4 2024 10:22AM    < end of copied text >      ECHO:  < from: TTE W or WO Ultrasound Enhancing Agent (06.30.24 @ 08:50) >  _______________________________________________________________________________________     CONCLUSIONS:      1. Left ventricular cavity is normal in size. Left ventricular systolic function is normal with an ejection fraction visually estimated at 60 to 65 %.   2. There is mild (grade 1) left ventricular diastolic dysfunction.   3. Normal right ventricular cavity size and normal right ventricular systolic function.   4. The left atrium is normal in size.   5. The right atrium is normal in size.   6. Structurally normal mitral valve with normal leaflet excursion.   7. No pericardial effusion seen.   8. Mild tricuspid regurgitation.   9. Estimated pulmonary artery systolic pressure is 38 mmHg.  10. Mild left ventricular hypertrophy.  11. Technically difficult image quality.  12. Trileaflet aortic valve with normal systolic excursion. Fibrocalcific aortic valve sclerosis without stenosis.    ________________________________________________________________________________________    < end of copied text >

## 2024-07-05 NOTE — PHYSICAL THERAPY INITIAL EVALUATION ADULT - GAIT PATTERN USED, PT EVAL
decreased gait velocity and activity tolerance, assist for safety, verbal cues for sequencing
3-point gait

## 2024-07-05 NOTE — PHYSICAL THERAPY INITIAL EVALUATION ADULT - CRITERIA FOR SKILLED THERAPEUTIC INTERVENTIONS
impairments found/functional limitations in following categories/rehab potential/therapy frequency/predicted duration of therapy intervention/anticipated discharge recommendation
impairments found/functional limitations in following categories

## 2024-07-05 NOTE — PROGRESS NOTE ADULT - SUBJECTIVE AND OBJECTIVE BOX
Patient fatigued.  Has not really been out of bed.  Daughter at bedside.  Spoke to ALICIA Amin about DC to [place].  Patient will need follow up PT and have requested eval.     FUNCTIONAL PROGRESS  7/5 PT  Bed Mobility: Rolling/Turning:     · Level of [place]	minimum assist (75% patients effort)    Bed Mobility: Sit to Supine:     · Level of [place]	minimum assist (75% patients effort)    Bed Mobility: Supine to Sit:     · Level of [place]	minimum assist (75% patients effort)    Transfer: Sit to Stand:     · Level of [place]	minimum assist (75% patients effort)    Transfer: Stand to Sit:     · Level of [place]	minimum assist (75% patients effort)    Gait Skills:     · Level of [place]	15'x2 RW modA      7/3 SLP  Speech Language Pathology Recommendations: 1. Regular solids/thin liquids as tolerated. 2. Aspiration precautions 3. Crush meds PRN 4. No further follow up; reconsult as needed.     VITALS  T(C): 37.1 (07-05-24 @ 10:00), Max: 37.3 (07-04-24 @ 20:00)  HR: 80 (07-05-24 @ 10:00) (68 - 90)  BP: 127/95 (07-05-24 @ 10:00) (127/95 - 187/96)  RR: 16 (07-05-24 @ 10:00) (16 - 18)  SpO2: 94% (07-05-24 @ 10:00) (93% - 99%)  Wt(kg): --    MEDICATIONS   acetaminophen     Tablet .. 650 milliGRAM(s) every 6 hours PRN  albuterol/ipratropium for Nebulization 3 milliLiter(s) every 6 hours  amLODIPine   Tablet 10 milliGRAM(s) daily  atorvastatin 40 milliGRAM(s) at bedtime  buDESOnide    Inhalation Suspension 0.5 milliGRAM(s) every 12 hours  dextrose 10% Bolus 125 milliLiter(s) once  dextrose 5%. 1000 milliLiter(s) <Continuous>  dextrose 5%. 1000 milliLiter(s) <Continuous>  dextrose 50% Injectable 25 Gram(s) once  dextrose 50% Injectable 12.5 Gram(s) once  dextrose Oral Gel 15 Gram(s) once PRN  furosemide   Injectable 40 milliGRAM(s) daily  glucagon  Injectable 1 milliGRAM(s) once  hydrALAZINE Injectable 10 milliGRAM(s) every 2 hours PRN  insulin lispro (ADMELOG) corrective regimen sliding scale   at bedtime  insulin lispro (ADMELOG) corrective regimen sliding scale   three times a day before meals  labetalol Injectable 10 milliGRAM(s) every 2 hours PRN  levalbuterol Inhalation 0.63 milliGRAM(s) every 6 hours PRN  levETIRAcetam   Injectable 1000 milliGRAM(s) every 12 hours  lidocaine   4% Patch 1 Patch every 24 hours  losartan 100 milliGRAM(s) daily  methylPREDNISolone sodium succinate Injectable 40 milliGRAM(s) every 8 hours   SLPmetoprolol tartrate 50 milliGRAM(s) two times a day  montelukast 10 milliGRAM(s) daily  pantoprazole    Tablet 40 milliGRAM(s) before breakfast  polyethylene glycol 3350 17 Gram(s) daily  senna Syrup 10 milliLiter(s) at bedtime  sertraline 100 milliGRAM(s) daily  spironolactone 50 milliGRAM(s) daily      RECENT LABS/IMAGING  - Reviewed Today                        12.7   11.36 )-----------( 345      ( 05 Jul 2024 05:48 )             38.4     07-05    138  |  98  |  23.6<H>  ----------------------------<  120<H>  3.8   |  27.0  |  0.69    Ca    8.7      05 Jul 2024 05:48    TPro  6.2<L>  /  Alb  3.1<L>  /  TBili  0.7  /  DBili  x   /  AST  29  /  ALT  36<H>  /  AlkPhos  72  07-05      Urinalysis Basic - ( 05 Jul 2024 05:48 )    Color: x / Appearance: x / SG: x / pH: x  Gluc: 120 mg/dL / Ketone: x  / Bili: x / Urobili: x   Blood: x / Protein: x / Nitrite: x   Leuk Esterase: x / RBC: x / WBC x   Sq Epi: x / Non Sq Epi: x / Bacteria: x                HEAD CT, CTA HEAD & NECK 6/29 - Age-appropriate involutional and ischemic gliotic changes. No change in right parietal parenchymal hemorrhage since 7:42 AM. Normal CTA of the head and neck.    HEAD CT 6/30 - No change since 6/29/2024. Right small right parietal cortical parenchymal hemorrhage.    CXR 7/2 - Clear lungs on the latest film.    MRI HEAD 7/4 - Small parenchymal hemorrhage peripherally within the right parietal lobe with mild surrounding edema. No definitive enhancement, underlying lesion, or associated abnormal flow voids. Please note the presence of increased T1 signal precontrast somewhat limits evaluation for enhancement.Multifocal areas of hemosiderin compatible with prior microhemorrhages. Multiple chronic lacunar infarcts. Moderate chronic white matter ischemic changes. Findings likely related to long-standing hypertension.9 mm enhancing nodule superficial lobe right parotid gland  ----------------------------------------------------------------------------------------  PHYSICAL EXAM  Constitutional - NAD, Comfortable   Extremities - No calf tenderness   Neurologic Exam -                    Cognitive - AAO to self, place, date, year, situation     Communication - Fluent, Mild dysarthria     Cranial Nerves - CN 2-12 intact     FUNCTIONAL MOTOR EXAM - Mild deficits of the left UE      Sensory - Intact to LT   Psychiatric - Fatigued  ----------------------------------------------------------------------------------------  ASSESSMENT/PLAN  79yFemale with functional deficits after developing an IPH  Right parietal IPH - Stable, Convert Keppra IV to PO  Acute CHF/Uncontrolled HTN - Norvasc, Aldactone, Cozaar, Lopressor, Recommend taper Hydralazine IV & Labetalol IV  CAD - Lipitor   Pulm - Duoneb, Pulmicort, Lasix, Taper off Solumedrol IV, Xopenex  Mood - Zoloft  Pain - Tylenol  DVT PPX - SCDs  Rehab/Impaired mobility and function - Patient continues to require hospitalization for the above diagnoses and ongoing active management of comorbid complications (patient with acute onset CHF with IV meds to manage pulmonary status, also with SBP in high 180s requiring IV meds, ) that are substantially posing a threat to bodily function, functional ability and quality of life.     RECOMMEND - OOB daily     When medically optimized, based on the patient's diagnosis, current functional status and potential for progress, recommend ACUTE inpatient rehabilitation for the functional deficits consisting of 3 hours of therapy/day & 24 hour RN/daily PMR physician for active comorbid medical management. Patient will be able to tolerate 3 hours a day.     Will continue to follow. Rehab recommendations are dependent on how functional progress changes as well as how patient continues to participate and tolerate therapeutic interventions, WHICH MAY CHANGE UPON FOLLOW UP EXAMINATIONS. Recommend ongoing mobilization by staff to maintain cardiopulmonary function and prevention of secondary complications related to debility/immobility. Discussed the specific management and recommendations above with rehab clinical care team/rehab liaison.

## 2024-07-05 NOTE — PHYSICAL THERAPY INITIAL EVALUATION ADULT - LEVEL OF INDEPENDENCE: SIT/STAND, REHAB EVAL
+ pushing to the right side/moderate assist (50% patients effort)
minimum assist (75% patients effort)

## 2024-07-05 NOTE — PHYSICAL THERAPY INITIAL EVALUATION ADULT - PERTINENT HX OF CURRENT PROBLEM, REHAB EVAL
s/p right parietal IPH, stridor, re-evaluated due to transferring to SDU on 7/2
79F with MHx significant for HTN, HLD and CAD s/p stent who takes ASA presented to [facility] this morning with AMS and urinary incontinence and her last known well approximately 530am. CTH at [place] revealed R parietal lobe hematoma. Patient was transferred to [facility] for further management. Of note, patient recieved 3mg ativan, haldol and 1g keppra prior to arrival at [facility] ED for both agitation and potential seizures. Once in ED, ED providers concerned for patient's airway as she was gargling with respirations. Decision made by ED staff to intubate patient for airway protection

## 2024-07-05 NOTE — PROGRESS NOTE ADULT - ASSESSMENT
ASSESSMENT:  80 y/o Female with PMHx significant for HTN, HLD and CAD s/p stent who takes ASA presented to [facility] the morning of 6/29/24 with AMS and urinary incontinence and her last known well approximately 530am. CTH at [place] revealed Right parietal lobe hematoma with surrounding vasogenic edema. Patient was transferred to [facility] for further management. Of note, patient recieved 3mg ativan, haldol and 1g keppra prior to arrival at [facility] ED for both agitation and potential seizures. Once in ED, patient was intubated for airway protection.     Patient extubated 6/30/24.  7/2/24 patient with worsening respiratory distress and bronchospasm, made NPO, on continuous BiPAP, ?CHF exacerbation 2/2 HFpEF? ENT scope with evidence of possible subglottic stenosis Pulm consulted, MICU consulted -- upgraded to Stepdown w/ VS q 2 hours and stroke neuro checks q 2 hours. Patient transferred to Medicine service under Hospitalist Dr. Gege Torre.    Patient now off BiPAP, tolerating NC, ongoing assessment by ENT and Pulm.    Etiology of hemorrhage possibly amyloid angiopathy  MRI brain showing small parenchymal hemorrhage right parietal lobe with mild surrounding edema. No underlying lesion. Multifocal areas of hemosiderin compatible with prior microhemorrhages.   Multiple chronic lacunar infarcts.   Hypertension likely 2/2 seizure, patient arrived from [place] on Cardene gtt.  Per initial triage note at [place], at home patient became unresponsive, foaming at mouth, clenching jaw, snoring respirations with urinary incontinence and tongue biting.  Likely new-onset seizures in the setting of ICH.     NEURO:   #Right parietal lobe hematoma with surrounding vasogenic edema   #New onset seizures  -Neurologically patient stable   -Continue close monitoring for neurological deterioration  -May downgrade to q4 stroke neuro checks  -SBP Goal 100-160 mmHg, avoiding rapid fluctuations and hypotension  -ANTITHROMBOTIC THERAPY: On hold, ICH -- patient on ASA at home (hx CAD)  -STATIN THERAPY: On hold due to NPO status -- can resume Atorvastatin 40mg PO daily when clinically appropriate (home med therapeutic interchange)  -AED: Keppra 1000mg PO BID   -Seizure precautions  -Dysphagia Screening: PASS  -LDL 47, titrate statin to LDL goal less than 70  -A1C 5.8  -Physical therapy/OT/Speech eval/treatment.   - -->7067 -->7450  -TTE as noted: EF 60-65%  -Troponin downtrending, Trop I 308.6 --> Trop T 55, ?demand ischemia  -DVT ppx: Lovenox  -Stroke education    OTHER: Condition and plan of care d/w patient and family at bedside, questions and concerns addressed.     DISPOSITION: Rehab or home depending on PT eval once stable and workup is complete    CORE MEASURES:        Admission NIHSS: 2     Tenecteplase : [] YES [x] NO      LDL/A1C: 47/5.8     Depression Screen- if depression hx and/or present      Statin Therapy: Atorvastatin 40mg     Dysphagia Screen: [x] PASS [] FAIL     Smoking [] YES [x] NO      Afib [] YES [x] NO     Stroke Education [x] YES [] NO   ASSESSMENT:  80 y/o Female with PMHx significant for HTN, HLD and CAD s/p stent who takes ASA presented to [facility] the morning of 6/29/24 with AMS and urinary incontinence and her last known well approximately 530am. CTH at [place] revealed Right parietal lobe hematoma with surrounding vasogenic edema. Patient was transferred to [facility] for further management. Of note, patient recieved 3mg ativan, haldol and 1g keppra prior to arrival at [facility] ED for both agitation and potential seizures. Once in ED, patient was intubated for airway protection.     Patient extubated 6/30/24.  7/2/24 patient with worsening respiratory distress and bronchospasm, made NPO, on continuous BiPAP, ?CHF exacerbation 2/2 HFpEF? ENT scope with evidence of possible subglottic stenosis Pulm consulted, MICU consulted -- upgraded to Stepdown w/ VS q 2 hours and stroke neuro checks q 2 hours. Patient transferred to Medicine service under Hospitalist Dr. Gege Torre.    Patient now off BiPAP, tolerating NC, ongoing assessment by ENT and Pulm.    Etiology of hemorrhage possibly hypertensive, but cannot rule out amyloid angiopathy given previous areas of microhemorrhage.  MRI brain showing small parenchymal hemorrhage right parietal lobe with mild surrounding edema. No underlying lesion. Multifocal areas of hemosiderin compatible with prior microhemorrhages.   Multiple chronic lacunar infarcts.   Hypertension likely 2/2 seizure, patient arrived from [place] on Cardene gtt.  Per initial triage note at [place], at home patient became unresponsive, foaming at mouth, clenching jaw, snoring respirations with urinary incontinence and tongue biting.  Likely new-onset seizures in the setting of ICH.     NEURO:   #Right parietal lobe hematoma with surrounding vasogenic edema   #New onset seizures  -Neurologically patient stable   -Continue close monitoring for neurological deterioration  -May downgrade to q4 stroke neuro checks  -SBP Goal 100-160 mmHg, avoiding rapid fluctuations and hypotension  -ANTITHROMBOTIC THERAPY: On hold, ICH -- patient on ASA at home (hx CAD). Can resume tomorrow provided no change in status and BP is well controlled.  -AED: Keppra 1000mg PO BID   -Seizure precautions  -Dysphagia Screening: PASS  -LDL 47, titrate statin to LDL goal less than 70 - Continues on Atorvastatin 40mg PO daily  -A1C 5.8  -Physical therapy/OT/Speech eval/treatment.   - -->7067 -->3840  -TTE as noted: EF 60-65%  -Troponin downtrending, Trop I 308.6 --> Trop T 55, ?demand ischemia  -DVT ppx: Lovenox  -Stroke education    OTHER: Condition and plan of care d/w patient and family at bedside, questions and concerns addressed.   -Will sign off. Please reconsult with any new or worsening neurologic concerns.    DISPOSITION: Rehab or home depending on PT eval once stable and workup is complete    CORE MEASURES:        Admission NIHSS: 2     Tenecteplase : [] YES [x] NO      LDL/A1C: 47/5.8     Depression Screen- if depression hx and/or present      Statin Therapy: Atorvastatin 40mg     Dysphagia Screen: [x] PASS [] FAIL     Smoking [] YES [x] NO      Afib [] YES [x] NO     Stroke Education [x] YES [] NO   ASSESSMENT:  80 y/o Female with PMHx significant for HTN, HLD and CAD s/p stent on ASA presented to [facility]  6/29/24 with AMS and urinary incontinence, an found to have Right parietal lobe hematoma with surrounding vasogenic edema. Patient was transferred to [facility] for further management. Of note, patient recieved 3mg ativan, haldol and 1g keppra prior to arrival at [facility] ED for both agitation and potential seizures. Eventually extubated. Hospital course c/b worsening respiratory distress and bronchospasmENT scope with evidence of possible subglottic stenosis Pulm consulted, MICU consulted -- upgraded to Stepdown w/ VS q 2 hours and stroke neuro checks q 2 hours. Patient transferred to Medicine service under Hospitalist Dr. Gege Torre.    Patient now off BiPAP, tolerating NC, ongoing assessment by ENT and Pulm.    Etiology of hemorrhage possibly hypertensive, but cannot rule out amyloid angiopathy given previous areas of microhemorrhages noted in both cortical and subcortical region.    --MRI brain showing small parenchymal hemorrhage right parietal lobe with mild surrounding edema. No underlying lesion. Multifocal areas of hemosiderin compatible with prior microhemorrhages. Multiple chronic lacunar infarcts.   --Likely new-onset seizures in the setting of ICH.     NEURO:   #Right parietal lobe hematoma with surrounding vasogenic edema   #New onset seizures  -Neurologically patient stable   -Continue close monitoring for neurological deterioration  -May downgrade to q4 stroke neuro checks  -SBP Goal 100-160 mmHg, avoiding rapid fluctuations and hypotension  -ANTITHROMBOTIC THERAPY: On hold, ICH -- patient on ASA at home (hx CAD). Can resume tomorrow provided no change in status and BP is well controlled.  -AED: Keppra 1000mg PO BID--continue this at discharge. Please advise of no driving.    -Seizure precautions  -Dysphagia Screening: PASS  -LDL 47, titrate statin to LDL goal less than 70 - Continues on Atorvastatin 40mg PO daily  -A1C 5.8  -Physical therapy/OT/Speech eval/treatment.   - -->7067 -->3840  -TTE as noted: EF 60-65%  -Troponin downtrending, Trop I 308.6 --> Trop T 55, ?demand ischemia  -DVT ppx: Lovenox  -Stroke education    OTHER: Condition and plan of care d/w patient and family at bedside, questions and concerns addressed.   -Will sign off. Please reconsult with any new or worsening neurologic concerns.    DISPOSITION: Rehab or home depending on PT eval once stable and workup is complete    CORE MEASURES:        Admission NIHSS: 2     Tenecteplase : [] YES [x] NO      LDL/A1C: 47/5.8     Depression Screen- if depression hx and/or present      Statin Therapy: Atorvastatin 40mg     Dysphagia Screen: [x] PASS [] FAIL     Smoking [] YES [x] NO      Afib [] YES [x] NO     Stroke Education [x] YES [] NO

## 2024-07-05 NOTE — PHYSICAL THERAPY INITIAL EVALUATION ADULT - RANGE OF MOTION EXAMINATION, REHAB EVAL
bilateral lower extremity was ROM was WNL (within normal limits)
except left hip lacks 50% AROM/no ROM deficits were identified

## 2024-07-05 NOTE — PROGRESS NOTE ADULT - ASSESSMENT
ICH  HTN  Stridor - resolved  Mass effect of brachiocephalic artery    Recommendations:  - BP control  - Should f/u with ENT as outpatient  - Consider vascular f/u re: mass effect of brachiocephalic artery  - Elevated BP may have caused dilatation of brachiocephalic artery, which could have resulted in a subglottic stenosis from mass effect  - Lungs otherwise clear on exam      Eugene Figueroa MD, [facility]  , Pulmonary & Critical Care Medicine  [facility]  Pulmonary and Sleep Medicine at [place]  [address]  [place] [address]  T: [phone]  F: [phone]

## 2024-07-05 NOTE — CHART NOTE - NSCHARTNOTEFT_GEN_A_CORE
< from: CT Neck Soft Tissue No Cont (07.03.24 @ 11:11) >      INTERPRETATION:  CT EXAMINATION OF THE NECK    CLINICAL INDICATION: Subglottic stenosis.    TECHNIQUE: Multidetector reformatted CT images of the neck were obtained   without contrast administration.    COMPARISON: CTA neck 6/29/24.    FINDINGS: Limited evaluation without the use of IV contrast. Within these   ligaments:    Aerodigestive structures: Interval extubation. There is no significant   subglottic airway stenosis. There is tortuosity of the upper trachea   secondary to mass effect from the brachiocephalic artery, posteriorly   deviating the trachea. There is minimal luminal narrowing with a minimum   diameter of 0.8 cm.    Partially visualized debris/secretions within the mid thoracic trachea.  Lymph nodes: No pathologic adenopathy by imaging size criteria.  Parotid and submandibular glands:  Normal.  Thyroid gland: Normal.  Partially visualized orbits: Normal  Partially visualized intracranial structures: Normal.  Paranasal sinuses: Clear.  Mastoid air cells:  Clear.  Dentition: Normal.  Partially visualized lung apices: Normal.  Osseous structures: No fracture, dislocation or destructive lesion.      IMPRESSION:  Interval extubation.    No significant subglottic airway stenosis.    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  ____________________________________________________________________________________________________________________________________________________________________________________________________________________________________________    A/P:  No subglottic stenosis seen on CT as above  Additionally, pts stridor recovered well with steroids, neb treatments and trial of BiPAP   Minimal luminal narrowing of upper trachea 2/2 brachiocephalic artery- not an acute finding or a result form recent intubation   No ent intervention at this time. Please reconsult as needed

## 2024-07-05 NOTE — PHYSICAL THERAPY INITIAL EVALUATION ADULT - IMPAIRMENTS FOUND, PT EVAL
aerobic capacity/endurance/gait, locomotion, and balance/muscle strength
gait, locomotion, and balance/muscle strength/ROM

## 2024-07-05 NOTE — PROGRESS NOTE ADULT - ASSESSMENT
79F with MHx significant for HTN, HLD and CAD s/p stent who takes ASA presented to [facility] this morning with AMS and urinary incontinence and her last known well approximately 530am. CTH at [place] revealed R parietal lobe hematoma    #R Parietal lobe hematoma surrounding vasogenic edema  #New onset seizures  Transferred from [facility]  - MRI Head w/wo reviewed  - Hold AC (on ASA pta for CAD). Discussed with neurology, will hold ASA and monitor BP. Restart if SBP< 160s for 24 hours  Atorvastatin when able to tolerate PO  KEPPRA 1000mg BID  Seizure precautions  Dysphagia  - CT neck w/o significant stenosis    #Stridor  S/p intubation for 22hrs, now Bipap  S/p laryngoscopy w concern for subglottic stenosis  Heliox therapy, racemic epi  Steroids- solumedrol 40mg IV q6  ENT eval noted  Consider inhaled corticosteroids  Pulm following    #Allergies/Resp distress  Singulair  Pulmicort  Xopenex  Duoneb q4 PRN  Does not seem fluid overloaded, ProBNP approx 7k, Echo 6/30 shows preserved   EF, mild diastolic dysfxn  ABG noted, mild hypoxia  Follow up cardio recs noted discussed with Pulm.   CXR clear.   - Appreciate pulm recs- switched solu-medrol to prednisone    #HTN, HLD and CAD s/p stent  #HFpEF  Losartan  Lopressor 25 BID  Hydralazine PRN  Labetalol PRN  Statin  ASA on hold 2/2 bleed  - Appreciate cards recs- will c/w IV lasix QD  - Added hydralazine 10 TID    #Troponemia  Likely demand from above, trended down and remained flat  No chest pain or ischemic ekg changes    #Psych  Zoloft    #GERD  Pronotix    DVT ppx:   Lovenox    S/S eval- Regular diet  PT- Acute rehab    Updated patient's family on 7/5   79F with MHx significant for HTN, HLD and CAD s/p stent who takes ASA presented to [facility] this morning with AMS and urinary incontinence and her last known well approximately 530am. CTH at [place] revealed R parietal lobe hematoma    #R Parietal lobe hematoma surrounding vasogenic edema  #New onset seizures  Transferred from [facility]  - MRI Head w/wo reviewed  - Hold AC (on ASA pta for CAD). Discussed with neurology, will hold ASA and monitor BP. Restart if SBP< 160s for 24 hours  Atorvastatin when able to tolerate PO  KEPPRA 1000mg BID  Seizure precautions  Dysphagia  - CT neck w/o significant stenosis    #Stridor  S/p intubation for 22hrs, now Bipap  S/p laryngoscopy w concern for subglottic stenosis  Heliox therapy, racemic epi  Steroids- solumedrol 40mg IV q6  ENT eval noted  Consider inhaled corticosteroids  Pulm following    #Allergies/Resp distress  Singulair  Pulmicort  Xopenex  Duoneb q4 PRN  Does not seem fluid overloaded, ProBNP approx 7k, Echo 6/30 shows preserved   EF, mild diastolic dysfxn  ABG noted, mild hypoxia  Follow up cardio recs noted discussed with Pulm.   CXR clear.   - Appreciate pulm recs- switched solu-medrol to prednisone    #HTN, HLD and CAD s/p stent  #HFpEF  Losartan  Lopressor 25 BID  Hydralazine PRN  Labetalol PRN  Statin  ASA on hold 2/2 bleed  - Appreciate cards recs- will c/w PO lasix QD  - Added hydralazine 10 TID    #Troponemia  Likely demand from above, trended down and remained flat  No chest pain or ischemic ekg changes    #Psych  Zoloft    #GERD  Pronotix    DVT ppx:   Lovenox    S/S eval- Regular diet  PT- Acute rehab    Updated patient's family on 7/5

## 2024-07-05 NOTE — PROGRESS NOTE ADULT - SUBJECTIVE AND OBJECTIVE BOX
[facility]                                                         CARDIOLOGY AT [place] [address]                                                         Telephone: [phone]. Fax:[phone]                                                                             PROGRESS NOTE    Reason for follow up: ischemic workup   Overall Plan: currently not candidate for ischemic workup due to cerebral hemorrhage, MRI is pending to elucidate etiology of hemorrhage     Review of symptoms:   Cardiac:  No chest pain. No dyspnea. No palpitations.  Respiratory: no cough. No dyspnea  Gastrointestinal: No diarrhea. No abdominal pain. No bleeding.   Neuro: No focal neuro complaints.    Vitals:  T(C): 37.1 (07-05-24 @ 10:00), Max: 37.3 (07-04-24 @ 20:00)  HR: 86 (07-05-24 @ 12:00) (68 - 90)  BP: 140/88 (07-05-24 @ 12:00) (127/95 - 187/96)  RR: 16 (07-05-24 @ 12:00) (16 - 18)  SpO2: 100% (07-05-24 @ 12:00) (93% - 100%)  Wt(kg): --  I&O's Summary    04 Jul 2024 07:01  -  05 Jul 2024 07:00  --------------------------------------------------------  IN: 560 mL / OUT: 2100 mL / NET: -1540 mL    05 Jul 2024 07:01  -  05 Jul 2024 12:50  --------------------------------------------------------  IN: 0 mL / OUT: 800 mL / NET: -800 mL    PHYSICAL EXAM:  Appearance: Comfortable. No acute distress  HEENT:  Atraumatic. Normocephalic.  Normal oral mucosa  Neurologic: A & O x 3, no gross focal deficits.  Cardiovascular: RRR S1 S2, No murmur, no rubs/gallops. No JVD  Respiratory: Lungs clear to auscultation, unlabored   Gastrointestinal:  Soft, Non-tender, + BS  Lower Extremities: 2+ Peripheral Pulses, No clubbing, cyanosis, or edema  Psychiatry: Patient is calm. No agitation.   Skin: warm and dry.    CURRENT CARDIAC MEDICATIONS:  amLODIPine   Tablet 10 milliGRAM(s) Oral daily  furosemide   Injectable 40 milliGRAM(s) IV Push daily  hydrALAZINE Injectable 10 milliGRAM(s) IV Push every 2 hours PRN  labetalol Injectable 10 milliGRAM(s) IV Push every 2 hours PRN  losartan 100 milliGRAM(s) Oral daily  metoprolol tartrate 50 milliGRAM(s) Oral two times a day  spironolactone 50 milliGRAM(s) Oral daily      CURRENT OTHER MEDICATIONS:  albuterol/ipratropium for Nebulization 3 milliLiter(s) Nebulizer every 6 hours  buDESOnide    Inhalation Suspension 0.5 milliGRAM(s) Inhalation every 12 hours  levalbuterol Inhalation 0.63 milliGRAM(s) Inhalation every 6 hours PRN SOB/wheezing  montelukast 10 milliGRAM(s) Oral daily  acetaminophen     Tablet .. 650 milliGRAM(s) Oral every 6 hours PRN Temp greater or equal to 38C (100.4F), Mild Pain (1 - 3)  levETIRAcetam   Injectable 1000 milliGRAM(s) IV Push every 12 hours  sertraline 100 milliGRAM(s) Oral daily  pantoprazole    Tablet 40 milliGRAM(s) Oral before breakfast, Stop order after: 5 Days  polyethylene glycol 3350 17 Gram(s) Oral daily  senna Syrup 10 milliLiter(s) Oral at bedtime  atorvastatin 40 milliGRAM(s) Oral at bedtime  dextrose 50% Injectable 12.5 Gram(s) IV Push once, Stop order after: 1 Doses  dextrose 50% Injectable 25 Gram(s) IV Push once, Stop order after: 1 Doses  dextrose Oral Gel 15 Gram(s) Oral once, Stop order after: 1 Doses PRN Blood Glucose LESS THAN 70 milliGRAM(s)/deciliter  glucagon  Injectable 1 milliGRAM(s) IntraMuscular once, Stop order after: 1 Doses  insulin lispro (ADMELOG) corrective regimen sliding scale   SubCutaneous at bedtime  insulin lispro (ADMELOG) corrective regimen sliding scale   SubCutaneous three times a day before meals  methylPREDNISolone sodium succinate Injectable 40 milliGRAM(s) IV Push every 8 hours  dextrose 10% Bolus 125 milliLiter(s) IV Bolus once, Stop order after: 1 Doses  dextrose 5%. 1000 milliLiter(s) (50 mL/Hr) IV Continuous <Continuous>  dextrose 5%. 1000 milliLiter(s) (100 mL/Hr) IV Continuous <Continuous>  enoxaparin Injectable 40 milliGRAM(s) SubCutaneous every 24 hours  lidocaine   4% Patch 1 Patch Transdermal every 24 hours      LABS:	 	  ( 02 Jul 2024 22:13 )  Troponin T  X    ,  CPK  614<H>, CKMB  X    , BNP X        , ( 29 Jun 2024 07:15 )  Troponin T  X    ,  CPK  104  , CKMB  X    , BNP X                              12.7   11.36 )-----------( 345      ( 05 Jul 2024 05:48 )             38.4     07-05    138  |  98  |  23.6<H>  ----------------------------<  120<H>  3.8   |  27.0  |  0.69    Ca    8.7      05 Jul 2024 05:48    TPro  6.2<L>  /  Alb  3.1<L>  /  TBili  0.7  /  DBili  x   /  AST  29  /  ALT  36<H>  /  AlkPhos  72  07-05    PT/INR/PTT ( 29 Jun 2024 12:07 )                       :                       :      11.8         :       32.9                  .        .                   .              .           .       1.07        .                                       Lipid Profile: Date: 06-29 @ 14:50  Total cholesterol 128; Direct LDL: --; HDL: 60; Triglycerides:105    HgA1c:   TSH: Thyroid Stimulating Hormone, Serum: 2.35 uIU/mL

## 2024-07-05 NOTE — PROGRESS NOTE ADULT - SUBJECTIVE AND OBJECTIVE BOX
Patient is a 79y old  Female who presents with a chief complaint of IPH ?seizure (05 Jul 2024 12:49)    INTERVAL HPI/OVERNIGHT EVENTS: No acute events overnight. Patient hypertensive to 160s-170s and continues to require NC 2L.     MEDICATIONS  (STANDING):  albuterol/ipratropium for Nebulization 3 milliLiter(s) Nebulizer every 6 hours  amLODIPine   Tablet 10 milliGRAM(s) Oral daily  atorvastatin 40 milliGRAM(s) Oral at bedtime  buDESOnide    Inhalation Suspension 0.5 milliGRAM(s) Inhalation every 12 hours  dextrose 10% Bolus 125 milliLiter(s) IV Bolus once  dextrose 5%. 1000 milliLiter(s) (50 mL/Hr) IV Continuous <Continuous>  dextrose 5%. 1000 milliLiter(s) (100 mL/Hr) IV Continuous <Continuous>  dextrose 50% Injectable 25 Gram(s) IV Push once  dextrose 50% Injectable 12.5 Gram(s) IV Push once  enoxaparin Injectable 40 milliGRAM(s) SubCutaneous every 24 hours  furosemide   Injectable 40 milliGRAM(s) IV Push daily  glucagon  Injectable 1 milliGRAM(s) IntraMuscular once  insulin lispro (ADMELOG) corrective regimen sliding scale   SubCutaneous three times a day before meals  insulin lispro (ADMELOG) corrective regimen sliding scale   SubCutaneous at bedtime  levETIRAcetam   Injectable 1000 milliGRAM(s) IV Push every 12 hours  lidocaine   4% Patch 1 Patch Transdermal every 24 hours  losartan 100 milliGRAM(s) Oral daily  methylPREDNISolone sodium succinate Injectable 40 milliGRAM(s) IV Push every 8 hours  metoprolol tartrate 50 milliGRAM(s) Oral two times a day  montelukast 10 milliGRAM(s) Oral daily  pantoprazole    Tablet 40 milliGRAM(s) Oral before breakfast  polyethylene glycol 3350 17 Gram(s) Oral daily  senna Syrup 10 milliLiter(s) Oral at bedtime  sertraline 100 milliGRAM(s) Oral daily  spironolactone 50 milliGRAM(s) Oral daily    MEDICATIONS  (PRN):  acetaminophen     Tablet .. 650 milliGRAM(s) Oral every 6 hours PRN Temp greater or equal to 38C (100.4F), Mild Pain (1 - 3)  dextrose Oral Gel 15 Gram(s) Oral once PRN Blood Glucose LESS THAN 70 milliGRAM(s)/deciliter  hydrALAZINE Injectable 10 milliGRAM(s) IV Push every 2 hours PRN Sbp >160  labetalol Injectable 10 milliGRAM(s) IV Push every 2 hours PRN SBP>160  levalbuterol Inhalation 0.63 milliGRAM(s) Inhalation every 6 hours PRN SOB/wheezing      Allergies    penicillins (Unknown)    Intolerances        REVIEW OF SYSTEMS: all negative with exception of above    Vital Signs Last 24 Hrs  T(C): 37.1 (05 Jul 2024 10:00), Max: 37.3 (04 Jul 2024 20:00)  T(F): 98.8 (05 Jul 2024 10:00), Max: 99.1 (04 Jul 2024 20:00)  HR: 86 (05 Jul 2024 12:00) (68 - 90)  BP: 140/88 (05 Jul 2024 12:00) (127/95 - 187/96)  BP(mean): 99 (05 Jul 2024 12:00) (90 - 881)  RR: 16 (05 Jul 2024 12:00) (16 - 18)  SpO2: 100% (05 Jul 2024 12:00) (93% - 100%)    Parameters below as of 05 Jul 2024 12:00  Patient On (Oxygen Delivery Method): nasal cannula  O2 Flow (L/min): 2      PHYSICAL EXAM:  GENERAL: NAD, well-groomed  NERVOUS SYSTEM:  Alert & Oriented X3, Good concentration; Motor Strength 5/5 B/L upper and lower extremities; DTRs 2+ intact and symmetric  CHEST/LUNG: Clear to percussion bilaterally; No rales, rhonchi, wheezing, or rubs  HEART: Regular rate and rhythm; No murmurs, rubs, or gallops  ABDOMEN: Soft, Nontender, Nondistended; Bowel sounds present  EXTREMITIES:  2+ Peripheral Pulses, No clubbing, cyanosis, or edema    LABS:                        12.7   11.36 )-----------( 345      ( 05 Jul 2024 05:48 )             38.4     07-05    138  |  98  |  23.6<H>  ----------------------------<  120<H>  3.8   |  27.0  |  0.69    Ca    8.7      05 Jul 2024 05:48    TPro  6.2<L>  /  Alb  3.1<L>  /  TBili  0.7  /  DBili  x   /  AST  29  /  ALT  36<H>  /  AlkPhos  72  07-05      Urinalysis Basic - ( 05 Jul 2024 05:48 )    Color: x / Appearance: x / SG: x / pH: x  Gluc: 120 mg/dL / Ketone: x  / Bili: x / Urobili: x   Blood: x / Protein: x / Nitrite: x   Leuk Esterase: x / RBC: x / WBC x   Sq Epi: x / Non Sq Epi: x / Bacteria: x      CAPILLARY BLOOD GLUCOSE      POCT Blood Glucose.: 124 mg/dL (05 Jul 2024 13:15)  POCT Blood Glucose.: 116 mg/dL (05 Jul 2024 08:11)  POCT Blood Glucose.: 133 mg/dL (04 Jul 2024 22:14)  POCT Blood Glucose.: 125 mg/dL (04 Jul 2024 17:58)      RADIOLOGY & ADDITIONAL TESTS:    Imaging Personally Reviewed:  [ ] YES  [ ] NO  < from: MR Head w/wo IV Cont (07.04.24 @ 12:27) >    IMPRESSION:  Small parenchymal hemorrhage peripherally within the right parietal lobe   with mild surrounding edema. No definitive enhancement, underlying   lesion, or associated abnormal flow voids. Please note the presence of   increased T1 signal precontrast somewhat limits evaluation for   enhancement.  Multifocal areas of hemosiderin compatible with prior microhemorrhages.   Multiple chronic lacunar infarcts. Moderate chronic white matter ischemic   changes. Findings likely related to long-standing hypertension.  9 mm enhancing nodule superficial lobe right parotid gland    --- End of Report ---      MICHAEL MANUEL MD; Attending Radiologist  This document has been electronically signed. Jul 4 2024  2:12PM    < end of copied text >        Consultant(s) Notes Reviewed:  [ ] YES  [ ] NO    Care Discussed with Consultants/Other Providers [ ] YES  [ ] NO

## 2024-07-05 NOTE — PROGRESS NOTE ADULT - ASSESSMENT
This is a 78 y/o with hx of HTN, HLD, CAD s/p stent in 2000 (family unsure as to what type of stent it is), takes ASA and Crestor presented to [facility] with AMS and urinary incontinence where LKW was 5:30 AM prior to arrival. CTH in [place] revealed R parietal lobe hematoma. Pt transferred to [facility] for further management. Once arrival in [facility] ED, patient was gargling with respirations, so pt was intubated for airway protection. Cardiology consulted for elevated troponin.

## 2024-07-05 NOTE — PROGRESS NOTE ADULT - SUBJECTIVE AND OBJECTIVE BOX
Preliminary note, official recommendations pending attending review/signature   Seen and examined by Stroke team attending/team, assessment/ plan as discussed with stroke team attending/team as noted.     [facility] Stroke Team  Progress Note     HPI:  78 y/o Female with PMHx significant for HTN, HLD and CAD s/p stent who takes ASA presented to [facility] the morning of 6/29/24 with AMS and urinary incontinence and her last known well approximately 530am. CTH at [place] revealed R parietal lobe hematoma. Patient was transferred to [facility] for further management. Of note, patient recieved 3mg ativan, haldol and 1g keppra prior to arrival at [facility] ED for both agitation and potential seizures. Once in ED, ED providers concerned for patient's airway as she was gargling with respirations. Decision made by ED staff to intubate patient for airway protection. ROS unable to be obtained as patient was in distress altered on exam.   GCS: 12 (E3, V4, M5) prior to intubation, ICH: 1, mRS: 0    SUBJECTIVE: No events overnight. No new neurologic complaints. ROS reported negative unless otherwise noted.    acetaminophen     Tablet .. 650 milliGRAM(s) Oral every 6 hours PRN  albuterol/ipratropium for Nebulization 3 milliLiter(s) Nebulizer every 6 hours  amLODIPine   Tablet 10 milliGRAM(s) Oral daily  atorvastatin 40 milliGRAM(s) Oral at bedtime  buDESOnide    Inhalation Suspension 0.5 milliGRAM(s) Inhalation every 12 hours  dextrose 10% Bolus 125 milliLiter(s) IV Bolus once  dextrose 5%. 1000 milliLiter(s) IV Continuous <Continuous>  dextrose 5%. 1000 milliLiter(s) IV Continuous <Continuous>  dextrose 50% Injectable 25 Gram(s) IV Push once  dextrose 50% Injectable 12.5 Gram(s) IV Push once  dextrose Oral Gel 15 Gram(s) Oral once PRN  furosemide   Injectable 40 milliGRAM(s) IV Push daily  glucagon  Injectable 1 milliGRAM(s) IntraMuscular once  hydrALAZINE Injectable 10 milliGRAM(s) IV Push every 2 hours PRN  insulin lispro (ADMELOG) corrective regimen sliding scale   SubCutaneous three times a day before meals  insulin lispro (ADMELOG) corrective regimen sliding scale   SubCutaneous at bedtime  labetalol Injectable 10 milliGRAM(s) IV Push every 2 hours PRN  levalbuterol Inhalation 0.63 milliGRAM(s) Inhalation every 6 hours PRN  levETIRAcetam   Injectable 1000 milliGRAM(s) IV Push every 12 hours  lidocaine   4% Patch 1 Patch Transdermal every 24 hours  losartan 100 milliGRAM(s) Oral daily  methylPREDNISolone sodium succinate Injectable 40 milliGRAM(s) IV Push every 8 hours  metoprolol tartrate 50 milliGRAM(s) Oral two times a day  montelukast 10 milliGRAM(s) Oral daily  pantoprazole    Tablet 40 milliGRAM(s) Oral before breakfast  polyethylene glycol 3350 17 Gram(s) Oral daily  senna Syrup 10 milliLiter(s) Oral at bedtime  sertraline 100 milliGRAM(s) Oral daily    PHYSICAL EXAM (PENDING):   Vital Signs Last 24 Hrs  T(C): 37.2 (05 Jul 2024 04:27), Max: 37.3 (04 Jul 2024 20:00)  T(F): 99 (05 Jul 2024 04:27), Max: 99.1 (04 Jul 2024 20:00)  HR: 71 (05 Jul 2024 08:00) (68 - 90)  BP: 172/91 (05 Jul 2024 08:00) (132/99 - 188/80)  BP(mean): 115 (05 Jul 2024 08:00) (90 - 881)  RR: 18 (05 Jul 2024 08:00) (16 - 18)  SpO2: 93% (05 Jul 2024 08:36) (93% - 99%)    Parameters below as of 05 Jul 2024 08:36  Patient On (Oxygen Delivery Method): room air    NEUROLOGICAL EXAM:  Mental status: Awake and alert, normal attention span. Oriented x 4 to self, place, time, situation. Speech fluent, follows commands, no neglect, normal memory   Cranial Nerves: No facial asymmetry, no nystagmus, no dysarthria,  tongue midline  Motor exam: Normal tone, no drift,  4/5 RUE, 5/5 RLE,   4/5 LUE (likely pain limited), 5/5 LLE  Impaired fine finger movements on Left.  Sensation: Intact to light touch   Coordination/ Gait: No dysmetria, gait not tested    LABS:                        12.7   11.36 )-----------( 345      ( 05 Jul 2024 05:48 )             38.4    07-05    138  |  98  |  23.6<H>  ----------------------------<  120<H>  3.8   |  27.0  |  0.69    Ca    8.7      05 Jul 2024 05:48    TPro  6.2<L>  /  Alb  3.1<L>  /  TBili  0.7  /  DBili  x   /  AST  29  /  ALT  36<H>  /  AlkPhos  72  07-05 06-29 Chol 128 LDL -- HDL 60 Trig 105    A1C:    IMAGING: Reviewed by me.   CT Head No Cont (06.30.24 @ 09:41)  IMPRESSION: No change since 6/29/2024. Right small right parietal   cortical parenchymal hemorrhage.    CT Angio Head w/ IV Cont (06.29.24 @ 13:48)  Impression: Age-appropriate involutional and ischemic gliotic changes. No   change in right parietal parenchymal hemorrhage since 7:42 AM. Normal CTA   of the head and neck.    CT Head No Cont (06.29.24 @ 07:48)  IMPRESSION:  1. 1.7 cm acute juxtacortical hematoma with surrounding vasogenic edema,   right parietal lobe. Recommend follow-up imaging, as underlying lesion   cannot be excluded.  2. Moderate-severe white matter hypodensities are nonspecific however   statistically reflects chronic microvascular ischemic change.  3. Additional findings described in detail above.    TTE W or WO Ultrasound Enhancing Agent (06.30.24 @ 08:50)  CONCLUSIONS:   1. Left ventricular cavity is normal in size. Left ventricular systolic function is normal with an ejection fraction visually estimated at 60 to 65 %.   2. There is mild (grade 1) left ventricular diastolic dysfunction.   3. Normal right ventricular cavity size and normal right ventricular systolic function.   4. The left atrium is normal in size.   5. The right atrium is normal in size.   6. Structurally normal mitral valve with normal leaflet excursion.   7. No pericardial effusion seen.   8. Mild tricuspid regurgitation.   9. Estimated pulmonary artery systolic pressure is 38 mmHg.  10. Mild left ventricular hypertrophy.  11. Technically difficult image quality.  12. Trileaflet aortic valve with normal systolic excursion. Fibrocalcific aortic valve sclerosis without stenosis.    < from: MR Head w/wo IV Cont (07.04.24 @ 12:27) >  IMPRESSION:  Small parenchymal hemorrhage peripherally within the right parietal lobe   with mild surrounding edema. No definitive enhancement, underlying   lesion, or associated abnormal flow voids. Please note the presence of   increased T1 signal precontrast somewhat limits evaluation for   enhancement.  Multifocal areas of hemosiderin compatible with prior microhemorrhages.   Multiple chronic lacunar infarcts. Moderate chronic white matter ischemic   changes. Findings likely related to long-standing hypertension.  9 mm enhancing nodule superficial lobe right parotid gland    < end of copied text >       Preliminary note, official recommendations pending attending review/signature   Seen and examined by Stroke team attending/team, assessment/ plan as discussed with stroke team attending/team as noted.     [facility] Stroke Team  Progress Note     HPI:  80 y/o Female with PMHx significant for HTN, HLD and CAD s/p stent who takes ASA presented to [facility] the morning of 6/29/24 with AMS and urinary incontinence and her last known well approximately 530am. CTH at [place] revealed R parietal lobe hematoma. Patient was transferred to [facility] for further management. Of note, patient recieved 3mg ativan, haldol and 1g keppra prior to arrival at [facility] ED for both agitation and potential seizures. Once in ED, ED providers concerned for patient's airway as she was gargling with respirations. Decision made by ED staff to intubate patient for airway protection. ROS unable to be obtained as patient was in distress altered on exam.   GCS: 12 (E3, V4, M5) prior to intubation, ICH: 1, mRS: 0    SUBJECTIVE: No events overnight. No new neurologic complaints. ROS reported negative unless otherwise noted.    acetaminophen     Tablet .. 650 milliGRAM(s) Oral every 6 hours PRN  albuterol/ipratropium for Nebulization 3 milliLiter(s) Nebulizer every 6 hours  amLODIPine   Tablet 10 milliGRAM(s) Oral daily  atorvastatin 40 milliGRAM(s) Oral at bedtime  buDESOnide    Inhalation Suspension 0.5 milliGRAM(s) Inhalation every 12 hours  dextrose 10% Bolus 125 milliLiter(s) IV Bolus once  dextrose 5%. 1000 milliLiter(s) IV Continuous <Continuous>  dextrose 5%. 1000 milliLiter(s) IV Continuous <Continuous>  dextrose 50% Injectable 25 Gram(s) IV Push once  dextrose 50% Injectable 12.5 Gram(s) IV Push once  dextrose Oral Gel 15 Gram(s) Oral once PRN  furosemide   Injectable 40 milliGRAM(s) IV Push daily  glucagon  Injectable 1 milliGRAM(s) IntraMuscular once  hydrALAZINE Injectable 10 milliGRAM(s) IV Push every 2 hours PRN  insulin lispro (ADMELOG) corrective regimen sliding scale   SubCutaneous three times a day before meals  insulin lispro (ADMELOG) corrective regimen sliding scale   SubCutaneous at bedtime  labetalol Injectable 10 milliGRAM(s) IV Push every 2 hours PRN  levalbuterol Inhalation 0.63 milliGRAM(s) Inhalation every 6 hours PRN  levETIRAcetam   Injectable 1000 milliGRAM(s) IV Push every 12 hours  lidocaine   4% Patch 1 Patch Transdermal every 24 hours  losartan 100 milliGRAM(s) Oral daily  methylPREDNISolone sodium succinate Injectable 40 milliGRAM(s) IV Push every 8 hours  metoprolol tartrate 50 milliGRAM(s) Oral two times a day  montelukast 10 milliGRAM(s) Oral daily  pantoprazole    Tablet 40 milliGRAM(s) Oral before breakfast  polyethylene glycol 3350 17 Gram(s) Oral daily  senna Syrup 10 milliLiter(s) Oral at bedtime  sertraline 100 milliGRAM(s) Oral daily    PHYSICAL EXAM (PENDING):   Vital Signs Last 24 Hrs  T(C): 37.2 (05 Jul 2024 04:27), Max: 37.3 (04 Jul 2024 20:00)  T(F): 99 (05 Jul 2024 04:27), Max: 99.1 (04 Jul 2024 20:00)  HR: 71 (05 Jul 2024 08:00) (68 - 90)  BP: 172/91 (05 Jul 2024 08:00) (132/99 - 188/80)  BP(mean): 115 (05 Jul 2024 08:00) (90 - 881)  RR: 18 (05 Jul 2024 08:00) (16 - 18)  SpO2: 93% (05 Jul 2024 08:36) (93% - 99%)    Parameters below as of 05 Jul 2024 08:36  Patient On (Oxygen Delivery Method): room air    NEUROLOGICAL EXAM:  Mental status: Awake and alert, normal attention span. Oriented x 4 to self, place, time, situation. Speech fluent, follows commands, no neglect, normal memory   Cranial Nerves: No facial asymmetry, no nystagmus, no dysarthria,  tongue midline  Motor exam: Normal tone, no drift,  LUE 4/5, strength otherwise full  Impaired fine finger movements on Left.  Sensation: Intact to light touch   Coordination/ Gait: No dysmetria, gait not tested    LABS:                        12.7   11.36 )-----------( 345      ( 05 Jul 2024 05:48 )             38.4    07-05    138  |  98  |  23.6<H>  ----------------------------<  120<H>  3.8   |  27.0  |  0.69    Ca    8.7      05 Jul 2024 05:48    TPro  6.2<L>  /  Alb  3.1<L>  /  TBili  0.7  /  DBili  x   /  AST  29  /  ALT  36<H>  /  AlkPhos  72  07-05 06-29 Chol 128 LDL -- HDL 60 Trig 105    A1C:    IMAGING: Reviewed by me.   CT Head No Cont (06.30.24 @ 09:41)  IMPRESSION: No change since 6/29/2024. Right small right parietal   cortical parenchymal hemorrhage.    CT Angio Head w/ IV Cont (06.29.24 @ 13:48)  Impression: Age-appropriate involutional and ischemic gliotic changes. No   change in right parietal parenchymal hemorrhage since 7:42 AM. Normal CTA   of the head and neck.    CT Head No Cont (06.29.24 @ 07:48)  IMPRESSION:  1. 1.7 cm acute juxtacortical hematoma with surrounding vasogenic edema,   right parietal lobe. Recommend follow-up imaging, as underlying lesion   cannot be excluded.  2. Moderate-severe white matter hypodensities are nonspecific however   statistically reflects chronic microvascular ischemic change.  3. Additional findings described in detail above.    TTE W or WO Ultrasound Enhancing Agent (06.30.24 @ 08:50)  CONCLUSIONS:   1. Left ventricular cavity is normal in size. Left ventricular systolic function is normal with an ejection fraction visually estimated at 60 to 65 %.   2. There is mild (grade 1) left ventricular diastolic dysfunction.   3. Normal right ventricular cavity size and normal right ventricular systolic function.   4. The left atrium is normal in size.   5. The right atrium is normal in size.   6. Structurally normal mitral valve with normal leaflet excursion.   7. No pericardial effusion seen.   8. Mild tricuspid regurgitation.   9. Estimated pulmonary artery systolic pressure is 38 mmHg.  10. Mild left ventricular hypertrophy.  11. Technically difficult image quality.  12. Trileaflet aortic valve with normal systolic excursion. Fibrocalcific aortic valve sclerosis without stenosis.    < from: MR Head w/wo IV Cont (07.04.24 @ 12:27) >  IMPRESSION:  Small parenchymal hemorrhage peripherally within the right parietal lobe   with mild surrounding edema. No definitive enhancement, underlying   lesion, or associated abnormal flow voids. Please note the presence of   increased T1 signal precontrast somewhat limits evaluation for   enhancement.  Multifocal areas of hemosiderin compatible with prior microhemorrhages.   Multiple chronic lacunar infarcts. Moderate chronic white matter ischemic   changes. Findings likely related to long-standing hypertension.  9 mm enhancing nodule superficial lobe right parotid gland    < end of copied text >         [facility] Stroke Team  Progress Note     HPI:  78 y/o Female with PMHx significant for HTN, HLD and CAD s/p stent who takes ASA presented to [facility] the morning of 6/29/24 with AMS and urinary incontinence and her last known well approximately 530am. CTH at [place] revealed R parietal lobe hematoma. Patient was transferred to [facility] for further management. Of note, patient recieved 3mg ativan, haldol and 1g keppra prior to arrival at [facility] ED for both agitation and potential seizures. Once in ED, ED providers concerned for patient's airway as she was gargling with respirations. Decision made by ED staff to intubate patient for airway protection. ROS unable to be obtained as patient was in distress altered on exam.   GCS: 12 (E3, V4, M5) prior to intubation, ICH: 1, mRS: 0    SUBJECTIVE: No events overnight. No new neurologic complaints. ROS reported negative unless otherwise noted.    acetaminophen     Tablet .. 650 milliGRAM(s) Oral every 6 hours PRN  albuterol/ipratropium for Nebulization 3 milliLiter(s) Nebulizer every 6 hours  amLODIPine   Tablet 10 milliGRAM(s) Oral daily  atorvastatin 40 milliGRAM(s) Oral at bedtime  buDESOnide    Inhalation Suspension 0.5 milliGRAM(s) Inhalation every 12 hours  dextrose 10% Bolus 125 milliLiter(s) IV Bolus once  dextrose 5%. 1000 milliLiter(s) IV Continuous <Continuous>  dextrose 5%. 1000 milliLiter(s) IV Continuous <Continuous>  dextrose 50% Injectable 25 Gram(s) IV Push once  dextrose 50% Injectable 12.5 Gram(s) IV Push once  dextrose Oral Gel 15 Gram(s) Oral once PRN  furosemide   Injectable 40 milliGRAM(s) IV Push daily  glucagon  Injectable 1 milliGRAM(s) IntraMuscular once  hydrALAZINE Injectable 10 milliGRAM(s) IV Push every 2 hours PRN  insulin lispro (ADMELOG) corrective regimen sliding scale   SubCutaneous three times a day before meals  insulin lispro (ADMELOG) corrective regimen sliding scale   SubCutaneous at bedtime  labetalol Injectable 10 milliGRAM(s) IV Push every 2 hours PRN  levalbuterol Inhalation 0.63 milliGRAM(s) Inhalation every 6 hours PRN  levETIRAcetam   Injectable 1000 milliGRAM(s) IV Push every 12 hours  lidocaine   4% Patch 1 Patch Transdermal every 24 hours  losartan 100 milliGRAM(s) Oral daily  methylPREDNISolone sodium succinate Injectable 40 milliGRAM(s) IV Push every 8 hours  metoprolol tartrate 50 milliGRAM(s) Oral two times a day  montelukast 10 milliGRAM(s) Oral daily  pantoprazole    Tablet 40 milliGRAM(s) Oral before breakfast  polyethylene glycol 3350 17 Gram(s) Oral daily  senna Syrup 10 milliLiter(s) Oral at bedtime  sertraline 100 milliGRAM(s) Oral daily    PHYSICAL EXAM (PENDING):   Vital Signs Last 24 Hrs  T(C): 37.2 (05 Jul 2024 04:27), Max: 37.3 (04 Jul 2024 20:00)  T(F): 99 (05 Jul 2024 04:27), Max: 99.1 (04 Jul 2024 20:00)  HR: 71 (05 Jul 2024 08:00) (68 - 90)  BP: 172/91 (05 Jul 2024 08:00) (132/99 - 188/80)  BP(mean): 115 (05 Jul 2024 08:00) (90 - 881)  RR: 18 (05 Jul 2024 08:00) (16 - 18)  SpO2: 93% (05 Jul 2024 08:36) (93% - 99%)    Parameters below as of 05 Jul 2024 08:36  Patient On (Oxygen Delivery Method): room air    NEUROLOGICAL EXAM:  Mental status: Awake and alert, normal attention span. Oriented x 4 to self, place, time, situation. Speech fluent, follows commands, no neglect, normal memory   Cranial Nerves: No facial asymmetry, no nystagmus, no dysarthria,  tongue midline  Motor exam: Normal tone, no drift,  LUE 4/5, strength otherwise full  Impaired fine finger movements on Left.  Sensation: Intact to light touch   Coordination/ Gait: No dysmetria, gait not tested    LABS:                        12.7   11.36 )-----------( 345      ( 05 Jul 2024 05:48 )             38.4    07-05    138  |  98  |  23.6<H>  ----------------------------<  120<H>  3.8   |  27.0  |  0.69    Ca    8.7      05 Jul 2024 05:48    TPro  6.2<L>  /  Alb  3.1<L>  /  TBili  0.7  /  DBili  x   /  AST  29  /  ALT  36<H>  /  AlkPhos  72  07-05 06-29 Chol 128 LDL -- HDL 60 Trig 105    A1C:    IMAGING: Reviewed by me.   CT Head No Cont (06.30.24 @ 09:41)  IMPRESSION: No change since 6/29/2024. Right small right parietal   cortical parenchymal hemorrhage.    CT Angio Head w/ IV Cont (06.29.24 @ 13:48)  Impression: Age-appropriate involutional and ischemic gliotic changes. No   change in right parietal parenchymal hemorrhage since 7:42 AM. Normal CTA   of the head and neck.    CT Head No Cont (06.29.24 @ 07:48)  IMPRESSION:  1. 1.7 cm acute juxtacortical hematoma with surrounding vasogenic edema,   right parietal lobe. Recommend follow-up imaging, as underlying lesion   cannot be excluded.  2. Moderate-severe white matter hypodensities are nonspecific however   statistically reflects chronic microvascular ischemic change.  3. Additional findings described in detail above.    TTE W or WO Ultrasound Enhancing Agent (06.30.24 @ 08:50)  CONCLUSIONS:   1. Left ventricular cavity is normal in size. Left ventricular systolic function is normal with an ejection fraction visually estimated at 60 to 65 %.   2. There is mild (grade 1) left ventricular diastolic dysfunction.   3. Normal right ventricular cavity size and normal right ventricular systolic function.   4. The left atrium is normal in size.   5. The right atrium is normal in size.   6. Structurally normal mitral valve with normal leaflet excursion.   7. No pericardial effusion seen.   8. Mild tricuspid regurgitation.   9. Estimated pulmonary artery systolic pressure is 38 mmHg.  10. Mild left ventricular hypertrophy.  11. Technically difficult image quality.  12. Trileaflet aortic valve with normal systolic excursion. Fibrocalcific aortic valve sclerosis without stenosis.    < from: MR Head w/wo IV Cont (07.04.24 @ 12:27) >  IMPRESSION:  Small parenchymal hemorrhage peripherally within the right parietal lobe   with mild surrounding edema. No definitive enhancement, underlying   lesion, or associated abnormal flow voids. Please note the presence of   increased T1 signal precontrast somewhat limits evaluation for   enhancement.  Multifocal areas of hemosiderin compatible with prior microhemorrhages.   Multiple chronic lacunar infarcts. Moderate chronic white matter ischemic   changes. Findings likely related to long-standing hypertension.  9 mm enhancing nodule superficial lobe right parotid gland    < end of copied text >

## 2024-07-05 NOTE — PHYSICAL THERAPY INITIAL EVALUATION ADULT - PRECAUTIONS/LIMITATIONS, REHAB EVAL
keep head above 30-45degrees/cardiac precautions/seizure precautions
aspiration precautions/fall precautions/seizure precautions

## 2024-07-06 LAB
ALBUMIN SERPL ELPH-MCNC: 3.2 G/DL — LOW (ref 3.3–5.2)
ALP SERPL-CCNC: 70 U/L — SIGNIFICANT CHANGE UP (ref 40–120)
ALT FLD-CCNC: 32 U/L — SIGNIFICANT CHANGE UP
ANION GAP SERPL CALC-SCNC: 14 MMOL/L — SIGNIFICANT CHANGE UP (ref 5–17)
AST SERPL-CCNC: 25 U/L — SIGNIFICANT CHANGE UP
BILIRUB SERPL-MCNC: 0.7 MG/DL — SIGNIFICANT CHANGE UP (ref 0.4–2)
BUN SERPL-MCNC: 26.5 MG/DL — HIGH (ref 8–20)
CALCIUM SERPL-MCNC: 8.7 MG/DL — SIGNIFICANT CHANGE UP (ref 8.4–10.5)
CHLORIDE SERPL-SCNC: 95 MMOL/L — LOW (ref 96–108)
CO2 SERPL-SCNC: 28 MMOL/L — SIGNIFICANT CHANGE UP (ref 22–29)
CREAT SERPL-MCNC: 0.8 MG/DL — SIGNIFICANT CHANGE UP (ref 0.5–1.3)
EGFR: 75 ML/MIN/1.73M2 — SIGNIFICANT CHANGE UP
GLUCOSE BLDC GLUCOMTR-MCNC: 104 MG/DL — HIGH (ref 70–99)
GLUCOSE BLDC GLUCOMTR-MCNC: 118 MG/DL — HIGH (ref 70–99)
GLUCOSE BLDC GLUCOMTR-MCNC: 119 MG/DL — HIGH (ref 70–99)
GLUCOSE BLDC GLUCOMTR-MCNC: 209 MG/DL — HIGH (ref 70–99)
GLUCOSE SERPL-MCNC: 89 MG/DL — SIGNIFICANT CHANGE UP (ref 70–99)
HCT VFR BLD CALC: 40.5 % — SIGNIFICANT CHANGE UP (ref 34.5–45)
HGB BLD-MCNC: 13.6 G/DL — SIGNIFICANT CHANGE UP (ref 11.5–15.5)
MCHC RBC-ENTMCNC: 29.6 PG — SIGNIFICANT CHANGE UP (ref 27–34)
MCHC RBC-ENTMCNC: 33.6 GM/DL — SIGNIFICANT CHANGE UP (ref 32–36)
MCV RBC AUTO: 88 FL — SIGNIFICANT CHANGE UP (ref 80–100)
PLATELET # BLD AUTO: 334 K/UL — SIGNIFICANT CHANGE UP (ref 150–400)
POTASSIUM SERPL-MCNC: 3.2 MMOL/L — LOW (ref 3.5–5.3)
POTASSIUM SERPL-SCNC: 3.2 MMOL/L — LOW (ref 3.5–5.3)
PROT SERPL-MCNC: 6.1 G/DL — LOW (ref 6.6–8.7)
RBC # BLD: 4.6 M/UL — SIGNIFICANT CHANGE UP (ref 3.8–5.2)
RBC # FLD: 14.2 % — SIGNIFICANT CHANGE UP (ref 10.3–14.5)
SODIUM SERPL-SCNC: 137 MMOL/L — SIGNIFICANT CHANGE UP (ref 135–145)
WBC # BLD: 11.83 K/UL — HIGH (ref 3.8–10.5)
WBC # FLD AUTO: 11.83 K/UL — HIGH (ref 3.8–10.5)

## 2024-07-06 PROCEDURE — 99232 SBSQ HOSP IP/OBS MODERATE 35: CPT

## 2024-07-06 RX ORDER — POTASSIUM CHLORIDE 600 MG/1
40 TABLET, FILM COATED, EXTENDED RELEASE ORAL ONCE
Refills: 0 | Status: COMPLETED | OUTPATIENT
Start: 2024-07-06 | End: 2024-07-06

## 2024-07-06 RX ORDER — SPIRONOLACTONE 25 MG/1
25 TABLET ORAL DAILY
Refills: 0 | Status: DISCONTINUED | OUTPATIENT
Start: 2024-07-06 | End: 2024-07-11

## 2024-07-06 RX ORDER — ASPIRIN 325 MG/1
81 TABLET, FILM COATED ORAL DAILY
Refills: 0 | Status: DISCONTINUED | OUTPATIENT
Start: 2024-07-06 | End: 2024-07-11

## 2024-07-06 RX ORDER — SACUBITRIL AND VALSARTAN 97; 103 MG/1; MG/1
1 TABLET, FILM COATED ORAL
Refills: 0 | Status: DISCONTINUED | OUTPATIENT
Start: 2024-07-06 | End: 2024-07-07

## 2024-07-06 RX ADMIN — Medication 50 MILLIGRAM(S): at 17:08

## 2024-07-06 RX ADMIN — BUDESONIDE 0.5 MILLIGRAM(S): 0.25 INHALANT ORAL at 08:34

## 2024-07-06 RX ADMIN — LEVETIRACETAM 1000 MILLIGRAM(S): 100 INJECTION INTRAVENOUS at 17:08

## 2024-07-06 RX ADMIN — LIDOCAINE HCL 1 PATCH: 28 GEL TOPICAL at 08:54

## 2024-07-06 RX ADMIN — LEVETIRACETAM 1000 MILLIGRAM(S): 100 INJECTION INTRAVENOUS at 05:22

## 2024-07-06 RX ADMIN — INSULIN LISPRO 2: 100 INJECTION, SOLUTION SUBCUTANEOUS at 11:14

## 2024-07-06 RX ADMIN — ENOXAPARIN SODIUM 40 MILLIGRAM(S): 100 INJECTION SUBCUTANEOUS at 17:08

## 2024-07-06 RX ADMIN — IPRATROPIUM BROMIDE AND ALBUTEROL SULFATE 3 MILLILITER(S): .5; 3 SOLUTION RESPIRATORY (INHALATION) at 21:09

## 2024-07-06 RX ADMIN — ASPIRIN 81 MILLIGRAM(S): 325 TABLET, FILM COATED ORAL at 11:14

## 2024-07-06 RX ADMIN — LIDOCAINE HCL 1 PATCH: 28 GEL TOPICAL at 21:45

## 2024-07-06 RX ADMIN — SACUBITRIL AND VALSARTAN 1 TABLET(S): 97; 103 TABLET, FILM COATED ORAL at 17:08

## 2024-07-06 RX ADMIN — ATORVASTATIN CALCIUM 40 MILLIGRAM(S): 20 TABLET, FILM COATED ORAL at 21:45

## 2024-07-06 RX ADMIN — POTASSIUM CHLORIDE 40 MILLIEQUIVALENT(S): 600 TABLET, FILM COATED, EXTENDED RELEASE ORAL at 09:04

## 2024-07-06 RX ADMIN — AMLODIPINE BESYLATE 10 MILLIGRAM(S): 2.5 TABLET ORAL at 05:22

## 2024-07-06 RX ADMIN — MONTELUKAST SODIUM 10 MILLIGRAM(S): 10 TABLET, FILM COATED ORAL at 11:14

## 2024-07-06 RX ADMIN — IPRATROPIUM BROMIDE AND ALBUTEROL SULFATE 3 MILLILITER(S): .5; 3 SOLUTION RESPIRATORY (INHALATION) at 08:34

## 2024-07-06 RX ADMIN — SPIRONOLACTONE 50 MILLIGRAM(S): 25 TABLET ORAL at 05:24

## 2024-07-06 RX ADMIN — Medication 10 MILLILITER(S): at 21:45

## 2024-07-06 RX ADMIN — FUROSEMIDE 40 MILLIGRAM(S): 10 INJECTION, SOLUTION INTRAMUSCULAR; INTRAVENOUS at 05:24

## 2024-07-06 RX ADMIN — Medication 50 MILLIGRAM(S): at 05:23

## 2024-07-06 RX ADMIN — SERTRALINE HYDROCHLORIDE 100 MILLIGRAM(S): 100 TABLET, FILM COATED ORAL at 11:14

## 2024-07-06 RX ADMIN — PANTOPRAZOLE SODIUM 40 MILLIGRAM(S): 40 INJECTION, POWDER, FOR SOLUTION INTRAVENOUS at 05:24

## 2024-07-06 RX ADMIN — IPRATROPIUM BROMIDE AND ALBUTEROL SULFATE 3 MILLILITER(S): .5; 3 SOLUTION RESPIRATORY (INHALATION) at 14:18

## 2024-07-06 RX ADMIN — PREDNISONE 40 MILLIGRAM(S): 10 TABLET ORAL at 05:23

## 2024-07-06 RX ADMIN — LOSARTAN POTASSIUM 100 MILLIGRAM(S): 100 TABLET, FILM COATED ORAL at 05:24

## 2024-07-06 RX ADMIN — BUDESONIDE 0.5 MILLIGRAM(S): 0.25 INHALANT ORAL at 21:09

## 2024-07-06 RX ADMIN — HYDRALAZINE HYDROCHLORIDE 10 MILLIGRAM(S): 50 TABLET ORAL at 18:26

## 2024-07-06 NOTE — PROGRESS NOTE ADULT - PROBLEM SELECTOR PROBLEM 1
Acute heart failure with preserved ejection fraction (HFpEF)
Stridor
Elevated troponin
Acute heart failure with preserved ejection fraction (HFpEF)

## 2024-07-06 NOTE — PROGRESS NOTE ADULT - SUBJECTIVE AND OBJECTIVE BOX
[facility]                                                         CARDIOLOGY AT [address], [place] [address]                                                         Telephone: [phone]. Fax:[phone]                                                                             PROGRESS NOTE    Reason for follow up: discharge planning     Review of symptoms:   Cardiac:  No chest pain. No dyspnea. No palpitations.  Respiratory: no cough. No dyspnea  Gastrointestinal: No diarrhea. No abdominal pain. No bleeding.   Neuro: No focal neuro complaints.    Vitals:  T(C): 36.3 (07-06-24 @ 08:59), Max: 36.6 (07-05-24 @ 15:11)  HR: 74 (07-06-24 @ 12:00) (67 - 97)  BP: 151/88 (07-06-24 @ 12:00) (121/84 - 172/109)  RR: 18 (07-06-24 @ 12:00) (15 - 18)  SpO2: 95% (07-06-24 @ 12:00) (94% - 100%)  Wt(kg): --  I&O's Summary    05 Jul 2024 07:01  -  06 Jul 2024 07:00  --------------------------------------------------------  IN: 660 mL / OUT: 1100 mL / NET: -440 mL    06 Jul 2024 07:01  -  06 Jul 2024 13:30  --------------------------------------------------------  IN: 0 mL / OUT: 900 mL / NET: -900 mL          PHYSICAL EXAM:  Appearance: Comfortable. No acute distress  HEENT:  Atraumatic. Normocephalic.  Normal oral mucosa  Neurologic: A & O x 3, no gross focal deficits.  Cardiovascular: RRR S1 S2, No murmur, no rubs/gallops. No JVD  Respiratory: Lungs clear to auscultation, unlabored   Gastrointestinal:  Soft, Non-tender, + BS  Lower Extremities: 2+ Peripheral Pulses, No clubbing, cyanosis, or edema  Psychiatry: Patient is calm. No agitation.   Skin: warm and dry.    CURRENT CARDIAC MEDICATIONS:  amLODIPine   Tablet 10 milliGRAM(s) Oral daily  furosemide    Tablet 40 milliGRAM(s) Oral daily  hydrALAZINE Injectable 10 milliGRAM(s) IV Push every 2 hours PRN  labetalol Injectable 10 milliGRAM(s) IV Push every 2 hours PRN  metoprolol tartrate 50 milliGRAM(s) Oral two times a day  sacubitril 49 mG/valsartan 51 mG 1 Tablet(s) Oral two times a day  spironolactone 25 milliGRAM(s) Oral daily      CURRENT OTHER MEDICATIONS:  albuterol/ipratropium for Nebulization 3 milliLiter(s) Nebulizer every 6 hours  buDESOnide    Inhalation Suspension 0.5 milliGRAM(s) Inhalation every 12 hours  levalbuterol Inhalation 0.63 milliGRAM(s) Inhalation every 6 hours PRN SOB/wheezing  montelukast 10 milliGRAM(s) Oral daily  acetaminophen     Tablet .. 650 milliGRAM(s) Oral every 6 hours PRN Temp greater or equal to 38C (100.4F), Mild Pain (1 - 3)  levETIRAcetam   Injectable 1000 milliGRAM(s) IV Push every 12 hours  sertraline 100 milliGRAM(s) Oral daily  polyethylene glycol 3350 17 Gram(s) Oral daily  senna Syrup 10 milliLiter(s) Oral at bedtime  atorvastatin 40 milliGRAM(s) Oral at bedtime  dextrose 50% Injectable 12.5 Gram(s) IV Push once, Stop order after: 1 Doses  dextrose 50% Injectable 25 Gram(s) IV Push once, Stop order after: 1 Doses  dextrose Oral Gel 15 Gram(s) Oral once, Stop order after: 1 Doses PRN Blood Glucose LESS THAN 70 milliGRAM(s)/deciliter  glucagon  Injectable 1 milliGRAM(s) IntraMuscular once, Stop order after: 1 Doses  insulin lispro (ADMELOG) corrective regimen sliding scale   SubCutaneous at bedtime  insulin lispro (ADMELOG) corrective regimen sliding scale   SubCutaneous three times a day before meals  predniSONE   Tablet 40 milliGRAM(s) Oral daily, Stop order after: 5 Days  aspirin  chewable 81 milliGRAM(s) Oral daily  benzocaine/menthol Lozenge 1 Lozenge Oral four times a day, Stop order after: 24 Hours PRN Sore Throat  dextrose 10% Bolus 125 milliLiter(s) IV Bolus once, Stop order after: 1 Doses  dextrose 5%. 1000 milliLiter(s) (50 mL/Hr) IV Continuous <Continuous>  dextrose 5%. 1000 milliLiter(s) (100 mL/Hr) IV Continuous <Continuous>  enoxaparin Injectable 40 milliGRAM(s) SubCutaneous every 24 hours  lidocaine   4% Patch 1 Patch Transdermal every 24 hours      LABS:	 	  ( 02 Jul 2024 22:13 )  Troponin T  X    ,  CPK  614<H>, CKMB  X    , BNP X        , ( 29 Jun 2024 07:15 )  Troponin T  X    ,  CPK  104  , CKMB  X    , BNP X                                  13.6   11.83 )-----------( 334      ( 06 Jul 2024 07:37 )             40.5     07-06    137  |  95<L>  |  26.5<H>  ----------------------------<  89  3.2<L>   |  28.0  |  0.80    Ca    8.7      06 Jul 2024 07:37    TPro  6.1<L>  /  Alb  3.2<L>  /  TBili  0.7  /  DBili  x   /  AST  25  /  ALT  32  /  AlkPhos  70  07-06    PT/INR/PTT ( 29 Jun 2024 12:07 )                       :                       :      11.8         :       32.9                  .        .                   .              .           .       1.07        .                                       Lipid Profile: Date: 06-29 @ 14:50  Total cholesterol 128; Direct LDL: --; HDL: 60; Triglycerides:105    HgA1c:   TSH:

## 2024-07-06 NOTE — PROGRESS NOTE ADULT - ASSESSMENT
79F with MHx significant for HTN, HLD and CAD s/p stent who takes ASA presented to [facility] this morning with AMS and urinary incontinence and her last known well approximately 530am. CTH at [place] revealed R parietal lobe hematoma    #R Parietal lobe hematoma surrounding vasogenic edema  #New onset seizures  Transferred from [facility]  MRI Head w/wo reviewed  ASA resumed 7/6  Atorvastatin when able to tolerate PO  KEPPRA 1000mg BID - no driving until outpatient neurology followup   Seizure precautions  Dysphagia  CT neck w/o significant stenosis    #Stridor - resolved   S/p intubation for 22hrs, now on RA  S/p laryngoscopy w concern for subglottic stenosis  Heliox therapy, racemic epi  Steroids- solumedrol 40mg IV q6, change to PO Prednisone x 5 days  ENT eval noted, outpt f/u  CT chest noted with mass effect -- d/w radiation, that is a anatomic variant, no clinical significance     #HTN, HLD and CAD s/p stent  #HFpEF  Entresto, aldactone, lopressor, Amlodipine   Statin  Appreciate cards recs - will c/w PO lasix QD  ASA    #Troponemia  Likely demand from above, trended down and remained flat  No chest pain or ischemic ekg changes    #Psych  Zoloft    #GERD  Pronotix    DVT ppx: Lovenox    S/S eval- Regular diet  PT- Acute rehab pending   Updated patient's family on 7/6

## 2024-07-06 NOTE — PROGRESS NOTE ADULT - PROBLEM SELECTOR PROBLEM 3
CAD (coronary artery disease)
HTN (hypertension)
CAD (coronary artery disease)
CAD (coronary artery disease)

## 2024-07-06 NOTE — PROGRESS NOTE ADULT - TIME BILLING
greater than 50% of time spent reviewing labs, notes, orders and radiographs, coordinating care  discussed with pt, family, RT at bedside
reviewing labs, notes, orders, radiographic studies, as well as counseling and coordinating care with the relevant multidisciplinary team, including with the primary and consulting providers.
Acute HFpEF, HTN, CAD
Acute HFpEF, HTN, PVCs/PATs
Acute HFpEF, Resistant HTN, CAD
Acute HFpEF, Resistant HTN, ICH
Acute HFpEF, HTN, Cerebral hemorrhage

## 2024-07-06 NOTE — PROGRESS NOTE ADULT - SUBJECTIVE AND OBJECTIVE BOX
April Petersen M.D.    Patient is a 79y old  Female who presents with a chief complaint of IPH ?seizure (05 Jul 2024 19:06)      SUBJECTIVE / OVERNIGHT EVENTS: no event overnight.     Patient denies chest pain, SOB, abd pain, N/V, fever, chills, dysuria or any other complaints. All remainder ROS negative.     MEDICATIONS  (STANDING):  albuterol/ipratropium for Nebulization 3 milliLiter(s) Nebulizer every 6 hours  amLODIPine   Tablet 10 milliGRAM(s) Oral daily  aspirin  chewable 81 milliGRAM(s) Oral daily  atorvastatin 40 milliGRAM(s) Oral at bedtime  buDESOnide    Inhalation Suspension 0.5 milliGRAM(s) Inhalation every 12 hours  dextrose 10% Bolus 125 milliLiter(s) IV Bolus once  dextrose 5%. 1000 milliLiter(s) (50 mL/Hr) IV Continuous <Continuous>  dextrose 5%. 1000 milliLiter(s) (100 mL/Hr) IV Continuous <Continuous>  dextrose 50% Injectable 12.5 Gram(s) IV Push once  dextrose 50% Injectable 25 Gram(s) IV Push once  enoxaparin Injectable 40 milliGRAM(s) SubCutaneous every 24 hours  furosemide    Tablet 40 milliGRAM(s) Oral daily  glucagon  Injectable 1 milliGRAM(s) IntraMuscular once  insulin lispro (ADMELOG) corrective regimen sliding scale   SubCutaneous at bedtime  insulin lispro (ADMELOG) corrective regimen sliding scale   SubCutaneous three times a day before meals  levETIRAcetam   Injectable 1000 milliGRAM(s) IV Push every 12 hours  lidocaine   4% Patch 1 Patch Transdermal every 24 hours  metoprolol tartrate 50 milliGRAM(s) Oral two times a day  montelukast 10 milliGRAM(s) Oral daily  polyethylene glycol 3350 17 Gram(s) Oral daily  predniSONE   Tablet 40 milliGRAM(s) Oral daily  sacubitril 49 mG/valsartan 51 mG 1 Tablet(s) Oral two times a day  senna Syrup 10 milliLiter(s) Oral at bedtime  sertraline 100 milliGRAM(s) Oral daily  spironolactone 25 milliGRAM(s) Oral daily    MEDICATIONS  (PRN):  acetaminophen     Tablet .. 650 milliGRAM(s) Oral every 6 hours PRN Temp greater or equal to 38C (100.4F), Mild Pain (1 - 3)  benzocaine/menthol Lozenge 1 Lozenge Oral four times a day PRN Sore Throat  dextrose Oral Gel 15 Gram(s) Oral once PRN Blood Glucose LESS THAN 70 milliGRAM(s)/deciliter  hydrALAZINE Injectable 10 milliGRAM(s) IV Push every 2 hours PRN Sbp >160  labetalol Injectable 10 milliGRAM(s) IV Push every 2 hours PRN SBP>160  levalbuterol Inhalation 0.63 milliGRAM(s) Inhalation every 6 hours PRN SOB/wheezing      I&O's Summary    05 Jul 2024 07:01  -  06 Jul 2024 07:00  --------------------------------------------------------  IN: 660 mL / OUT: 1100 mL / NET: -440 mL    06 Jul 2024 07:01  -  06 Jul 2024 12:53  --------------------------------------------------------  IN: 0 mL / OUT: 900 mL / NET: -900 mL        PHYSICAL EXAM:  Vital Signs Last 24 Hrs  T(C): 36.3 (06 Jul 2024 08:59), Max: 36.6 (05 Jul 2024 15:11)  T(F): 97.4 (06 Jul 2024 08:59), Max: 97.8 (05 Jul 2024 15:11)  HR: 81 (06 Jul 2024 10:00) (67 - 97)  BP: 136/98 (06 Jul 2024 10:00) (121/84 - 172/109)  BP(mean): 111 (06 Jul 2024 10:00) (94 - 123)  RR: 18 (06 Jul 2024 10:00) (15 - 18)  SpO2: 94% (06 Jul 2024 10:00) (94% - 100%)    Parameters below as of 06 Jul 2024 10:00  Patient On (Oxygen Delivery Method): room air      CONSTITUTIONAL: NAD, well-groomed  RESPIRATORY: Normal respiratory effort; lungs are clear to auscultation bilaterally  CARDIOVASCULAR: Regular rate and rhythm, no LE edema  ABDOMEN: Nontender to palpation, normoactive bowel sounds  PSYCH: A+O x3; affect appropriate  NEUROLOGY: CN 2-12 are intact and symmetric; no gross sensory deficits;     LABS:                        13.6   11.83 )-----------( 334      ( 06 Jul 2024 07:37 )             40.5     07-06    137  |  95<L>  |  26.5<H>  ----------------------------<  89  3.2<L>   |  28.0  |  0.80    Ca    8.7      06 Jul 2024 07:37    TPro  6.1<L>  /  Alb  3.2<L>  /  TBili  0.7  /  DBili  x   /  AST  25  /  ALT  32  /  AlkPhos  70  07-06          Urinalysis Basic - ( 06 Jul 2024 07:37 )    Color: x / Appearance: x / SG: x / pH: x  Gluc: 89 mg/dL / Ketone: x  / Bili: x / Urobili: x   Blood: x / Protein: x / Nitrite: x   Leuk Esterase: x / RBC: x / WBC x   Sq Epi: x / Non Sq Epi: x / Bacteria: x        CAPILLARY BLOOD GLUCOSE      POCT Blood Glucose.: 209 mg/dL (06 Jul 2024 11:13)  POCT Blood Glucose.: 104 mg/dL (06 Jul 2024 07:50)  POCT Blood Glucose.: 115 mg/dL (05 Jul 2024 21:19)  POCT Blood Glucose.: 182 mg/dL (05 Jul 2024 17:55)  POCT Blood Glucose.: 124 mg/dL (05 Jul 2024 13:15)      RADIOLOGY & ADDITIONAL TESTS:  Results Reviewed:   Imaging Personally Reviewed:  Electrocardiogram Personally Reviewed:

## 2024-07-06 NOTE — PROGRESS NOTE ADULT - PROBLEM SELECTOR PLAN 3
- Elevated troponins, downtrending.   - Echo with normal EF no RWMA.   - No ischemic evaluation can be undertaken at this time given ICH.   - Restart PTA cardiac medication when able.
- SBP goal <160 per neurosurgery  -C/w IV hydral PRN
- Elevated troponins, downtrending.   - cannot exclude type 1 NSTEMI, likely type 2   - Echo with normal EF no RWMA.   - No ischemic evaluation can be undertaken at this time given ICH.  - MRI complete, as per neuro ASA is on hold for ICH   - for now medical management  - start ASA when deemed appropriate by neuro
- Elevated troponins, downtrending.   - Echo with normal EF no RWMA.   - No ischemic evaluation can be undertaken at this time given ICH.   - Restart PTA cardiac medication when able.
- Elevated troponins, downtrending.   - cannot exclude type 1 NSTEMI, likely type 2   - Echo with normal EF no RWMA.   - No ischemic evaluation can be undertaken at this time given ICH. MRI pending. would need neuro clearance before consideration of ischemic eval   - for now medical management  - start ASA when deemed appropriate by neuro   - Restart PTA cardiac medication when able.
- Elevated troponins, downtrending.   - cannot exclude type 1 NSTEMI, likely type 2   - Echo with normal EF no RWMA.   - No ischemic evaluation can be undertaken at this time given ICH.  - MRI complete, as per neuro ASA is on hold for ICH   - for now medical management  - start ASA when deemed appropriate by neuro

## 2024-07-06 NOTE — PROGRESS NOTE ADULT - ASSESSMENT
This is a 80 y/o with hx of HTN, HLD, CAD s/p stent in 2000 (family unsure as to what type of stent it is), takes ASA and Crestor presented to [facility] with AMS and urinary incontinence where LKW was 5:30 AM prior to arrival. CTH in [place] revealed R parietal lobe hematoma. Pt transferred to [facility] for further management. Once arrival in [facility] ED, patient was gargling with respirations, so pt was intubated for airway protection. Cardiology consulted for elevated troponin.

## 2024-07-06 NOTE — PROGRESS NOTE ADULT - PROBLEM SELECTOR PLAN 2
- resolved, last   - meds as above
- Poorly controlled.  - meds as above
- Uncontrolled.   - Losartan increased to 100mg PO qday.  - Add norvasc if further BP control is needed.   - Continue metoprolol 25mg PO BID.
- Poorly controlled.  - Continue losartan 100mg PO qday.   - Increase metoprolol to 50mg PO BID, and start norvasc 5mg PO qday.
- hx of CAD s/p stent in 2000 (unsure of what type of stent)   - Cholesterol, triglycerides, and HDL within normal range   - on ASA at home, resume ASA once cleared by neurosurgery   - resume statin when able.
- resolved, last   - meds as above

## 2024-07-06 NOTE — PROGRESS NOTE ADULT - PROBLEM SELECTOR PLAN 1
- Echocardiogram 06/2024 with EF 60-65%, grade I DD, and mild TR.   - Patient hypertensive this admission with worsening shortness of breath, wheezing, and stridor.   - Patient pBNP up to 7K from 1K.   - change lasix IV to PO   - Strict BP control, decompensation likely due to uncontrolled HTN.   - Continue, metoprolol, amlodipine, spironolactone,   - cut spironolactone to 25mg daily and then change losartan to entresto   - Pulm and ENT following as well, dyspnea is multifactorial.   - Monitor on telemetry.   - Strict i/o and daily weights.    - Keep K > 4, Mg > 2.   - otherwise no further inpatient cardiology workup or recommendations, will sign off
- Echocardiogram 06/2024 with EF 60-65%, grade I DD, and mild TR.   - Patient hypertensive this admission with worsening shortness of breath, wheezing, and stridor.   - Patient pBNP up to 7K from 1K.   - Continue lasix 40mg IV daily   - Strict BP control, decompensation likely due to uncontrolled HTN.   - Continue losartan 100mg PO qday.   - Increase metoprolol to 50mg PO BID, and start norvasc 5mg PO qday.  - Pulm and ENT following as well, dyspnea is multifactorial.   - Monitor on telemetry.   - Strict i/o and daily weights.    - Keep K > 4, Mg > 2.   - Monitor renal function with ongoing diuresis.
-Cont w breahting treatments and steroids per pulm  -Pending CT neck without contrast for workup of possible subglottic stenosis
- Echocardiogram 06/2024 with EF 60-65%, grade I DD, and mild TR.   - Patient hypertensive overnight and then with worsening shortness of breath, wheezing, and stridor.   - Patient pBNP up to 7K from 1K.   - Continue lasix 40mg IV daily today and tomorrow, then transition to PO.  - Strict BP control, decompensation likely due to uncontrolled HTN.   - Continue losartan 100mg PO qday.   - Increase metoprolol to 50mg PO BID, and start norvasc 5mg PO qday.  - Pulm and ENT following as well, dyspnea is multifactorial.   - Monitor on telemetry.   - Strict i/o and daily weights.    - Keep K > 4, Mg > 2.   - Monitor renal function with ongoing diuresis.
-113 -> 119  -TTE pend  - EKG NSR with no ischemia   - not a candidate for invasive cardiac therapy given use of heparin products and ICH.   -ASA per neuro sx
- Echocardiogram 06/2024 with EF 60-65%, grade I DD, and mild TR.   - Patient hypertensive overnight and then with worsening shortness of breath, wheezing, and stridor.   - Patient pBNP up to 7K from 1K.   - Start lasix 40mg IV BID.   - Strict BP control, decompensation likely due to uncontrolled HTN.   - Continue losartan 100mg PO qday and metoprolol 25mg PO BID.   - Add norvasc if further BP control is needed.   - Pulm and ENT following as well, dyspnea is multifactorial.   - Monitor on telemetry.   - Strict i/o and daily weights.    - Keep K > 4, Mg > 2.   - Monitor renal function with ongoing diuresis.
- Echocardiogram 06/2024 with EF 60-65%, grade I DD, and mild TR.   - Patient hypertensive this admission with worsening shortness of breath, wheezing, and stridor.   - Patient pBNP up to 7K from 1K.   - change lasix IV to PO   - Strict BP control, decompensation likely due to uncontrolled HTN.   - Continue losartan, metoprolol, amlodipine, spironolactone,   - Pulm and ENT following as well, dyspnea is multifactorial.   - Monitor on telemetry.   - Strict i/o and daily weights.    - Keep K > 4, Mg > 2.   - Monitor renal function with ongoing diuresis.

## 2024-07-06 NOTE — PROGRESS NOTE ADULT - NS ATTEND OPT1 GEN_ALL_CORE
Stable  ·  follows with pain management  ·  at the level C5-C6  ·  no treatment required at this time 
I attest my time as attending is greater than 50% of the total combined time spent on qualifying patient care activities by the PA/NP and attending.
I independently performed the documented:

## 2024-07-07 LAB
ANION GAP SERPL CALC-SCNC: 11 MMOL/L — SIGNIFICANT CHANGE UP (ref 5–17)
BUN SERPL-MCNC: 19.9 MG/DL — SIGNIFICANT CHANGE UP (ref 8–20)
CALCIUM SERPL-MCNC: 8.8 MG/DL — SIGNIFICANT CHANGE UP (ref 8.4–10.5)
CHLORIDE SERPL-SCNC: 97 MMOL/L — SIGNIFICANT CHANGE UP (ref 96–108)
CO2 SERPL-SCNC: 29 MMOL/L — SIGNIFICANT CHANGE UP (ref 22–29)
CREAT SERPL-MCNC: 0.77 MG/DL — SIGNIFICANT CHANGE UP (ref 0.5–1.3)
EGFR: 78 ML/MIN/1.73M2 — SIGNIFICANT CHANGE UP
GLUCOSE BLDC GLUCOMTR-MCNC: 132 MG/DL — HIGH (ref 70–99)
GLUCOSE BLDC GLUCOMTR-MCNC: 143 MG/DL — HIGH (ref 70–99)
GLUCOSE BLDC GLUCOMTR-MCNC: 146 MG/DL — HIGH (ref 70–99)
GLUCOSE BLDC GLUCOMTR-MCNC: 155 MG/DL — HIGH (ref 70–99)
GLUCOSE SERPL-MCNC: 119 MG/DL — HIGH (ref 70–99)
HCT VFR BLD CALC: 46.7 % — HIGH (ref 34.5–45)
HGB BLD-MCNC: 15.7 G/DL — HIGH (ref 11.5–15.5)
MCHC RBC-ENTMCNC: 29.8 PG — SIGNIFICANT CHANGE UP (ref 27–34)
MCHC RBC-ENTMCNC: 33.6 GM/DL — SIGNIFICANT CHANGE UP (ref 32–36)
MCV RBC AUTO: 88.8 FL — SIGNIFICANT CHANGE UP (ref 80–100)
PLATELET # BLD AUTO: 374 K/UL — SIGNIFICANT CHANGE UP (ref 150–400)
POTASSIUM SERPL-MCNC: 3.3 MMOL/L — LOW (ref 3.5–5.3)
POTASSIUM SERPL-SCNC: 3.3 MMOL/L — LOW (ref 3.5–5.3)
RBC # BLD: 5.26 M/UL — HIGH (ref 3.8–5.2)
RBC # FLD: 14.2 % — SIGNIFICANT CHANGE UP (ref 10.3–14.5)
SODIUM SERPL-SCNC: 137 MMOL/L — SIGNIFICANT CHANGE UP (ref 135–145)
WBC # BLD: 14.66 K/UL — HIGH (ref 3.8–10.5)
WBC # FLD AUTO: 14.66 K/UL — HIGH (ref 3.8–10.5)

## 2024-07-07 PROCEDURE — 99232 SBSQ HOSP IP/OBS MODERATE 35: CPT

## 2024-07-07 RX ORDER — SACUBITRIL AND VALSARTAN 97; 103 MG/1; MG/1
1 TABLET, FILM COATED ORAL
Refills: 0 | Status: DISCONTINUED | OUTPATIENT
Start: 2024-07-07 | End: 2024-07-11

## 2024-07-07 RX ORDER — POTASSIUM CHLORIDE 600 MG/1
40 TABLET, FILM COATED, EXTENDED RELEASE ORAL EVERY 4 HOURS
Refills: 0 | Status: COMPLETED | OUTPATIENT
Start: 2024-07-07 | End: 2024-07-07

## 2024-07-07 RX ADMIN — ATORVASTATIN CALCIUM 40 MILLIGRAM(S): 20 TABLET, FILM COATED ORAL at 21:30

## 2024-07-07 RX ADMIN — ENOXAPARIN SODIUM 40 MILLIGRAM(S): 100 INJECTION SUBCUTANEOUS at 17:14

## 2024-07-07 RX ADMIN — PREDNISONE 40 MILLIGRAM(S): 10 TABLET ORAL at 05:24

## 2024-07-07 RX ADMIN — SACUBITRIL AND VALSARTAN 1 TABLET(S): 97; 103 TABLET, FILM COATED ORAL at 17:14

## 2024-07-07 RX ADMIN — SACUBITRIL AND VALSARTAN 1 TABLET(S): 97; 103 TABLET, FILM COATED ORAL at 05:24

## 2024-07-07 RX ADMIN — ASPIRIN 81 MILLIGRAM(S): 325 TABLET, FILM COATED ORAL at 11:38

## 2024-07-07 RX ADMIN — Medication 50 MILLIGRAM(S): at 17:14

## 2024-07-07 RX ADMIN — IPRATROPIUM BROMIDE AND ALBUTEROL SULFATE 3 MILLILITER(S): .5; 3 SOLUTION RESPIRATORY (INHALATION) at 21:05

## 2024-07-07 RX ADMIN — IPRATROPIUM BROMIDE AND ALBUTEROL SULFATE 3 MILLILITER(S): .5; 3 SOLUTION RESPIRATORY (INHALATION) at 09:50

## 2024-07-07 RX ADMIN — HYDRALAZINE HYDROCHLORIDE 10 MILLIGRAM(S): 50 TABLET ORAL at 04:12

## 2024-07-07 RX ADMIN — BUDESONIDE 0.5 MILLIGRAM(S): 0.25 INHALANT ORAL at 21:05

## 2024-07-07 RX ADMIN — LEVETIRACETAM 1000 MILLIGRAM(S): 100 INJECTION INTRAVENOUS at 05:24

## 2024-07-07 RX ADMIN — BUDESONIDE 0.5 MILLIGRAM(S): 0.25 INHALANT ORAL at 09:51

## 2024-07-07 RX ADMIN — INSULIN LISPRO 1: 100 INJECTION, SOLUTION SUBCUTANEOUS at 10:21

## 2024-07-07 RX ADMIN — SPIRONOLACTONE 25 MILLIGRAM(S): 25 TABLET ORAL at 05:24

## 2024-07-07 RX ADMIN — LIDOCAINE HCL 1 PATCH: 28 GEL TOPICAL at 07:36

## 2024-07-07 RX ADMIN — MONTELUKAST SODIUM 10 MILLIGRAM(S): 10 TABLET, FILM COATED ORAL at 11:38

## 2024-07-07 RX ADMIN — POLYETHYLENE GLYCOL 3350 17 GRAM(S): 1 POWDER ORAL at 11:39

## 2024-07-07 RX ADMIN — LIDOCAINE HCL 1 PATCH: 28 GEL TOPICAL at 21:30

## 2024-07-07 RX ADMIN — Medication 10 MILLILITER(S): at 21:31

## 2024-07-07 RX ADMIN — POTASSIUM CHLORIDE 40 MILLIEQUIVALENT(S): 600 TABLET, FILM COATED, EXTENDED RELEASE ORAL at 11:38

## 2024-07-07 RX ADMIN — FUROSEMIDE 40 MILLIGRAM(S): 10 INJECTION, SOLUTION INTRAMUSCULAR; INTRAVENOUS at 05:25

## 2024-07-07 RX ADMIN — LIDOCAINE HCL 1 PATCH: 28 GEL TOPICAL at 09:37

## 2024-07-07 RX ADMIN — IPRATROPIUM BROMIDE AND ALBUTEROL SULFATE 3 MILLILITER(S): .5; 3 SOLUTION RESPIRATORY (INHALATION) at 14:30

## 2024-07-07 RX ADMIN — POTASSIUM CHLORIDE 40 MILLIEQUIVALENT(S): 600 TABLET, FILM COATED, EXTENDED RELEASE ORAL at 17:14

## 2024-07-07 RX ADMIN — LEVETIRACETAM 1000 MILLIGRAM(S): 100 INJECTION INTRAVENOUS at 17:13

## 2024-07-07 RX ADMIN — Medication 50 MILLIGRAM(S): at 05:25

## 2024-07-07 RX ADMIN — AMLODIPINE BESYLATE 10 MILLIGRAM(S): 2.5 TABLET ORAL at 05:24

## 2024-07-07 RX ADMIN — SERTRALINE HYDROCHLORIDE 100 MILLIGRAM(S): 100 TABLET, FILM COATED ORAL at 11:38

## 2024-07-07 NOTE — PROGRESS NOTE ADULT - ASSESSMENT
79F with MHx significant for HTN, HLD and CAD s/p stent who takes ASA presented to [facility] this morning with AMS and urinary incontinence and her last known well approximately 530am. CTH at [place] revealed R parietal lobe hematoma    #R Parietal lobe hematoma surrounding vasogenic edema  #New onset seizures  Transfer[facility] from [facility]  MRI Head w/wo reviewed  ASA resumed 7/6  Atorvastatin when able to tolerate PO  KEPPRA 1000mg BID - no driving until outpatient neurology followup   Seizure precautions  Dysphagia  CT neck w/o significant stenosis    #Stridor - resolved   S/p intubation for 22hrs, now on RA  S/p laryngoscopy w concern for subglottic stenosis  Heliox therapy, racemic epi  Steroids- solumedrol 40mg IV q6, change to PO Prednisone x 5 days  ENT eval noted, outpt f/u  CT chest noted with mass effect -- d/w radiation, that is a anatomic variant, no clinical significance     #HTN, HLD and CAD s/p stent  #HFpEF  Entresto, aldactone, lopressor, Amlodipine   Statin  Appreciate cards recs - will c/w PO lasix QD  ASA    #Troponemia  Likely demand from above, trended down and remained flat  No chest pain or ischemic ekg changes    #Psych  Zoloft    #GERD  Pronotix    DVT ppx: Lovenox    S/S eval- Regular diet  PT- Acute rehab pending   Updated patient's family on 7/7

## 2024-07-07 NOTE — PROGRESS NOTE ADULT - SUBJECTIVE AND OBJECTIVE BOX
April Petersen M.D.    Patient is a 79y old  Female who presents with a chief complaint of IPH ?seizure (06 Jul 2024 13:26)      SUBJECTIVE / OVERNIGHT EVENTS: no concerns.     Patient denies chest pain, SOB, abd pain, N/V, fever, chills, dysuria or any other complaints. All remainder ROS negative.     MEDICATIONS  (STANDING):  albuterol/ipratropium for Nebulization 3 milliLiter(s) Nebulizer every 6 hours  amLODIPine   Tablet 10 milliGRAM(s) Oral daily  aspirin  chewable 81 milliGRAM(s) Oral daily  atorvastatin 40 milliGRAM(s) Oral at bedtime  buDESOnide    Inhalation Suspension 0.5 milliGRAM(s) Inhalation every 12 hours  dextrose 10% Bolus 125 milliLiter(s) IV Bolus once  dextrose 5%. 1000 milliLiter(s) (50 mL/Hr) IV Continuous <Continuous>  dextrose 5%. 1000 milliLiter(s) (100 mL/Hr) IV Continuous <Continuous>  dextrose 50% Injectable 25 Gram(s) IV Push once  dextrose 50% Injectable 12.5 Gram(s) IV Push once  enoxaparin Injectable 40 milliGRAM(s) SubCutaneous every 24 hours  furosemide    Tablet 40 milliGRAM(s) Oral daily  glucagon  Injectable 1 milliGRAM(s) IntraMuscular once  insulin lispro (ADMELOG) corrective regimen sliding scale   SubCutaneous three times a day before meals  insulin lispro (ADMELOG) corrective regimen sliding scale   SubCutaneous at bedtime  levETIRAcetam   Injectable 1000 milliGRAM(s) IV Push every 12 hours  lidocaine   4% Patch 1 Patch Transdermal every 24 hours  metoprolol tartrate 50 milliGRAM(s) Oral two times a day  montelukast 10 milliGRAM(s) Oral daily  polyethylene glycol 3350 17 Gram(s) Oral daily  potassium chloride    Tablet ER 40 milliEquivalent(s) Oral every 4 hours  predniSONE   Tablet 40 milliGRAM(s) Oral daily  sacubitril 97 mG/valsartan 103 mG 1 Tablet(s) Oral two times a day  senna Syrup 10 milliLiter(s) Oral at bedtime  sertraline 100 milliGRAM(s) Oral daily  spironolactone 25 milliGRAM(s) Oral daily    MEDICATIONS  (PRN):  acetaminophen     Tablet .. 650 milliGRAM(s) Oral every 6 hours PRN Temp greater or equal to 38C (100.4F), Mild Pain (1 - 3)  benzocaine/menthol Lozenge 1 Lozenge Oral four times a day PRN Sore Throat  dextrose Oral Gel 15 Gram(s) Oral once PRN Blood Glucose LESS THAN 70 milliGRAM(s)/deciliter  hydrALAZINE Injectable 10 milliGRAM(s) IV Push every 2 hours PRN Sbp >160  labetalol Injectable 10 milliGRAM(s) IV Push every 2 hours PRN SBP>160  levalbuterol Inhalation 0.63 milliGRAM(s) Inhalation every 6 hours PRN SOB/wheezing      I&O's Summary    06 Jul 2024 07:01  -  07 Jul 2024 07:00  --------------------------------------------------------  IN: 1125 mL / OUT: 1850 mL / NET: -725 mL        PHYSICAL EXAM:  Vital Signs Last 24 Hrs  T(C): 36.6 (07 Jul 2024 07:00), Max: 36.9 (06 Jul 2024 16:32)  T(F): 97.9 (07 Jul 2024 07:00), Max: 98.4 (06 Jul 2024 16:32)  HR: 75 (07 Jul 2024 08:00) (68 - 86)  BP: 144/84 (07 Jul 2024 08:00) (98/77 - 181/95)  BP(mean): 102 (07 Jul 2024 06:00) (81 - 120)  RR: 18 (07 Jul 2024 08:00) (16 - 18)  SpO2: 93% (07 Jul 2024 08:00) (93% - 100%)    Parameters below as of 07 Jul 2024 08:00  Patient On (Oxygen Delivery Method): room air      CONSTITUTIONAL: NAD, well-groomed  RESPIRATORY: Normal respiratory effort; lungs are clear to auscultation bilaterally  CARDIOVASCULAR: Regular rate and rhythm, no LE edema  ABDOMEN: Nontender to palpation, normoactive bowel sounds  PSYCH: A+O x3; affect appropriate  NEUROLOGY: CN 2-12 are intact and symmetric; no gross sensory deficits    LABS:                        15.7   14.66 )-----------( 374      ( 07 Jul 2024 08:30 )             46.7     07-07    137  |  97  |  19.9  ----------------------------<  119<H>  3.3<L>   |  29.0  |  0.77    Ca    8.8      07 Jul 2024 08:30    TPro  6.1<L>  /  Alb  3.2<L>  /  TBili  0.7  /  DBili  x   /  AST  25  /  ALT  32  /  AlkPhos  70  07-06          Urinalysis Basic - ( 07 Jul 2024 08:30 )    Color: x / Appearance: x / SG: x / pH: x  Gluc: 119 mg/dL / Ketone: x  / Bili: x / Urobili: x   Blood: x / Protein: x / Nitrite: x   Leuk Esterase: x / RBC: x / WBC x   Sq Epi: x / Non Sq Epi: x / Bacteria: x        CAPILLARY BLOOD GLUCOSE      POCT Blood Glucose.: 155 mg/dL (07 Jul 2024 09:53)  POCT Blood Glucose.: 118 mg/dL (06 Jul 2024 21:48)  POCT Blood Glucose.: 119 mg/dL (06 Jul 2024 17:06)      RADIOLOGY & ADDITIONAL TESTS:  Results Reviewed:   Imaging Personally Reviewed:  Electrocardiogram Personally Reviewed:

## 2024-07-08 ENCOUNTER — TRANSCRIPTION ENCOUNTER (OUTPATIENT)
Age: 79
End: 2024-07-08

## 2024-07-08 LAB
ANION GAP SERPL CALC-SCNC: 12 MMOL/L — SIGNIFICANT CHANGE UP (ref 5–17)
BUN SERPL-MCNC: 20.9 MG/DL — HIGH (ref 8–20)
CALCIUM SERPL-MCNC: 8.6 MG/DL — SIGNIFICANT CHANGE UP (ref 8.4–10.5)
CHLORIDE SERPL-SCNC: 98 MMOL/L — SIGNIFICANT CHANGE UP (ref 96–108)
CO2 SERPL-SCNC: 26 MMOL/L — SIGNIFICANT CHANGE UP (ref 22–29)
CREAT SERPL-MCNC: 0.78 MG/DL — SIGNIFICANT CHANGE UP (ref 0.5–1.3)
EGFR: 77 ML/MIN/1.73M2 — SIGNIFICANT CHANGE UP
GLUCOSE BLDC GLUCOMTR-MCNC: 101 MG/DL — HIGH (ref 70–99)
GLUCOSE BLDC GLUCOMTR-MCNC: 117 MG/DL — HIGH (ref 70–99)
GLUCOSE BLDC GLUCOMTR-MCNC: 133 MG/DL — HIGH (ref 70–99)
GLUCOSE BLDC GLUCOMTR-MCNC: 178 MG/DL — HIGH (ref 70–99)
GLUCOSE SERPL-MCNC: 106 MG/DL — HIGH (ref 70–99)
HCT VFR BLD CALC: 42.4 % — SIGNIFICANT CHANGE UP (ref 34.5–45)
HGB BLD-MCNC: 14.1 G/DL — SIGNIFICANT CHANGE UP (ref 11.5–15.5)
INTERPRETATION SERPL IFE-IMP: SIGNIFICANT CHANGE UP
MAGNESIUM SERPL-MCNC: 1.8 MG/DL — SIGNIFICANT CHANGE UP (ref 1.6–2.6)
MCHC RBC-ENTMCNC: 29.6 PG — SIGNIFICANT CHANGE UP (ref 27–34)
MCHC RBC-ENTMCNC: 33.3 GM/DL — SIGNIFICANT CHANGE UP (ref 32–36)
MCV RBC AUTO: 88.9 FL — SIGNIFICANT CHANGE UP (ref 80–100)
PLATELET # BLD AUTO: 338 K/UL — SIGNIFICANT CHANGE UP (ref 150–400)
POTASSIUM SERPL-MCNC: 3.9 MMOL/L — SIGNIFICANT CHANGE UP (ref 3.5–5.3)
POTASSIUM SERPL-SCNC: 3.9 MMOL/L — SIGNIFICANT CHANGE UP (ref 3.5–5.3)
RBC # BLD: 4.77 M/UL — SIGNIFICANT CHANGE UP (ref 3.8–5.2)
RBC # FLD: 14.2 % — SIGNIFICANT CHANGE UP (ref 10.3–14.5)
SODIUM SERPL-SCNC: 136 MMOL/L — SIGNIFICANT CHANGE UP (ref 135–145)
WBC # BLD: 11.76 K/UL — HIGH (ref 3.8–10.5)
WBC # FLD AUTO: 11.76 K/UL — HIGH (ref 3.8–10.5)

## 2024-07-08 PROCEDURE — 99232 SBSQ HOSP IP/OBS MODERATE 35: CPT

## 2024-07-08 PROCEDURE — 73200 CT UPPER EXTREMITY W/O DYE: CPT | Mod: 26,LT

## 2024-07-08 PROCEDURE — 99233 SBSQ HOSP IP/OBS HIGH 50: CPT

## 2024-07-08 RX ORDER — MAGNESIUM SULFATE 100 %
1 POWDER (GRAM) MISCELLANEOUS ONCE
Refills: 0 | Status: COMPLETED | OUTPATIENT
Start: 2024-07-08 | End: 2024-07-08

## 2024-07-08 RX ORDER — METOPROLOL TARTRATE 50 MG
75 TABLET ORAL
Refills: 0 | Status: DISCONTINUED | OUTPATIENT
Start: 2024-07-08 | End: 2024-07-11

## 2024-07-08 RX ADMIN — ASPIRIN 81 MILLIGRAM(S): 325 TABLET, FILM COATED ORAL at 11:30

## 2024-07-08 RX ADMIN — BUDESONIDE 0.5 MILLIGRAM(S): 0.25 INHALANT ORAL at 20:21

## 2024-07-08 RX ADMIN — AMLODIPINE BESYLATE 10 MILLIGRAM(S): 2.5 TABLET ORAL at 05:08

## 2024-07-08 RX ADMIN — Medication 650 MILLIGRAM(S): at 11:36

## 2024-07-08 RX ADMIN — LIDOCAINE HCL 1 PATCH: 28 GEL TOPICAL at 23:03

## 2024-07-08 RX ADMIN — Medication 50 MILLIGRAM(S): at 05:08

## 2024-07-08 RX ADMIN — IPRATROPIUM BROMIDE AND ALBUTEROL SULFATE 3 MILLILITER(S): .5; 3 SOLUTION RESPIRATORY (INHALATION) at 14:34

## 2024-07-08 RX ADMIN — Medication 100 GRAM(S): at 11:30

## 2024-07-08 RX ADMIN — SPIRONOLACTONE 25 MILLIGRAM(S): 25 TABLET ORAL at 05:07

## 2024-07-08 RX ADMIN — Medication 75 MILLIGRAM(S): at 16:42

## 2024-07-08 RX ADMIN — IPRATROPIUM BROMIDE AND ALBUTEROL SULFATE 3 MILLILITER(S): .5; 3 SOLUTION RESPIRATORY (INHALATION) at 20:21

## 2024-07-08 RX ADMIN — PREDNISONE 40 MILLIGRAM(S): 10 TABLET ORAL at 05:07

## 2024-07-08 RX ADMIN — LIDOCAINE HCL 1 PATCH: 28 GEL TOPICAL at 08:28

## 2024-07-08 RX ADMIN — ENOXAPARIN SODIUM 40 MILLIGRAM(S): 100 INJECTION SUBCUTANEOUS at 16:41

## 2024-07-08 RX ADMIN — SACUBITRIL AND VALSARTAN 1 TABLET(S): 97; 103 TABLET, FILM COATED ORAL at 05:07

## 2024-07-08 RX ADMIN — INSULIN LISPRO 1: 100 INJECTION, SOLUTION SUBCUTANEOUS at 13:10

## 2024-07-08 RX ADMIN — LEVETIRACETAM 1000 MILLIGRAM(S): 100 INJECTION INTRAVENOUS at 16:42

## 2024-07-08 RX ADMIN — SERTRALINE HYDROCHLORIDE 100 MILLIGRAM(S): 100 TABLET, FILM COATED ORAL at 11:30

## 2024-07-08 RX ADMIN — POLYETHYLENE GLYCOL 3350 17 GRAM(S): 1 POWDER ORAL at 11:30

## 2024-07-08 RX ADMIN — Medication 650 MILLIGRAM(S): at 12:10

## 2024-07-08 RX ADMIN — SACUBITRIL AND VALSARTAN 1 TABLET(S): 97; 103 TABLET, FILM COATED ORAL at 16:42

## 2024-07-08 RX ADMIN — LIDOCAINE HCL 1 PATCH: 28 GEL TOPICAL at 10:00

## 2024-07-08 RX ADMIN — IPRATROPIUM BROMIDE AND ALBUTEROL SULFATE 3 MILLILITER(S): .5; 3 SOLUTION RESPIRATORY (INHALATION) at 08:26

## 2024-07-08 RX ADMIN — ATORVASTATIN CALCIUM 40 MILLIGRAM(S): 20 TABLET, FILM COATED ORAL at 23:04

## 2024-07-08 RX ADMIN — BUDESONIDE 0.5 MILLIGRAM(S): 0.25 INHALANT ORAL at 08:27

## 2024-07-08 RX ADMIN — Medication 10 MILLILITER(S): at 23:04

## 2024-07-08 RX ADMIN — FUROSEMIDE 40 MILLIGRAM(S): 10 INJECTION, SOLUTION INTRAMUSCULAR; INTRAVENOUS at 05:07

## 2024-07-08 RX ADMIN — MONTELUKAST SODIUM 10 MILLIGRAM(S): 10 TABLET, FILM COATED ORAL at 11:37

## 2024-07-08 RX ADMIN — LEVETIRACETAM 1000 MILLIGRAM(S): 100 INJECTION INTRAVENOUS at 05:07

## 2024-07-08 NOTE — PROGRESS NOTE ADULT - SUBJECTIVE AND OBJECTIVE BOX
April Petersen M.D.    Patient is a 79y old  Female who presents with a chief complaint of IPH ?seizure (07 Jul 2024 11:50)      SUBJECTIVE / OVERNIGHT EVENTS: no event overnight.     Patient denies chest pain, SOB, abd pain, N/V, fever, chills, dysuria or any other complaints. All remainder ROS negative.     MEDICATIONS  (STANDING):  albuterol/ipratropium for Nebulization 3 milliLiter(s) Nebulizer every 6 hours  amLODIPine   Tablet 10 milliGRAM(s) Oral daily  aspirin  chewable 81 milliGRAM(s) Oral daily  atorvastatin 40 milliGRAM(s) Oral at bedtime  buDESOnide    Inhalation Suspension 0.5 milliGRAM(s) Inhalation every 12 hours  dextrose 10% Bolus 125 milliLiter(s) IV Bolus once  dextrose 5%. 1000 milliLiter(s) (50 mL/Hr) IV Continuous <Continuous>  dextrose 5%. 1000 milliLiter(s) (100 mL/Hr) IV Continuous <Continuous>  dextrose 50% Injectable 25 Gram(s) IV Push once  dextrose 50% Injectable 12.5 Gram(s) IV Push once  enoxaparin Injectable 40 milliGRAM(s) SubCutaneous every 24 hours  furosemide    Tablet 40 milliGRAM(s) Oral daily  glucagon  Injectable 1 milliGRAM(s) IntraMuscular once  insulin lispro (ADMELOG) corrective regimen sliding scale   SubCutaneous at bedtime  insulin lispro (ADMELOG) corrective regimen sliding scale   SubCutaneous three times a day before meals  levETIRAcetam   Injectable 1000 milliGRAM(s) IV Push every 12 hours  lidocaine   4% Patch 1 Patch Transdermal every 24 hours  metoprolol tartrate 75 milliGRAM(s) Oral two times a day  montelukast 10 milliGRAM(s) Oral daily  polyethylene glycol 3350 17 Gram(s) Oral daily  predniSONE   Tablet 40 milliGRAM(s) Oral daily  sacubitril 97 mG/valsartan 103 mG 1 Tablet(s) Oral two times a day  senna Syrup 10 milliLiter(s) Oral at bedtime  sertraline 100 milliGRAM(s) Oral daily  spironolactone 25 milliGRAM(s) Oral daily    MEDICATIONS  (PRN):  acetaminophen     Tablet .. 650 milliGRAM(s) Oral every 6 hours PRN Temp greater or equal to 38C (100.4F), Mild Pain (1 - 3)  benzocaine/menthol Lozenge 1 Lozenge Oral four times a day PRN Sore Throat  cyclobenzaprine 5 milliGRAM(s) Oral three times a day PRN Muscle Spasm  dextrose Oral Gel 15 Gram(s) Oral once PRN Blood Glucose LESS THAN 70 milliGRAM(s)/deciliter  hydrALAZINE Injectable 10 milliGRAM(s) IV Push every 2 hours PRN Sbp >160  labetalol Injectable 10 milliGRAM(s) IV Push every 2 hours PRN SBP>160  levalbuterol Inhalation 0.63 milliGRAM(s) Inhalation every 6 hours PRN SOB/wheezing      I&O's Summary    07 Jul 2024 07:01  -  08 Jul 2024 07:00  --------------------------------------------------------  IN: 960 mL / OUT: 500 mL / NET: 460 mL    08 Jul 2024 07:01  -  08 Jul 2024 12:36  --------------------------------------------------------  IN: 200 mL / OUT: 350 mL / NET: -150 mL        PHYSICAL EXAM:  Vital Signs Last 24 Hrs  T(C): 36.7 (08 Jul 2024 08:00), Max: 37.1 (07 Jul 2024 16:04)  T(F): 98.1 (08 Jul 2024 08:00), Max: 98.8 (08 Jul 2024 00:02)  HR: 82 (08 Jul 2024 12:00) (58 - 82)  BP: 127/79 (08 Jul 2024 10:00) (105/85 - 150/75)  BP(mean): 99 (08 Jul 2024 04:00) (94 - 103)  RR: 18 (08 Jul 2024 12:00) (16 - 19)  SpO2: 98% (08 Jul 2024 12:00) (94% - 99%)    Parameters below as of 08 Jul 2024 12:00  Patient On (Oxygen Delivery Method): room air    CONSTITUTIONAL: NAD, well-groomed  RESPIRATORY: Normal respiratory effort; lungs are clear to auscultation bilaterally  CARDIOVASCULAR: Regular rate and rhythm, no LE edema  ABDOMEN: Nontender to palpation, normoactive bowel sounds  MSK: TTP of the left shoulder with movement, especially when lifting above horizontal   PSYCH: A+O x3; affect appropriate  NEUROLOGY: CN 2-12 are intact and symmetric; no gross sensory deficits    LABS:                        14.1   11.76 )-----------( 338      ( 08 Jul 2024 04:59 )             42.4     07-08    136  |  98  |  20.9<H>  ----------------------------<  106<H>  3.9   |  26.0  |  0.78    Ca    8.6      08 Jul 2024 04:59  Mg     1.8     07-08        Urinalysis Basic - ( 08 Jul 2024 04:59 )    Color: x / Appearance: x / SG: x / pH: x  Gluc: 106 mg/dL / Ketone: x  / Bili: x / Urobili: x   Blood: x / Protein: x / Nitrite: x   Leuk Esterase: x / RBC: x / WBC x   Sq Epi: x / Non Sq Epi: x / Bacteria: x        CAPILLARY BLOOD GLUCOSE      POCT Blood Glucose.: 133 mg/dL (08 Jul 2024 08:59)  POCT Blood Glucose.: 132 mg/dL (07 Jul 2024 21:29)  POCT Blood Glucose.: 146 mg/dL (07 Jul 2024 17:12)  POCT Blood Glucose.: 143 mg/dL (07 Jul 2024 13:04)      RADIOLOGY & ADDITIONAL TESTS:  Results Reviewed:   Imaging Personally Reviewed:  Electrocardiogram Personally Reviewed: Health Care Proxy (HCP)

## 2024-07-08 NOTE — DISCHARGE NOTE PROVIDER - PROVIDER TOKENS
PROVIDER:[TOKEN:[[id number]]] PROVIDER:[TOKEN:[[id number]]],PROVIDER:[TOKEN:[[id number]]],PROVIDER:[TOKEN:[[id number]]],PROVIDER:[TOKEN:[[id number]]]

## 2024-07-08 NOTE — OCCUPATIONAL THERAPY INITIAL EVALUATION ADULT - PERTINENT HX OF CURRENT PROBLEM, REHAB EVAL
Pt with AMS and urinary continence and was transferred from [facility]
Of note, pt received 3mg ativan, haldol and 1g keppra prior to arrival at [facility] ED for both agitation and potential seizures. Once in ED, ED providers concerned for pt's airway as she was gargling with respirations. Decision made by ED staff to intubate patient for airway protection.

## 2024-07-08 NOTE — PROGRESS NOTE ADULT - ASSESSMENT
79F with MHx significant for HTN, HLD and CAD s/p stent who takes ASA presented to [facility] this morning with AMS and urinary incontinence and her last known well approximately 530am. CTH at [place] revealed R parietal lobe hematoma    #R Parietal lobe hematoma surrounding vasogenic edema  #New onset seizures  Transferred from [facility]  MRI Head w/wo reviewed  ASA resumed 7/6  Atorvastatin when able to tolerate PO  KEPPRA 1000mg BID - no driving until outpatient neurology followup   Seizure precautions  CT neck w/o significant stenosis    #Left shoulder pain  xray without acute findings  CT shoulder pending   lidocaine patch, add PRN flexiril   #Stridor - resolved   S/p intubation for 22hrs, now on RA  S/p laryngoscopy w concern for subglottic stenosis  Heliox therapy, racemic epi  Steroids- solumedrol 40mg IV q6, change to PO Prednisone x 5 days  ENT eval noted, outpt f/u  CT chest noted with mass effect -- d/w radiation, that is a anatomic variant, no clinical significance     #HTN, HLD and CAD s/p stent  #HFpEF  Entresto, aldactone, lopressor, Amlodipine   Statin  Appreciate cards recs - will c/w PO lasix QD  ASA    #Troponemia  Likely demand from above, trended down and remained flat  No chest pain or ischemic ekg changes    #Psych  Zoloft    #GERD  Pronotix    DVT ppx: Lovenox    S/S eval- Regular diet  PT- Acute rehab pending CT shoulder   Updated patient's family on 7/8   79F with MHx significant for HTN, HLD and CAD s/p stent who takes ASA presented to [facility] this morning with AMS and urinary incontinence and her last known well approximately 530am. CTH at [place] revealed R parietal lobe hematoma    #R Parietal lobe hematoma surrounding vasogenic edema  #New onset seizures  Transferred from [facility]  MRI Head w/wo reviewed  ASA resumed 7/6  Atorvastatin when able to tolerate PO  KEPPRA 1000mg BID - no driving until outpatient neurology followup   Seizure precautions  CT neck w/o significant stenosis    #Left shoulder pain - reports pain after moving off a stretcher, active ROM limited   xray without acute findings  CT shoulder pending   lidocaine patch, add PRN flexiril     #Stridor - resolved   S/p intubation for 22hrs, now on RA  S/p laryngoscopy w concern for subglottic stenosis  Heliox therapy, racemic epi  Steroids- solumedrol 40mg IV q6, change to PO Prednisone x 5 days  ENT eval noted, outpt f/u  CT chest noted with mass effect -- d/w radiation, that is a anatomic variant, no clinical significance     #HTN, HLD and CAD s/p stent  #HFpEF  Entresto, aldactone, lopressor, Amlodipine   Statin  Appreciate cards recs - will c/w PO lasix QD  ASA    #Troponemia  Likely demand from above, trended down and remained flat  No chest pain or ischemic ekg changes    #Psych  Zoloft    #GERD  Pronotix    DVT ppx: Lovenox    S/S eval- Regular diet  PT- Acute rehab pending CT shoulder   Updated patient's family on 7/8

## 2024-07-08 NOTE — OCCUPATIONAL THERAPY INITIAL EVALUATION ADULT - SOCIAL CONCERNS
Telephone call to patient. Left voicemail of purpose of call with request for return phone call. Call back number was provided.
Complex psychosocial needs/coping issues
Complex psychosocial needs/coping issues

## 2024-07-08 NOTE — DISCHARGE NOTE PROVIDER - NSDCMRMEDTOKEN_GEN_ALL_CORE_FT
aspirin 81 mg oral tablet: orally once a day  losartan 50 mg oral tablet: 1 tab(s) orally once a day  Metoprolol Tartrate 50 mg oral tablet: 1 tab(s) orally 2 times a day  montelukast 10 mg oral tablet: 1 tab(s) orally once a day  rosuvastatin 10 mg oral tablet: 1 tab(s) orally once a day (at bedtime)  sertraline 100 mg oral tablet: 1 tab(s) orally once a day   amLODIPine 10 mg oral tablet: 1 tab(s) orally once a day  aspirin 81 mg oral tablet: orally once a day  cyclobenzaprine 5 mg oral tablet: 1 tab(s) orally 3 times a day As needed Muscle Spasm  furosemide 40 mg oral tablet: 1 tab(s) orally once a day  Keppra 1000 mg oral tablet: 1 tab(s) orally 2 times a day  metoprolol tartrate 75 mg oral tablet: 1 tab(s) orally 2 times a day  montelukast 10 mg oral tablet: 1 tab(s) orally once a day  polyethylene glycol 3350 oral powder for reconstitution: 17 gram(s) orally once a day  rosuvastatin 10 mg oral tablet: 1 tab(s) orally once a day (at bedtime)  sacubitril-valsartan 97 mg-103 mg oral tablet: 1 tab(s) orally 2 times a day  senna (sennosides) 8.8 mg/5 mL oral syrup: 10 milliliter(s) orally once a day (at bedtime)  sertraline 100 mg oral tablet: 1 tab(s) orally once a day  spironolactone 25 mg oral tablet: 1 tab(s) orally once a day

## 2024-07-08 NOTE — OCCUPATIONAL THERAPY INITIAL EVALUATION ADULT - VISUAL ACUITY
+reading glasses, pt reports bilateral mild blurriness; RN aware. Central and peripheral fields grossly WFL bilaterally
no vision c/o offered; pt wears reading glasses

## 2024-07-08 NOTE — DISCHARGE NOTE PROVIDER - ATTENDING DISCHARGE PHYSICAL EXAMINATION:
CONSTITUTIONAL: NAD, pleasant  RESPIRATORY: Normal respiratory effort; lungs are clear to auscultation bilaterally  CARDIOVASCULAR: Regular rate and rhythm, no LE edema  ABDOMEN: Nontender to palpation, normoactive bowel sounds  MSK: TTP of the left shoulder with movement, especially when lifting above horizontal   PSYCH: A+O x3; affect appropriate  NEUROLOGY: CN 2-12 are intact and symmetric; no gross sensory deficits

## 2024-07-08 NOTE — DISCHARGE NOTE PROVIDER - CARE PROVIDERS DIRECT ADDRESSES
,[email or web address]

## 2024-07-08 NOTE — CONSULT NOTE ADULT - CONSULT REQUESTED DATE/TIME
08-Jul-2024 16:56
02-Jul-2024 11:52
02-Jul-2024 12:00
29-Jun-2024 12:15
30-Jun-2024 11:53
02-Jul-2024 12:28
02-Jul-2024 10:45
29-Jun-2024 16:33

## 2024-07-08 NOTE — PROGRESS NOTE ADULT - SUBJECTIVE AND OBJECTIVE BOX
Patient OOB, daughter at bedside.    FUNCTIONAL PROGRESS  7/7 PT  Bed Mobility  Bed Mobility Training Sit-to-Supine: minimum assist (75% patient effort);  1 person assist  Bed Mobility Training Supine-to-Sit: minimum assist (75% patient effort);  1 person assist  Bed Mobility Training Limitations: decreased strength;  impaired coordination    Sit-Stand Transfer Training  Transfer Training Sit-to-Stand Transfer: minimum assist (75% patient effort);  1 person assist  Transfer Training Stand-to-Sit Transfer: minimum assist (75% patient effort);  1 person assist  Sit-to-Stand Transfer Training Transfer Safety Analysis: decreased strength    Gait Training  Gait Training: minimum assist (75% patient effort);  1 person assist;  verbal cues;  rolling walker;  40ft  Gait Analysis: 2-point gait   decreased shin;  decreased weight-shifting ability      VITALS  T(C): 36.7 (07-08-24 @ 08:00), Max: 37.1 (07-07-24 @ 16:04)  HR: 82 (07-08-24 @ 10:00) (58 - 82)  BP: 127/79 (07-08-24 @ 10:00) (105/85 - 150/75)  RR: 19 (07-08-24 @ 10:00) (16 - 19)  SpO2: 96% (07-08-24 @ 08:26) (94% - 99%)  Wt(kg): --    MEDICATIONS   acetaminophen     Tablet .. 650 milliGRAM(s) every 6 hours PRN  albuterol/ipratropium for Nebulization 3 milliLiter(s) every 6 hours  amLODIPine   Tablet 10 milliGRAM(s) daily  aspirin  chewable 81 milliGRAM(s) daily  atorvastatin 40 milliGRAM(s) at bedtime  benzocaine/menthol Lozenge 1 Lozenge four times a day PRN  buDESOnide    Inhalation Suspension 0.5 milliGRAM(s) every 12 hours  dextrose 10% Bolus 125 milliLiter(s) once  dextrose 5%. 1000 milliLiter(s) <Continuous>  dextrose 5%. 1000 milliLiter(s) <Continuous>  dextrose 50% Injectable 12.5 Gram(s) once  dextrose 50% Injectable 25 Gram(s) once  dextrose Oral Gel 15 Gram(s) once PRN  enoxaparin Injectable 40 milliGRAM(s) every 24 hours  furosemide    Tablet 40 milliGRAM(s) daily  glucagon  Injectable 1 milliGRAM(s) once  hydrALAZINE Injectable 10 milliGRAM(s) every 2 hours PRN  insulin lispro (ADMELOG) corrective regimen sliding scale   at bedtime  insulin lispro (ADMELOG) corrective regimen sliding scale   three times a day before meals  labetalol Injectable 10 milliGRAM(s) every 2 hours PRN  levalbuterol Inhalation 0.63 milliGRAM(s) every 6 hours PRN  levETIRAcetam   Injectable 1000 milliGRAM(s) every 12 hours  lidocaine   4% Patch 1 Patch every 24 hours  magnesium sulfate  IVPB 1 Gram(s) once  metoprolol tartrate 75 milliGRAM(s) two times a day  montelukast 10 milliGRAM(s) daily  polyethylene glycol 3350 17 Gram(s) daily  predniSONE   Tablet 40 milliGRAM(s) daily  sacubitril 97 mG/valsartan 103 mG 1 Tablet(s) two times a day  senna Syrup 10 milliLiter(s) at bedtime  sertraline 100 milliGRAM(s) daily  spironolactone 25 milliGRAM(s) daily      RECENT LABS/IMAGING  - Reviewed Today                        14.1   11.76 )-----------( 338      ( 08 Jul 2024 04:59 )             42.4     07-08    136  |  98  |  20.9<H>  ----------------------------<  106<H>  3.9   |  26.0  |  0.78    Ca    8.6      08 Jul 2024 04:59  Mg     1.8     07-08        Urinalysis Basic - ( 08 Jul 2024 04:59 )    Color: x / Appearance: x / SG: x / pH: x  Gluc: 106 mg/dL / Ketone: x  / Bili: x / Urobili: x   Blood: x / Protein: x / Nitrite: x   Leuk Esterase: x / RBC: x / WBC x   Sq Epi: x / Non Sq Epi: x / Bacteria: x              HEAD CT, CTA HEAD & NECK 6/29 - Age-appropriate involutional and ischemic gliotic changes. No change in right parietal parenchymal hemorrhage since 7:42 AM. Normal CTA of the head and neck.    HEAD CT 6/30 - No change since 6/29/2024. Right small right parietal cortical parenchymal hemorrhage.    CXR 7/2 - Clear lungs on the latest film.    MRI HEAD 7/4 - Small parenchymal hemorrhage peripherally within the right parietal lobe with mild surrounding edema. No definitive enhancement, underlying lesion, or associated abnormal flow voids. Please note the presence of increased T1 signal precontrast somewhat limits evaluation for enhancement.Multifocal areas of hemosiderin compatible with prior microhemorrhages. Multiple chronic lacunar infarcts. Moderate chronic white matter ischemic changes. Findings likely related to long-standing hypertension.9 mm enhancing nodule superficial lobe right parotid gland  ----------------------------------------------------------------------------------------  PHYSICAL EXAM  Constitutional - NAD, Comfortable   Extremities - No calf tenderness   Neurologic Exam -                    Cognitive - AAO to self, place, date, year, situation     Communication - Fluent, Mild dysarthria     Cranial Nerves - CN 2-12 intact     FUNCTIONAL MOTOR EXAM - Mild deficits of the left UE      Sensory - Intact to LT   Psychiatric - Fatigued  ----------------------------------------------------------------------------------------  ASSESSMENT/PLAN  79yFemale with functional deficits after developing an IPH  Right parietal IPH - Stable, Convert Keppra IV to PO  Acute CHF/Uncontrolled HTN - Lopressor, Entresto, Norvasc, Lasix, Recommend taper Hydralazine IV & Labetalol IV  CAD - Lipitor, ASA  Pulm - Duoneb, Pulmicort, Lasix, Xopenex  Mood - Zoloft  Pain - Tylenol  DVT PPX - SCDs, Lovenox  Rehab/Impaired mobility and function - Patient continues to require hospitalization for the above diagnoses and ongoing active management of comorbid complications (patient with acute onset CHF with IV meds to manage pulmonary status, also with SBP in 160s this AM and dropping to 90s) that are substantially posing a threat to bodily function, functional ability and quality of life.     RECOMMEND - OOB daily     When medically optimized, based on the patient's diagnosis, current functional status and potential for progress, recommend ACUTE inpatient rehabilitation for the functional deficits consisting of 3 hours of therapy/day & 24 hour RN/daily PMR physician for active comorbid medical management. Patient will be able to tolerate 3 hours a day. Spoke to CM about planning recommendations.      Will continue to follow. Rehab recommendations are dependent on how functional progress changes as well as how patient continues to participate and tolerate therapeutic interventions, WHICH MAY CHANGE UPON FOLLOW UP EXAMINATIONS. Recommend ongoing mobilization by staff to maintain cardiopulmonary function and prevention of secondary complications related to debility/immobility. Discussed the specific management and recommendations above with rehab clinical care team/rehab liaison.

## 2024-07-08 NOTE — DISCHARGE NOTE PROVIDER - CARE PROVIDER_API CALL
Eugene Foster  Orthopaedic Surgery  [address] [place] [id number]  Phone: [phone]  Fax: [phone]  Follow Up Time:    Eugene Foster  Orthopaedic Surgery  [address] [place] [address]  Phone: [phone]  Fax: [phone]  Follow Up Time:     Michael eD  Otolaryngology  [address]  [place] [address]  Phone: [phone]  Fax: [phone]  Follow Up Time:     Willem De Souza  Cardiovascular Disease  [address] [facility], [address]  [place] [address]  Phone: [phone]  Fax: [phone]  Follow Up Time:     Rula Lenzar  Neurology  [address] [place] [address]  Phone: [phone]  Fax: [phone]  Follow Up Time:

## 2024-07-08 NOTE — OCCUPATIONAL THERAPY INITIAL EVALUATION ADULT - IMPAIRED TRANSFERS: SIT/STAND, REHAB EVAL
impaired balance/impaired motor control/decreased strength
impaired balance/impaired coordination/pain/impaired postural control/decreased strength

## 2024-07-08 NOTE — OCCUPATIONAL THERAPY INITIAL EVALUATION ADULT - NAME OF CLINICIAN
Pt. brought to [facility] ED by [facility] ambulance. Pt. has right sided body pain since Saturday. Saw [facility] and MD attributed to chronic arthritis. Pt., also, has history of CKD. Current c/o right hip pain radiating to right knee. NAD noted. Pt. appears comfortable.
[employer]
RN, Dayami Johnson

## 2024-07-08 NOTE — OCCUPATIONAL THERAPY INITIAL EVALUATION ADULT - LEVEL OF INDEPENDENCE: STAND/SIT, REHAB EVAL
Detail Level: Zone
Detail Level: Detailed
moderate assist (50% patients effort)
minimum assist (75% patients effort)

## 2024-07-08 NOTE — DISCHARGE NOTE PROVIDER - HOSPITAL COURSE
79F with MHx significant for HTN, HLD and CAD s/p stent who takes ASA presented to [facility] with AMS and urinary incontinence , pt was unresponsive, foaming at mouth, clenching jaw, snoring respirations and tongue biting.  CTH at [place] revealed R parietal lobe hematoma. Then transferred to [facility] neuro-ICU for c-EEG and management where patient recieved 3mg ativan, haldol and 1g keppra prior to arrival at [facility] ED for both agitation and potential seizures. in ED patient was gargling with respirations and was intubated but hemodynamically stable. BNP largely increased to >7000.    Cardiology was consulted for the elevated BNP and troponin. Pt was hypertensive and likely type 2 NSTEMI.  ENT was consulted and laryngoscopy was performed w/ concern for subglottic stenosis. Patient extubated 6/30/24.   79F with MHx significant for HTN, HLD and CAD s/p stent who takes ASA presented to [facility] with AMS and urinary incontinence , pt was unresponsive, foaming at mouth, clenching jaw, snoring respirations and tongue biting.  CTH at [place] revealed R parietal lobe hematoma. Then transferred to [facility] neuro-ICU for c-EEG and management where patient recieved 3mg ativan, haldol and 1g keppra prior to arrival at [facility] ED for both agitation and potential seizures. in ED patient was gargling with respirations and was intubated but hemodynamically stable. Patient extubated 6/30/24. BNP largely increased to >7000.  Cardiology was consulted for the elevated BNP and troponin. Pt was hypertensive and likely type 2 NSTEMI. ENT was consulted and laryngoscopy was performed w/ concern for subglottic stenosis. . Ortho was consulted fro Left shoulder pain and evaluated for acute vs subacute L glenoid fracture.   79F with MHx significant for HTN, HLD and CAD s/p stent who takes ASA presented to [facility] with AMS and urinary incontinence. Once at [place], CTH revealed R parietal lobe hematoma.  Pt was therefore transferred to [facility] neuro-ICU for c-EEG and further mgt. In ED patient was gargling with respirations and was intubated but hemodynamically stable. Neuro ICU stay complicated by new onset seizures. Pt was started on keppra. Patient extubated 6/30/24 and eventually downgraded to medicine. Hospital course complicated by stridor episodes. ENT was consulted, s/p laryngosopy w/ concern for subglottic stenosis. CT neck w/o subglotic stenosis. S/p heliox therapy, racemic epi, IV steroids with resolution of stridor. Additionally, pt complained of left shoulder pain. Xrays negative. CT however revealed acute vs subacute L glenoid fx. Pt was seen in consult by ortho, no surgical intervention recommended,  WBAT LUE with sling for comfort.

## 2024-07-08 NOTE — DISCHARGE NOTE PROVIDER - NSDCFUADDINST_GEN_ALL_CORE_FT
Weight bearing as tolerated left upper extremity. Sling for comfort. Follow-up with Dr. Foster outpatient in 1-2 weeks.

## 2024-07-08 NOTE — OCCUPATIONAL THERAPY INITIAL EVALUATION ADULT - LIVES WITH, PROFILE
children/spouse
 and daughter, in a private house with 1 step to enter and flight inside with railing that she does not have to negotiate; BED/BR on main level/children/spouse

## 2024-07-08 NOTE — OCCUPATIONAL THERAPY INITIAL EVALUATION ADULT - ADDITIONAL COMMENTS
yes
Pt has shower stall with doors and jacuzzi tub  Pt owns a cane  Pt is right handed
Obtained information from daughter at bedside - pt lives in private home with her  and daughter.  able to assist minimally, daughter works. Pt has no steps to enter just a threshold into the home, needs are met on the first floor. Pt has a shower with doors and no grab bars. Pt independent PTA. Recently has been using a cane due to balance deficits, admits to 3-4 falls within the last 6 months. Pt is R handed and does not drive.

## 2024-07-08 NOTE — OCCUPATIONAL THERAPY INITIAL EVALUATION ADULT - LEVEL OF INDEPENDENCE: DRESS LOWER BODY, OT EVAL
maximum assist (25% patients effort)
to don socks seated in chair/minimum assist (75% patients effort)

## 2024-07-08 NOTE — OCCUPATIONAL THERAPY INITIAL EVALUATION ADULT - PLANNED THERAPY INTERVENTIONS, OT EVAL
ADL retraining/IADL retraining/balance training/bed mobility training/cognitive, visual perceptual/fine motor coordination training/motor coordination training/neuromuscular re-education/ROM/strengthening/transfer training
ADL retraining/balance training/bed mobility training/motor coordination training/neuromuscular re-education/ROM/strengthening/transfer training

## 2024-07-08 NOTE — DISCHARGE NOTE PROVIDER - NSDCCPCAREPLAN_GEN_ALL_CORE_FT
PRINCIPAL DISCHARGE DIAGNOSIS  Diagnosis: Non-traumatic intracranial hemorrhage  Assessment and Plan of Treatment: Continue with Keppra. No driving allowed at this time. Follow up with neurology outpatient.      SECONDARY DISCHARGE DIAGNOSES  Diagnosis: Elevated troponin  Assessment and Plan of Treatment: Continue taking ASA. Follow up with outpatient cardiology    Diagnosis: HTN (hypertension)  Assessment and Plan of Treatment: Continue with hypertension medications. Follow up with outpatient cardiology.    Diagnosis: CAD (coronary artery disease)  Assessment and Plan of Treatment: Continue with ASA. Follow up with outpatient cardiology    Diagnosis: Acute hypoxemic respiratory failure  Assessment and Plan of Treatment:     Diagnosis: Acute heart failure with preserved ejection fraction (HFpEF)  Assessment and Plan of Treatment: Monitor fluid intake and daily weights. Follow up with outpatient cardiology    Diagnosis: Stridor  Assessment and Plan of Treatment: Follow up outpatient with ENT.    Diagnosis: Acute respiratory failure  Assessment and Plan of Treatment: Follow up outpatient with ENT.    Diagnosis: Acute bronchospasm  Assessment and Plan of Treatment: Follow up outpatient with ENT.    Diagnosis: Hypertension  Assessment and Plan of Treatment: Follow up with outpatient cardiology.     PRINCIPAL DISCHARGE DIAGNOSIS  Diagnosis: Non-traumatic intracranial hemorrhage  Assessment and Plan of Treatment: Stable  Outpatient neurology follow up on discharge      SECONDARY DISCHARGE DIAGNOSES  Diagnosis: Acute respiratory failure  Assessment and Plan of Treatment: Resolved    Diagnosis: CAD (coronary artery disease)  Assessment and Plan of Treatment: Continue with ASA. Follow up with outpatient cardiology    Diagnosis: HTN (hypertension)  Assessment and Plan of Treatment: Follow a low sodium diet. Continue taking your blood pressure medications as prescribed. Follow up with your Primary Care Doctor to continue having your blood pressure checked on a continual basis.Continue with hypertension medications. Follow up with outpatient cardiology.    Diagnosis: Seizures  Assessment and Plan of Treatment: Due to intracranial hemorrage  Continue keppra as prescribed, no driving until outpatient neurology follow up      Diagnosis: Glenoid fracture of shoulder  Assessment and Plan of Treatment: No acute orthopedic surgical intervention  WBAT LUE with sling for comfort  Follow up with orthopedics outpatient in 1-2 weeks    Diagnosis: Stridor  Assessment and Plan of Treatment: Resolved

## 2024-07-08 NOTE — CONSULT NOTE ADULT - SUBJECTIVE AND OBJECTIVE BOX
Pt Name: BIANCA IZQUIERDO    MRN: [id number]      Patient is a 79y Female presenting to the emergency department and admitted with seizures is also complaining of L shoulder pain. The patient denies any acute injuries. The patient has pain with movement but is able to bear weight onto her L arm to use a walker today. Denies radiation of pain, paresthesias.   .    HEALTH ISSUES - PROBLEM Dx:  CAD (coronary artery disease)    Elevated troponin    HTN (hypertension)    Acute bronchospasm    Acute hypoxemic respiratory failure    Stridor    Acute heart failure with preserved ejection fraction (HFpEF)    Hypertension        .      REVIEW OF SYSTEMS      General: Alert, responsive, in NAD    Skin/Breast: No rashes, no pruritis   	  Ophthalmologic: No visual changes. No redness.   	  ENMT:	No discharge. No swelling.    Respiratory and Thorax: No difficulty breathing. No cough.  	   Cardiovascular:	No chest pain. No palpitations.    Gastrointestinal:	 No abdominal pain. No diarrhea.     Genitourinary: No dysuria. No bleeding.    Musculoskeletal: SEE HPI.    Neurological: No sensory or motor changes.     Psychiatric: No anxiety or depression.    Hematology/Lymphatics: No swelling.    Endocrine: No Hx of diabetes.    ROS is otherwise negative.    PAST MEDICAL & SURGICAL HISTORY:  PAST MEDICAL & SURGICAL HISTORY:      Allergies: penicillins (Unknown)      Medications: acetaminophen     Tablet .. 650 milliGRAM(s) Oral every 6 hours PRN  albuterol/ipratropium for Nebulization 3 milliLiter(s) Nebulizer every 6 hours  amLODIPine   Tablet 10 milliGRAM(s) Oral daily  aspirin  chewable 81 milliGRAM(s) Oral daily  atorvastatin 40 milliGRAM(s) Oral at bedtime  benzocaine/menthol Lozenge 1 Lozenge Oral four times a day PRN  buDESOnide    Inhalation Suspension 0.5 milliGRAM(s) Inhalation every 12 hours  cyclobenzaprine 5 milliGRAM(s) Oral three times a day PRN  dextrose 10% Bolus 125 milliLiter(s) IV Bolus once  dextrose 5%. 1000 milliLiter(s) IV Continuous <Continuous>  dextrose 5%. 1000 milliLiter(s) IV Continuous <Continuous>  dextrose 50% Injectable 12.5 Gram(s) IV Push once  dextrose 50% Injectable 25 Gram(s) IV Push once  dextrose Oral Gel 15 Gram(s) Oral once PRN  enoxaparin Injectable 40 milliGRAM(s) SubCutaneous every 24 hours  furosemide    Tablet 40 milliGRAM(s) Oral daily  glucagon  Injectable 1 milliGRAM(s) IntraMuscular once  hydrALAZINE Injectable 10 milliGRAM(s) IV Push every 2 hours PRN  insulin lispro (ADMELOG) corrective regimen sliding scale   SubCutaneous at bedtime  insulin lispro (ADMELOG) corrective regimen sliding scale   SubCutaneous three times a day before meals  labetalol Injectable 10 milliGRAM(s) IV Push every 2 hours PRN  levalbuterol Inhalation 0.63 milliGRAM(s) Inhalation every 6 hours PRN  levETIRAcetam   Injectable 1000 milliGRAM(s) IV Push every 12 hours  lidocaine   4% Patch 1 Patch Transdermal every 24 hours  metoprolol tartrate 75 milliGRAM(s) Oral two times a day  montelukast 10 milliGRAM(s) Oral daily  polyethylene glycol 3350 17 Gram(s) Oral daily  predniSONE   Tablet 40 milliGRAM(s) Oral daily  sacubitril 97 mG/valsartan 103 mG 1 Tablet(s) Oral two times a day  senna Syrup 10 milliLiter(s) Oral at bedtime  sertraline 100 milliGRAM(s) Oral daily  spironolactone 25 milliGRAM(s) Oral daily      FAMILY HISTORY:  : non-contributory    Social History:     Ambulation: Walking With Walker                          14.1   11.76 )-----------( 338      ( 08 Jul 2024 04:59 )             42.4     07-08    136  |  98  |  20.9<H>  ----------------------------<  106<H>  3.9   |  26.0  |  0.78    Ca    8.6      08 Jul 2024 04:59  Mg     1.8     07-08        PHYSICAL EXAM:    Vital Signs Last 24 Hrs  T(C): 36.4 (08 Jul 2024 16:19), Max: 37.1 (08 Jul 2024 00:02)  T(F): 97.5 (08 Jul 2024 16:19), Max: 98.8 (08 Jul 2024 00:02)  HR: 79 (08 Jul 2024 16:00) (58 - 82)  BP: 135/81 (08 Jul 2024 16:00) (119/94 - 150/75)  BP(mean): 94 (08 Jul 2024 16:00) (94 - 103)  RR: 19 (08 Jul 2024 16:00) (16 - 20)  SpO2: 97% (08 Jul 2024 16:00) (94% - 99%)    Parameters below as of 08 Jul 2024 16:00  Patient On (Oxygen Delivery Method): room air      Daily     Daily     Appearance: Alert, responsive, in no acute distress.    Skin: no rash on visible skin. Skin is clean, dry and intact. No bleeding. No abrasions. No ulcerations.    Musculoskeletal:         Left Upper Extremity: Tenderness to palpation over anterior shoulder. Painful ROM of L shoulder with flexion and abduction. +ROM of wrist/fingers. Sensation is grossly intact to light touch. 2+ distal pulses. Cap refill < 2 sec.     Imaging Studies:< from: Xray Shoulder 2 Views, Bilateral (07.01.24 @ 12:52) >  ACC: [id number] EXAM:  XR SHOULDER COMP MIN 2V BI   ORDERED BY: ENMA PHILLIPS     PROCEDURE DATE:  07/01/2024          INTERPRETATION:  Radiographs of the RIGHT and LEFT shoulders    CLINICAL INFORMATION: Injury with Pain.    TECHNIQUE: Bilateral Frontal views in internal and external rotation.   Shoulder Y-view.. 6 views total    FINDINGS:   No prior examinations are available for review.    No fracture, dislocation or destructive bone lesion.  The humeral heads are located within glenoid fossa.  The acromioclavicular joints are aligned and intact.  No pathologic calcifications or soft tissue abnormalities.  The visualized lung upper zone and ribs are within normal limits..    IMPRESSION:    No radiographic osseous pathology.  No radiographic evidence of degenerative or inflammatory arthritis.    --- End of Report ---            HUMPHREY MCINTOSH MD; Attending Radiologist  This document has been electronically signed. Jul 1 2024  6:50PM    < end of copied text >      < from: CT Shoulder No Cont, Left (07.08.24 @ 12:40) >  ACC: [id number] EXAM:  CT SHOULDER ONLY LT   ORDERED BY: YASEMIN DE JESUS     PROCEDURE DATE:  07/08/2024          INTERPRETATION:  CT SHOULDER LEFT    HISTORY: Left shoulder pain.    TECHNIQUE: Contiguous axial imaging was performed through the left   shoulder without contrast. Coronal and sagittal reformatting was utilized.    COMPARISON:  Bilateral shoulder x-rays dated 7/1/2024.    FINDINGS:    OSSEOUS STRUCTURES    Fractures:  Mildly displaced and impacted comminuted fracture involves   the anterior-inferior glenoid and approximately 30% of the articular   surface based on a best fit [place] method. Small Hill-Sachs lesion is   also present versus enthesopathic remodeling of the humeral head. There   are faint adjacent calcification.    ROTATOR CUFF TENDONS    Rotator Cuff Tendons:  Limited, no definite tearing. 8 calcifications   extend along the undersurface of the supraspinatus and infraspinatus.   This may reflect calcific debris, chondrocalcinosis, and or calcific   tendinopathy.    Muscle Atrophy:  None.    CORACOACROMIAL ARCH & AC JOINT    Acromiohumeral Space:  Widely patent.    Acromioclavicular Joint:  Mild arthrosis is noted.    GLENOHUMERAL JOINT    Joint Space:  Otherwise preserved.    Intra-articular Bodies:  Calcific debris and/or chondrocalcinosis is   noted.    Effusion:  Small effusion is present.    SOFT TISSUES  Mild atherosclerotic ossifications are present and there are greater   coronary calcifications.    IMAGED LUNGS  Faint groundglass atelectatic changes are noted.    IMPRESSION:  1.  Mildly displaced and impacted comminuted fracture involves the   anteroinferior glenoid.  2.  Small Hill-Sachs lesion is also present versus enthesopathic   remodeling of the humeral head.    --- End of Report ---            NBA WOOD MD; Attending Radiologist  This document has been electronically signed. Jul 8 2024 12:52PM    < end of copied text >      A/P:  Pt is a 79y Female with acute vs subacute L glenoid fx    Case discussed with Dr. Foster    PLAN:   * No acute orthopedic surgical intervention  * WBAT LUE with sling for comfort  * f/u with orthopedics outpatient in 1-2 weeks

## 2024-07-08 NOTE — CONSULT NOTE ADULT - CONSULT REASON
IPH
elevated troponin
seizure and intracranial hemorrhage
Upper airway evaluation
Worsening Wheezing and stridor
L glendoif fx
ICH
wheezing

## 2024-07-08 NOTE — OCCUPATIONAL THERAPY INITIAL EVALUATION ADULT - GENERAL OBSERVATIONS, REHAB EVAL
+IV, +cardiac monitor
Pt is Daniela speaking, able to understand and speak in English at times. Pt daughter bedside able and willing to assist with translation. Received pt semifowler in bed, NAD, +IV, +Tele//BP, +Angel, +b/l VCB on RA A&Ox 2-3 with daughter bedside. Pt c/o 10/10 pain with mvmt in LUE shoulder; RN aware. Pt agreeable to OT evaluation

## 2024-07-08 NOTE — OCCUPATIONAL THERAPY INITIAL EVALUATION ADULT - DIAGNOSIS, OT EVAL
Right parietal lobe hematoma
79 year old Female presented to [facility] with AMS and urinary incontinence, LKW approximately 530am. CTH at [place] revealed R parietal lobe hematoma. Pt was transferred to [facility] for further management. Pt with acute encephalopathy, suspected seizures. Pt with Acute ?spont R parietal ICH, CTA unremarkable. Pt with Acute resp failure due to neurologic condition. Extubated 6/30.

## 2024-07-09 DIAGNOSIS — J98.01 ACUTE BRONCHOSPASM: ICD-10-CM

## 2024-07-09 DIAGNOSIS — I10 ESSENTIAL (PRIMARY) HYPERTENSION: ICD-10-CM

## 2024-07-09 LAB
GLUCOSE BLDC GLUCOMTR-MCNC: 114 MG/DL — HIGH (ref 70–99)
GLUCOSE BLDC GLUCOMTR-MCNC: 125 MG/DL — HIGH (ref 70–99)
GLUCOSE BLDC GLUCOMTR-MCNC: 138 MG/DL — HIGH (ref 70–99)
GLUCOSE BLDC GLUCOMTR-MCNC: 174 MG/DL — HIGH (ref 70–99)

## 2024-07-09 PROCEDURE — 99232 SBSQ HOSP IP/OBS MODERATE 35: CPT

## 2024-07-09 PROCEDURE — 99233 SBSQ HOSP IP/OBS HIGH 50: CPT

## 2024-07-09 RX ORDER — ONDANSETRON HYDROCHLORIDE 2 MG/ML
4 INJECTION INTRAMUSCULAR; INTRAVENOUS ONCE
Refills: 0 | Status: COMPLETED | OUTPATIENT
Start: 2024-07-09 | End: 2024-07-09

## 2024-07-09 RX ADMIN — ASPIRIN 81 MILLIGRAM(S): 325 TABLET, FILM COATED ORAL at 12:45

## 2024-07-09 RX ADMIN — SACUBITRIL AND VALSARTAN 1 TABLET(S): 97; 103 TABLET, FILM COATED ORAL at 17:36

## 2024-07-09 RX ADMIN — SACUBITRIL AND VALSARTAN 1 TABLET(S): 97; 103 TABLET, FILM COATED ORAL at 06:17

## 2024-07-09 RX ADMIN — AMLODIPINE BESYLATE 10 MILLIGRAM(S): 2.5 TABLET ORAL at 06:18

## 2024-07-09 RX ADMIN — ENOXAPARIN SODIUM 40 MILLIGRAM(S): 100 INJECTION SUBCUTANEOUS at 17:35

## 2024-07-09 RX ADMIN — SERTRALINE HYDROCHLORIDE 100 MILLIGRAM(S): 100 TABLET, FILM COATED ORAL at 12:45

## 2024-07-09 RX ADMIN — MONTELUKAST SODIUM 10 MILLIGRAM(S): 10 TABLET, FILM COATED ORAL at 12:45

## 2024-07-09 RX ADMIN — BUDESONIDE 0.5 MILLIGRAM(S): 0.25 INHALANT ORAL at 20:25

## 2024-07-09 RX ADMIN — IPRATROPIUM BROMIDE AND ALBUTEROL SULFATE 3 MILLILITER(S): .5; 3 SOLUTION RESPIRATORY (INHALATION) at 15:29

## 2024-07-09 RX ADMIN — ATORVASTATIN CALCIUM 40 MILLIGRAM(S): 20 TABLET, FILM COATED ORAL at 22:23

## 2024-07-09 RX ADMIN — BUDESONIDE 0.5 MILLIGRAM(S): 0.25 INHALANT ORAL at 09:01

## 2024-07-09 RX ADMIN — POLYETHYLENE GLYCOL 3350 17 GRAM(S): 1 POWDER ORAL at 12:46

## 2024-07-09 RX ADMIN — ONDANSETRON HYDROCHLORIDE 4 MILLIGRAM(S): 2 INJECTION INTRAMUSCULAR; INTRAVENOUS at 20:57

## 2024-07-09 RX ADMIN — LEVETIRACETAM 1000 MILLIGRAM(S): 100 INJECTION INTRAVENOUS at 06:17

## 2024-07-09 RX ADMIN — LEVETIRACETAM 1000 MILLIGRAM(S): 100 INJECTION INTRAVENOUS at 17:36

## 2024-07-09 RX ADMIN — IPRATROPIUM BROMIDE AND ALBUTEROL SULFATE 3 MILLILITER(S): .5; 3 SOLUTION RESPIRATORY (INHALATION) at 20:25

## 2024-07-09 RX ADMIN — Medication 75 MILLIGRAM(S): at 06:17

## 2024-07-09 RX ADMIN — PREDNISONE 40 MILLIGRAM(S): 10 TABLET ORAL at 06:18

## 2024-07-09 RX ADMIN — Medication 75 MILLIGRAM(S): at 17:35

## 2024-07-09 RX ADMIN — IPRATROPIUM BROMIDE AND ALBUTEROL SULFATE 3 MILLILITER(S): .5; 3 SOLUTION RESPIRATORY (INHALATION) at 09:02

## 2024-07-09 RX ADMIN — INSULIN LISPRO 1: 100 INJECTION, SOLUTION SUBCUTANEOUS at 12:44

## 2024-07-09 RX ADMIN — SPIRONOLACTONE 25 MILLIGRAM(S): 25 TABLET ORAL at 06:18

## 2024-07-09 RX ADMIN — FUROSEMIDE 40 MILLIGRAM(S): 10 INJECTION, SOLUTION INTRAMUSCULAR; INTRAVENOUS at 06:18

## 2024-07-09 RX ADMIN — LIDOCAINE HCL 1 PATCH: 28 GEL TOPICAL at 22:23

## 2024-07-09 NOTE — PROGRESS NOTE ADULT - ASSESSMENT
79F with MHx significant for HTN, HLD and CAD s/p stent who takes ASA presented to [facility] this morning with AMS and urinary incontinence and her last known well approximately 530am. CTH at [place] revealed R parietal lobe hematoma    #R Parietal lobe hematoma surrounding vasogenic edema  #New onset seizures  Transferred from hospitals  MRI Head w/wo reviewed  ASA resumed 7/6  Atorvastatin when able to tolerate PO  KEPPRA 1000mg BID - no driving until outpatient neurology followup   Seizure precautions  CT neck w/o significant stenosis    #Left shoulder pain - reports pain after moving off a stretcher, active ROM limited   xray without acute findings  CT shoulder with Mildly displaced and impacted comminuted fracture involves the anteroinferior glenoid   Ortho note janice- WBAT LUE with sling for comfort.   lidocaine patch, add PRN flexiril     #Stridor - resolved   S/p intubation for 22hrs, now on RA  S/p laryngoscopy w concern for subglottic stenosis  Heliox therapy, racemic epi  Steroids- solumedrol 40mg IV q6, change to PO Prednisone x 5 days  ENT eval noted, outpt f/u  CT chest noted with mass effect -- d/w radiation, that is a anatomic variant, no clinical significance     #HTN, HLD and CAD s/p stent  #HFpEF  Entresto, aldactone, lopressor, Amlodipine   Statin  Appreciate cards recs - will c/w PO lasix QD  ASA    #Troponemia  Likely demand from above, trended down and remained flat  No chest pain or ischemic ekg changes    #Psych  Zoloft    #GERD  Pronotix    DVT ppx: Lovenox    S/S eval- Regular diet  PT- Medically stable for Acute rehab. Auth started.    D/w Daughter and Cm/SW

## 2024-07-09 NOTE — PROGRESS NOTE ADULT - SUBJECTIVE AND OBJECTIVE BOX
Patient feels well.  Daughter at bedside.  Reports no overnight events.  Patient utilizing her left hand more functionally.  Spoke to ALICIA Mena about wanting [facility] and will call to ascertain if feasible.  Discussed with daughter plan and otherwise, plan is [facility] if above does not work out.     FUNCTIONAL PROGRESS  7/8 OT  Bathing Training:     · Level of Young	minimum assist (75% patients effort); seated  · Physical Assist/Nonphysical Assist	1 person assist    Upper Body Dressing Training:     · Level of Young	minimum assist (75% patients effort); to don gown  · Physical Assist/Nonphysical Assist	1 person assist    Lower Body Dressing Training:     · Level of Young	minimum assist (75% patients effort); to don socks seated in chair  · Physical Assist/Nonphysical Assist	1 person assist    Toilet Hygiene Training:     · Level of Young	minimum assist (75% patients effort); for dillon hygiene  · Physical Assist/Nonphysical Assist	1 person assist    Grooming Training:     · Level of Young	contact guard; standing at sink to wash hands  · Physical Assist/Nonphysical Assist	1 person assist    7/8 PT  Bed Mobility  Bed Mobility Training Sit-to-Supine: minimum assist (75% patient effort);  bed rails  Bed Mobility Training Supine-to-Sit: minimum assist (75% patient effort);  bed rails  Bed Mobility Training Limitations: decreased ability to use arms for pushing/pulling;  impaired ability to control trunk for mobility;  decreased ability to use legs for bridging/pushing;  pain;  decreased strength;  impaired balance    Sit-Stand Transfer Training  Transfer Training Sit-to-Stand Transfer: contact guard  Transfer Training Stand-to-Sit Transfer: contact guard  Sit-to-Stand Transfer Training Transfer Safety Analysis: decreased balance;  decreased strength;  rolling walker    Gait Training  Gait Training: minimum assist (75% patient effort);  1 person assist;  weight-bearing as tolerated   rolling walker;  40ft x 2 with standing rest. hands on cues for direction of gait path   Gait Analysis: 3-point gait   increased time in double stance;  decreased sensation;  impaired balance        VITALS  T(C): 37.6 (07-09-24 @ 08:00), Max: 37.6 (07-09-24 @ 08:00)  HR: 66 (07-09-24 @ 09:04) (60 - 82)  BP: 143/94 (07-09-24 @ 08:00) (127/74 - 166/80)  RR: 19 (07-09-24 @ 08:00) (18 - 20)  SpO2: 92% (07-09-24 @ 09:04) (92% - 98%)  Wt(kg): --    MEDICATIONS   acetaminophen     Tablet .. 650 milliGRAM(s) every 6 hours PRN  albuterol/ipratropium for Nebulization 3 milliLiter(s) every 6 hours  amLODIPine   Tablet 10 milliGRAM(s) daily  aspirin  chewable 81 milliGRAM(s) daily  atorvastatin 40 milliGRAM(s) at bedtime  benzocaine/menthol Lozenge 1 Lozenge four times a day PRN  buDESOnide    Inhalation Suspension 0.5 milliGRAM(s) every 12 hours  cyclobenzaprine 5 milliGRAM(s) three times a day PRN  dextrose 10% Bolus 125 milliLiter(s) once  dextrose 5%. 1000 milliLiter(s) <Continuous>  dextrose 5%. 1000 milliLiter(s) <Continuous>  dextrose 50% Injectable 25 Gram(s) once  dextrose 50% Injectable 12.5 Gram(s) once  dextrose Oral Gel 15 Gram(s) once PRN  enoxaparin Injectable 40 milliGRAM(s) every 24 hours  furosemide    Tablet 40 milliGRAM(s) daily  glucagon  Injectable 1 milliGRAM(s) once  hydrALAZINE Injectable 10 milliGRAM(s) every 2 hours PRN  insulin lispro (ADMELOG) corrective regimen sliding scale   at bedtime  insulin lispro (ADMELOG) corrective regimen sliding scale   three times a day before meals  labetalol Injectable 10 milliGRAM(s) every 2 hours PRN  levalbuterol Inhalation 0.63 milliGRAM(s) every 6 hours PRN  levETIRAcetam   Injectable 1000 milliGRAM(s) every 12 hours  lidocaine   4% Patch 1 Patch every 24 hours  metoprolol tartrate 75 milliGRAM(s) two times a day  montelukast 10 milliGRAM(s) daily  polyethylene glycol 3350 17 Gram(s) daily  predniSONE   Tablet 40 milliGRAM(s) daily  sacubitril 97 mG/valsartan 103 mG 1 Tablet(s) two times a day  senna Syrup 10 milliLiter(s) at bedtime  sertraline 100 milliGRAM(s) daily  spironolactone 25 milliGRAM(s) daily      RECENT LABS/IMAGING  - Reviewed Today                        14.1   11.76 )-----------( 338      ( 08 Jul 2024 04:59 )             42.4     07-08    136  |  98  |  20.9<H>  ----------------------------<  106<H>  3.9   |  26.0  |  0.78    Ca    8.6      08 Jul 2024 04:59  Mg     1.8     07-08        Urinalysis Basic - ( 08 Jul 2024 04:59 )    Color: x / Appearance: x / SG: x / pH: x  Gluc: 106 mg/dL / Ketone: x  / Bili: x / Urobili: x   Blood: x / Protein: x / Nitrite: x   Leuk Esterase: x / RBC: x / WBC x   Sq Epi: x / Non Sq Epi: x / Bacteria: x              HEAD CT, CTA HEAD & NECK 6/29 - Age-appropriate involutional and ischemic gliotic changes. No change in right parietal parenchymal hemorrhage since 7:42 AM. Normal CTA of the head and neck.    HEAD CT 6/30 - No change since 6/29/2024. Right small right parietal cortical parenchymal hemorrhage.    CXR 7/2 - Clear lungs on the latest film.    MRI HEAD 7/4 - Small parenchymal hemorrhage peripherally within the right parietal lobe with mild surrounding edema. No definitive enhancement, underlying lesion, or associated abnormal flow voids. Please note the presence of increased T1 signal precontrast somewhat limits evaluation for enhancement.Multifocal areas of hemosiderin compatible with prior microhemorrhages. Multiple chronic lacunar infarcts. Moderate chronic white matter ischemic changes. Findings likely related to long-standing hypertension.9 mm enhancing nodule superficial lobe right parotid gland    CT LEFT  SHOULDER 7/8 - 1.  Mildly displaced and impacted comminuted fracture involves the anteroinferior glenoid.2.  Small Hill-Sachs lesion is also present versus enthesopathic remodeling of the humeral head.  ----------------------------------------------------------------------------------------  PHYSICAL EXAM  Constitutional - NAD, Comfortable   Extremities - No calf tenderness   Neurologic Exam -                    Cognitive - AAO to self, place, date, year, situation     Communication - Fluent, Mild dysarthria     Cranial Nerves - CN 2-12 intact     FUNCTIONAL MOTOR EXAM - Mild deficits of the left UE      Sensory - Intact to LT   Psychiatric - Fatigued  ----------------------------------------------------------------------------------------  ASSESSMENT/PLAN  79yFemale with functional deficits after developing an IPH  Right parietal IPH - Stable, Convert Keppra IV to PO  Acute CHF/Uncontrolled HTN - Lopressor, Entresto, Aldactone, Norvasc, Lasix, Recommend taper Hydralazine IV & Labetalol IV  CAD - Lipitor, ASA  Pulm - Duoneb, Pulmicort, Lasix, Xopenex  Mood - Zoloft  Left Glenoid Fracture - WBAT, Sling ONLY when standing/walking  Pain - Tylenol  DVT PPX - SCDs, Lovenox  Rehab/Impaired mobility and function - Patient continues to require hospitalization for the above diagnoses and ongoing active management of comorbid complications that are substantially posing a threat to bodily function, functional ability and quality of life.     RECOMMEND - OOB daily     When medically optimized, based on the patient's diagnosis, current functional status and potential for progress, recommend ACUTE inpatient rehabilitation for the functional deficits consisting of 3 hours of therapy/day & 24 hour RN/daily PMR physician for active comorbid medical management. Patient will be able to tolerate 3 hours a day. Spoke to CM about planning recommendations.      Will continue to follow. Rehab recommendations are dependent on how functional progress changes as well as how patient continues to participate and tolerate therapeutic interventions, WHICH MAY CHANGE UPON FOLLOW UP EXAMINATIONS. Recommend ongoing mobilization by staff to maintain cardiopulmonary function and prevention of secondary complications related to debility/immobility. Discussed the specific management and recommendations above with rehab clinical care team/rehab liaison.      Total Time Spent on Encounter (reviewing clinical notes, labs, radiology and medications, reviewing patient history, pre-rounding, physical exam, assessment and discussing rehabilitation options - with consideration of prior level of function, expected level of recovery and return to community living, rounding with team) - 50 minutes

## 2024-07-09 NOTE — CHART NOTE - NSCHARTNOTEFT_GEN_A_CORE
Source: Patient [ ]  Family [ ]   other [x] EMR    Current Diet: Diet, Regular:   Lacto Veg (Accepts Milk & Milk Products) (07-03-24 @ 13:55)    PO intake:  < 50% [ ]   50-75%  [ ]   %  [ ]  other: fluctuating po    Source for PO intake [ ] Patient [ ] family [x] chart [ ] staff [ ] other    Current Weight:   7/5 76.6 kg  6/30 85.2 kg  ?accuracy of weights, no edema documented     Pertinent Medications: MEDICATIONS  (STANDING):  furosemide    Tablet 40 milliGRAM(s) Oral daily  insulin lispro (ADMELOG) corrective regimen sliding scale   SubCutaneous at bedtime  insulin lispro (ADMELOG) corrective regimen sliding scale   SubCutaneous three times a day before meals  polyethylene glycol 3350 17 Gram(s) Oral daily  predniSONE   Tablet 40 milliGRAM(s) Oral daily  senna Syrup 10 milliLiter(s) Oral at bedtime  spironolactone 25 milliGRAM(s) Oral daily    Pertinent Labs: No new labs today, last labs 7/8. Hypokalemia previously, WNL on 7/8.    Skin: B/L bottom of feet wounds    Estimated Needs:   [x] no change since previous assessment  [id number] kcals/day (based on 25-30 kcal/kg 77 kg)   g pro/day (based on 1.2-1.4 g/kg 77 kg)    Clinical Course: 79F with MHx significant for HTN, HLD and CAD s/p stent who takes ASA presented to [facility] this morning with AMS and urinary incontinence and new onset seizures. CTH at [facility] revealed R parietal lobe hematoma, transferred to [facility]. Was intubated, now extubated, downgraded to medicine.    Current Nutrition Diagnosis: Increased Nutrient Needs (protein-energy) related to increased physiologic demand for nutrient as evidenced by admission with R parietal ICH.   Chart reviewed. Per last RD note, pt with good po intake prior to admission, documented to be fluctuating the past few days. If po intake continues to decline, consider plant based oral nutrition supplement. HIE weight hx: 11/28/21 167 lbs, 6/29/24 166 lbs. This admission weight on 6/30 187 lbs likely inaccurate, 7/5 weight 169 lbs; continue to monitor trends. Last BM today, bowel regimen in place. Pt noted to be medically stable for discharge to rehab pending auth. Recommendations below:    Recommendations:  1. Continue current diet as tolerated (Lacto-Ovo Vegetarian: accepts dairy products and eggs)  2. Encourage adequate po intake as needed/tolerated   3. Monitor PO intake / need for oral nutrition supplementation  4. Continue to monitor weight trends    Monitoring and Evaluation:   [x] PO intake [x] Tolerance to diet prescription [X] Weights  [X] Follow up per protocol [X] Labs: CBC, CMP

## 2024-07-09 NOTE — PROGRESS NOTE ADULT - SUBJECTIVE AND OBJECTIVE BOX
Patient is a 79y old  Female who presents with a chief complaint of IPH ?seizure (08 Jul 2024 17:47)      SUBJECTIVE / OVERNIGHT EVENTS: Seen and examined. Feels ok. still has left shoulder pain. Daughter at bedside. Denies chest pain, SOB, abd pain, N/V, fever, chills, dysuria or any other complaints. All remainder ROS negative.     MEDICATIONS  (STANDING):  albuterol/ipratropium for Nebulization 3 milliLiter(s) Nebulizer every 6 hours  amLODIPine   Tablet 10 milliGRAM(s) Oral daily  aspirin  chewable 81 milliGRAM(s) Oral daily  atorvastatin 40 milliGRAM(s) Oral at bedtime  buDESOnide    Inhalation Suspension 0.5 milliGRAM(s) Inhalation every 12 hours  dextrose 10% Bolus 125 milliLiter(s) IV Bolus once  dextrose 5%. 1000 milliLiter(s) (50 mL/Hr) IV Continuous <Continuous>  dextrose 5%. 1000 milliLiter(s) (100 mL/Hr) IV Continuous <Continuous>  dextrose 50% Injectable 25 Gram(s) IV Push once  dextrose 50% Injectable 12.5 Gram(s) IV Push once  enoxaparin Injectable 40 milliGRAM(s) SubCutaneous every 24 hours  furosemide    Tablet 40 milliGRAM(s) Oral daily  glucagon  Injectable 1 milliGRAM(s) IntraMuscular once  insulin lispro (ADMELOG) corrective regimen sliding scale   SubCutaneous at bedtime  insulin lispro (ADMELOG) corrective regimen sliding scale   SubCutaneous three times a day before meals  levETIRAcetam   Injectable 1000 milliGRAM(s) IV Push every 12 hours  lidocaine   4% Patch 1 Patch Transdermal every 24 hours  metoprolol tartrate 75 milliGRAM(s) Oral two times a day  montelukast 10 milliGRAM(s) Oral daily  polyethylene glycol 3350 17 Gram(s) Oral daily  predniSONE   Tablet 40 milliGRAM(s) Oral daily  sacubitril 97 mG/valsartan 103 mG 1 Tablet(s) Oral two times a day  senna Syrup 10 milliLiter(s) Oral at bedtime  sertraline 100 milliGRAM(s) Oral daily  spironolactone 25 milliGRAM(s) Oral daily    MEDICATIONS  (PRN):  acetaminophen     Tablet .. 650 milliGRAM(s) Oral every 6 hours PRN Temp greater or equal to 38C (100.4F), Mild Pain (1 - 3)  benzocaine/menthol Lozenge 1 Lozenge Oral four times a day PRN Sore Throat  cyclobenzaprine 5 milliGRAM(s) Oral three times a day PRN Muscle Spasm  dextrose Oral Gel 15 Gram(s) Oral once PRN Blood Glucose LESS THAN 70 milliGRAM(s)/deciliter  hydrALAZINE Injectable 10 milliGRAM(s) IV Push every 2 hours PRN Sbp >160  labetalol Injectable 10 milliGRAM(s) IV Push every 2 hours PRN SBP>160  levalbuterol Inhalation 0.63 milliGRAM(s) Inhalation every 6 hours PRN SOB/wheezing        I&O's Summary    08 Jul 2024 07:01  -  09 Jul 2024 07:00  --------------------------------------------------------  IN: 550 mL / OUT: 650 mL / NET: -100 mL        PHYSICAL EXAM:  Vital Signs Last 24 Hrs  T(C): 37.6 (09 Jul 2024 08:00), Max: 37.6 (09 Jul 2024 08:00)  T(F): 99.6 (09 Jul 2024 08:00), Max: 99.6 (09 Jul 2024 08:00)  HR: 66 (09 Jul 2024 09:04) (60 - 82)  BP: 143/94 (09 Jul 2024 08:00) (127/74 - 166/80)  BP(mean): 110 (09 Jul 2024 08:00) (94 - 110)  RR: 19 (09 Jul 2024 08:00) (18 - 20)  SpO2: 92% (09 Jul 2024 09:04) (92% - 98%)    Parameters below as of 09 Jul 2024 09:04  Patient On (Oxygen Delivery Method): room air        CONSTITUTIONAL: NAD, pleasant  RESPIRATORY: Normal respiratory effort; lungs are clear to auscultation bilaterally  CARDIOVASCULAR: Regular rate and rhythm, no LE edema  ABDOMEN: Nontender to palpation, normoactive bowel sounds  MSK: TTP of the left shoulder with movement, especially when lifting above horizontal   PSYCH: A+O x3; affect appropriate  NEUROLOGY: CN 2-12 are intact and symmetric; no gross sensory deficits    LABS:                        14.1   11.76 )-----------( 338      ( 08 Jul 2024 04:59 )             42.4     07-08    136  |  98  |  20.9<H>  ----------------------------<  106<H>  3.9   |  26.0  |  0.78    Ca    8.6      08 Jul 2024 04:59  Mg     1.8     07-08            Urinalysis Basic - ( 08 Jul 2024 04:59 )    Color: x / Appearance: x / SG: x / pH: x  Gluc: 106 mg/dL / Ketone: x  / Bili: x / Urobili: x   Blood: x / Protein: x / Nitrite: x   Leuk Esterase: x / RBC: x / WBC x   Sq Epi: x / Non Sq Epi: x / Bacteria: x        CAPILLARY BLOOD GLUCOSE      POCT Blood Glucose.: 114 mg/dL (09 Jul 2024 08:18)  POCT Blood Glucose.: 101 mg/dL (08 Jul 2024 23:07)  POCT Blood Glucose.: 117 mg/dL (08 Jul 2024 16:34)  POCT Blood Glucose.: 178 mg/dL (08 Jul 2024 13:08)        RADIOLOGY & ADDITIONAL TESTS:  Results Reviewed:   Imaging Personally Reviewed:  Electrocardiogram Personally Reviewed:

## 2024-07-10 LAB
GLUCOSE BLDC GLUCOMTR-MCNC: 106 MG/DL — HIGH (ref 70–99)
GLUCOSE BLDC GLUCOMTR-MCNC: 135 MG/DL — HIGH (ref 70–99)
GLUCOSE BLDC GLUCOMTR-MCNC: 164 MG/DL — HIGH (ref 70–99)
GLUCOSE BLDC GLUCOMTR-MCNC: 95 MG/DL — SIGNIFICANT CHANGE UP (ref 70–99)

## 2024-07-10 PROCEDURE — 99233 SBSQ HOSP IP/OBS HIGH 50: CPT

## 2024-07-10 PROCEDURE — 99232 SBSQ HOSP IP/OBS MODERATE 35: CPT

## 2024-07-10 RX ADMIN — AMLODIPINE BESYLATE 10 MILLIGRAM(S): 2.5 TABLET ORAL at 06:06

## 2024-07-10 RX ADMIN — LIDOCAINE HCL 1 PATCH: 28 GEL TOPICAL at 21:34

## 2024-07-10 RX ADMIN — ENOXAPARIN SODIUM 40 MILLIGRAM(S): 100 INJECTION SUBCUTANEOUS at 18:26

## 2024-07-10 RX ADMIN — POLYETHYLENE GLYCOL 3350 17 GRAM(S): 1 POWDER ORAL at 12:55

## 2024-07-10 RX ADMIN — LEVETIRACETAM 1000 MILLIGRAM(S): 100 INJECTION INTRAVENOUS at 06:06

## 2024-07-10 RX ADMIN — ATORVASTATIN CALCIUM 40 MILLIGRAM(S): 20 TABLET, FILM COATED ORAL at 21:34

## 2024-07-10 RX ADMIN — SERTRALINE HYDROCHLORIDE 100 MILLIGRAM(S): 100 TABLET, FILM COATED ORAL at 12:55

## 2024-07-10 RX ADMIN — LIDOCAINE HCL 1 PATCH: 28 GEL TOPICAL at 11:00

## 2024-07-10 RX ADMIN — ASPIRIN 81 MILLIGRAM(S): 325 TABLET, FILM COATED ORAL at 12:55

## 2024-07-10 RX ADMIN — Medication 75 MILLIGRAM(S): at 06:06

## 2024-07-10 RX ADMIN — Medication 75 MILLIGRAM(S): at 18:26

## 2024-07-10 RX ADMIN — SACUBITRIL AND VALSARTAN 1 TABLET(S): 97; 103 TABLET, FILM COATED ORAL at 06:06

## 2024-07-10 RX ADMIN — PREDNISONE 40 MILLIGRAM(S): 10 TABLET ORAL at 06:06

## 2024-07-10 RX ADMIN — INSULIN LISPRO 1: 100 INJECTION, SOLUTION SUBCUTANEOUS at 17:43

## 2024-07-10 RX ADMIN — LIDOCAINE HCL 1 PATCH: 28 GEL TOPICAL at 07:39

## 2024-07-10 RX ADMIN — FUROSEMIDE 40 MILLIGRAM(S): 10 INJECTION, SOLUTION INTRAMUSCULAR; INTRAVENOUS at 06:07

## 2024-07-10 RX ADMIN — SPIRONOLACTONE 25 MILLIGRAM(S): 25 TABLET ORAL at 06:06

## 2024-07-10 RX ADMIN — SACUBITRIL AND VALSARTAN 1 TABLET(S): 97; 103 TABLET, FILM COATED ORAL at 18:26

## 2024-07-10 RX ADMIN — LEVETIRACETAM 1000 MILLIGRAM(S): 100 INJECTION INTRAVENOUS at 18:26

## 2024-07-10 RX ADMIN — MONTELUKAST SODIUM 10 MILLIGRAM(S): 10 TABLET, FILM COATED ORAL at 12:55

## 2024-07-10 NOTE — PROGRESS NOTE ADULT - SUBJECTIVE AND OBJECTIVE BOX
Patient fatigued.  Daughter at bedside.  As per discussion with AQUILES Moran, has been accepted to [facility], she is pending auth.  Discussed further with daughter.     FUNCTIONAL PROGRESS  7/9 PT  Bed Mobility  Bed Mobility Training Supine-to-Sit: minimum assist (75% patient effort)  Bed Mobility Training Limitations: impaired ability to control trunk for mobility;  impaired balance;  decreased strength    Sit-Stand Transfer Training  Transfer Training Sit-to-Stand Transfer: minimum assist (75% patient effort);  from toilet;  rolling walker;  weight-bearing as tolerated  Transfer Training Stand-to-Sit Transfer: close supervision ;  rolling walker;  weight-bearing as tolerated  Sit-to-Stand Transfer Training Transfer Safety Analysis: decreased balance;  impaired balance;  rolling walker    Gait Training  Gait Training: contact guard;  1 person assist;  weight-bearing as tolerated   rolling walker;  40+10  Gait Analysis: impaired balance;  rolling walker    7/8 OT  Bathing Training:     · Level of [place]	minimum assist (75% patients effort); seated  · Physical Assist/Nonphysical Assist	1 person assist    Upper Body Dressing Training:     · Level of [place]	minimum assist (75% patients effort); to don gown  · Physical Assist/Nonphysical Assist	1 person assist    Lower Body Dressing Training:     · Level of [place]	minimum assist (75% patients effort); to don socks seated in chair  · Physical Assist/Nonphysical Assist	1 person assist    Toilet Hygiene Training:     · Level of [place]	minimum assist (75% patients effort); for dillon hygiene  · Physical Assist/Nonphysical Assist	1 person assist    Grooming Training:     · Level of [place]	contact guard; standing at sink to wash hands  · Physical Assist/Nonphysical Assist	1 person assist      VITALS  T(C): 36.6 (07-10-24 @ 05:02), Max: 36.9 (07-09-24 @ 16:59)  HR: 51 (07-10-24 @ 05:02) (51 - 80)  BP: 120/67 (07-10-24 @ 05:02) (107/65 - 138/70)  RR: 18 (07-10-24 @ 05:02) (18 - 19)  SpO2: 95% (07-10-24 @ 05:02) (94% - 100%)  Wt(kg): --    MEDICATIONS   acetaminophen     Tablet .. 650 milliGRAM(s) every 6 hours PRN  amLODIPine   Tablet 10 milliGRAM(s) daily  aspirin  chewable 81 milliGRAM(s) daily  atorvastatin 40 milliGRAM(s) at bedtime  benzocaine/menthol Lozenge 1 Lozenge four times a day PRN  cyclobenzaprine 5 milliGRAM(s) three times a day PRN  dextrose 10% Bolus 125 milliLiter(s) once  dextrose 5%. 1000 milliLiter(s) <Continuous>  dextrose 5%. 1000 milliLiter(s) <Continuous>  dextrose 50% Injectable 12.5 Gram(s) once  dextrose 50% Injectable 25 Gram(s) once  dextrose Oral Gel 15 Gram(s) once PRN  enoxaparin Injectable 40 milliGRAM(s) every 24 hours  furosemide    Tablet 40 milliGRAM(s) daily  glucagon  Injectable 1 milliGRAM(s) once  hydrALAZINE Injectable 10 milliGRAM(s) every 2 hours PRN  insulin lispro (ADMELOG) corrective regimen sliding scale   at bedtime  insulin lispro (ADMELOG) corrective regimen sliding scale   three times a day before meals  labetalol Injectable 10 milliGRAM(s) every 2 hours PRN  levalbuterol Inhalation 0.63 milliGRAM(s) every 6 hours PRN  levETIRAcetam   Injectable 1000 milliGRAM(s) every 12 hours  lidocaine   4% Patch 1 Patch every 24 hours  metoprolol tartrate 75 milliGRAM(s) two times a day  montelukast 10 milliGRAM(s) daily  polyethylene glycol 3350 17 Gram(s) daily  sacubitril 97 mG/valsartan 103 mG 1 Tablet(s) two times a day  senna Syrup 10 milliLiter(s) at bedtime  sertraline 100 milliGRAM(s) daily  spironolactone 25 milliGRAM(s) daily      RECENT LABS/IMAGING  - Reviewed Today                          HEAD CT, CTA HEAD & NECK 6/29 - Age-appropriate involutional and ischemic gliotic changes. No change in right parietal parenchymal hemorrhage since 7:42 AM. Normal CTA of the head and neck.    HEAD CT 6/30 - No change since 6/29/2024. Right small right parietal cortical parenchymal hemorrhage.    CXR 7/2 - Clear lungs on the latest film.    MRI HEAD 7/4 - Small parenchymal hemorrhage peripherally within the right parietal lobe with mild surrounding edema. No definitive enhancement, underlying lesion, or associated abnormal flow voids. Please note the presence of increased T1 signal precontrast somewhat limits evaluation for enhancement.Multifocal areas of hemosiderin compatible with prior microhemorrhages. Multiple chronic lacunar infarcts. Moderate chronic white matter ischemic changes. Findings likely related to long-standing hypertension.9 mm enhancing nodule superficial lobe right parotid gland    CT LEFT  SHOULDER 7/8 - 1.  Mildly displaced and impacted comminuted fracture involves the anteroinferior glenoid.2.  Small Hill-Sachs lesion is also present versus enthesopathic remodeling of the humeral head.  ----------------------------------------------------------------------------------------  PHYSICAL EXAM  Constitutional - NAD, Comfortable   Extremities - No calf tenderness   Neurologic Exam -                    Cognitive - AAO to self, place, date, year, situation     Communication - Fluent, Mild dysarthria     Cranial Nerves - CN 2-12 intact     FUNCTIONAL MOTOR EXAM - Mild deficits of the left UE      Sensory - Intact to LT   Psychiatric - Fatigued  ----------------------------------------------------------------------------------------  ASSESSMENT/PLAN  79yFemale with functional deficits after developing an IPH  Right parietal IPH - Stable, Convert Keppra IV to PO  Acute CHF/Uncontrolled HTN - Lopressor, Entresto, Aldactone, Norvasc, Lasix, Recommend taper Hydralazine IV & Labetalol IV  CAD - Lipitor, ASA  Pulm - Duoneb, Pulmicort, Lasix, Xopenex  Mood - Zoloft  Left Glenoid Fracture - WBAT, Sling ONLY when standing/walking  Pain - Tylenol, RECOMMEND DC Flexeril, RECOMMEND LIDODERM PATCH  DVT PPX - SCDs, Lovenox  Rehab/Impaired mobility and function - Patient continues to require hospitalization for the above diagnoses and ongoing active management of comorbid complications (continues to have IV HTN meds - at risk for cardiovascular decompensation) that are substantially posing a threat to bodily function, necessitating transfer of hospitalization at an acute inpatient rehabilitation facility for medical oversight and clinical management.     When medically optimized, based on the patient's diagnosis, current functional status and potential for progress, recommend ACUTE inpatient rehabilitation for the functional deficits consisting of 3 hours of therapy/day & 24 hour RN/daily PMR physician for active comorbid medical management. Patient will be able to tolerate 3 hours a day.    Will continue to follow. Rehab recommendations are dependent on how functional progress changes as well as how patient continues to participate and tolerate therapeutic interventions, WHICH MAY CHANGE UPON FOLLOW UP EVALUATIONS. Recommend ongoing mobilization by staff to maintain cardiopulmonary function and prevention of secondary complications related to debility. Discussed the specific management and recommendations above with rehab clinical care team/rehab liaison.      Total Time Spent on Encounter (reviewing clinical notes, labs, radiology, medications, patient history/exam, assessment and plan) - 50 minutes

## 2024-07-10 NOTE — PROGRESS NOTE ADULT - ASSESSMENT
79F with MHx significant for HTN, HLD and CAD s/p stent who takes ASA presented to [facility] this morning with AMS and urinary incontinence and her last known well approximately 530am. CTH at [place] revealed R parietal lobe hematoma    #R Parietal lobe hematoma surrounding vasogenic edema  #New onset seizures  Transfer[facility] [place]  MRI Head w/wo reviewed  ASA resumed 7/6  Atorvastatin when able to tolerate PO  KEPPRA 1000mg BID - no driving until outpatient neurology followup   Seizure precautions  CT neck w/o significant stenosis    #Left shoulder pain - reports pain after moving off a stretcher, active ROM limited   xray without acute findings  CT shoulder with Mildly displaced and impacted comminuted fracture involves the anteroinferior glenoid   Ortho note janice- WBAT LUE with sling for comfort.   lidocaine patch, add PRN flexiril     #Stridor - resolved   S/p intubation for 22hrs, now on RA  S/p laryngoscopy w concern for subglottic stenosis  Heliox therapy, racemic epi  Steroids- solumedrol 40mg IV q6, change to PO Prednisone x 5 days  ENT eval noted, outpt f/u  CT chest noted with mass effect -- d/w radiation, that is a anatomic variant, no clinical significance     #HTN, HLD and CAD s/p stent  #HFpEF  Entresto, aldactone, lopressor, Amlodipine   Statin  Appreciate cards recs - will c/w PO lasix QD  ASA    #Troponemia  Likely demand from above, trended down and remained flat  No chest pain or ischemic ekg changes    #Psych  Zoloft    #GERD  Pronotix    DVT ppx: Lovenox    S/S eval- Regular diet  PT- Medically stable for Acute rehab. Auth started.    D/w Daughter and Cm/SW

## 2024-07-10 NOTE — PROGRESS NOTE ADULT - SUBJECTIVE AND OBJECTIVE BOX
Patient is a 79y old  Female who presents with a chief complaint of IPH ?seizure (09 Jul 2024 10:38)      SUBJECTIVE / OVERNIGHT EVENTS: Seen and examined. Had episode of vomitus overnight. Feels well this am. Daughter at bedside. Denies chest pain, SOB, abd pain, N/V, fever, chills, dysuria or any other complaints. All remainder ROS negative.     MEDICATIONS  (STANDING):  amLODIPine   Tablet 10 milliGRAM(s) Oral daily  aspirin  chewable 81 milliGRAM(s) Oral daily  atorvastatin 40 milliGRAM(s) Oral at bedtime  dextrose 10% Bolus 125 milliLiter(s) IV Bolus once  dextrose 5%. 1000 milliLiter(s) (50 mL/Hr) IV Continuous <Continuous>  dextrose 5%. 1000 milliLiter(s) (100 mL/Hr) IV Continuous <Continuous>  dextrose 50% Injectable 25 Gram(s) IV Push once  dextrose 50% Injectable 12.5 Gram(s) IV Push once  enoxaparin Injectable 40 milliGRAM(s) SubCutaneous every 24 hours  furosemide    Tablet 40 milliGRAM(s) Oral daily  glucagon  Injectable 1 milliGRAM(s) IntraMuscular once  insulin lispro (ADMELOG) corrective regimen sliding scale   SubCutaneous three times a day before meals  insulin lispro (ADMELOG) corrective regimen sliding scale   SubCutaneous at bedtime  levETIRAcetam   Injectable 1000 milliGRAM(s) IV Push every 12 hours  lidocaine   4% Patch 1 Patch Transdermal every 24 hours  metoprolol tartrate 75 milliGRAM(s) Oral two times a day  montelukast 10 milliGRAM(s) Oral daily  polyethylene glycol 3350 17 Gram(s) Oral daily  sacubitril 97 mG/valsartan 103 mG 1 Tablet(s) Oral two times a day  senna Syrup 10 milliLiter(s) Oral at bedtime  sertraline 100 milliGRAM(s) Oral daily  spironolactone 25 milliGRAM(s) Oral daily    MEDICATIONS  (PRN):  acetaminophen     Tablet .. 650 milliGRAM(s) Oral every 6 hours PRN Temp greater or equal to 38C (100.4F), Mild Pain (1 - 3)  benzocaine/menthol Lozenge 1 Lozenge Oral four times a day PRN Sore Throat  cyclobenzaprine 5 milliGRAM(s) Oral three times a day PRN Muscle Spasm  dextrose Oral Gel 15 Gram(s) Oral once PRN Blood Glucose LESS THAN 70 milliGRAM(s)/deciliter  hydrALAZINE Injectable 10 milliGRAM(s) IV Push every 2 hours PRN Sbp >160  labetalol Injectable 10 milliGRAM(s) IV Push every 2 hours PRN SBP>160  levalbuterol Inhalation 0.63 milliGRAM(s) Inhalation every 6 hours PRN SOB/wheezing        I&O's Summary      PHYSICAL EXAM:  Vital Signs Last 24 Hrs  T(C): 36.6 (10 Jul 2024 05:02), Max: 36.9 (09 Jul 2024 16:59)  T(F): 97.8 (10 Jul 2024 05:02), Max: 98.4 (09 Jul 2024 16:59)  HR: 51 (10 Jul 2024 05:02) (51 - 80)  BP: 120/67 (10 Jul 2024 05:02) (120/67 - 138/70)  BP(mean): 97 (09 Jul 2024 20:43) (97 - 97)  RR: 18 (10 Jul 2024 05:02) (18 - 19)  SpO2: 95% (10 Jul 2024 05:02) (94% - 97%)    Parameters below as of 10 Jul 2024 05:02  Patient On (Oxygen Delivery Method): room air        CONSTITUTIONAL: NAD, pleasant  RESPIRATORY: Normal respiratory effort; lungs are clear to auscultation bilaterally  CARDIOVASCULAR: Regular rate and rhythm, no LE edema  ABDOMEN: Nontender to palpation, normoactive bowel sounds  MSK: TTP of the left shoulder with movement, especially when lifting above horizontal   PSYCH: A+O x3; affect appropriate  NEUROLOGY: CN 2-12 are intact and symmetric; no gross sensory deficits    LABS:                    CAPILLARY BLOOD GLUCOSE      POCT Blood Glucose.: 106 mg/dL (10 Jul 2024 08:21)  POCT Blood Glucose.: 125 mg/dL (09 Jul 2024 22:22)  POCT Blood Glucose.: 138 mg/dL (09 Jul 2024 17:34)  POCT Blood Glucose.: 174 mg/dL (09 Jul 2024 12:43)        RADIOLOGY & ADDITIONAL TESTS:  Results Reviewed:   Imaging Personally Reviewed:  Electrocardiogram Personally Reviewed:

## 2024-07-11 VITALS
SYSTOLIC BLOOD PRESSURE: 137 MMHG | HEART RATE: 68 BPM | RESPIRATION RATE: 18 BRPM | OXYGEN SATURATION: 97 % | DIASTOLIC BLOOD PRESSURE: 73 MMHG | TEMPERATURE: 98 F

## 2024-07-11 LAB
GLUCOSE BLDC GLUCOMTR-MCNC: 100 MG/DL — HIGH (ref 70–99)
GLUCOSE BLDC GLUCOMTR-MCNC: 132 MG/DL — HIGH (ref 70–99)
GLUCOSE BLDC GLUCOMTR-MCNC: 92 MG/DL — SIGNIFICANT CHANGE UP (ref 70–99)

## 2024-07-11 PROCEDURE — 94660 CPAP INITIATION&MGMT: CPT

## 2024-07-11 PROCEDURE — 97167 OT EVAL HIGH COMPLEX 60 MIN: CPT

## 2024-07-11 PROCEDURE — 86036 ANCA SCREEN EACH ANTIBODY: CPT

## 2024-07-11 PROCEDURE — 93005 ELECTROCARDIOGRAM TRACING: CPT

## 2024-07-11 PROCEDURE — 85610 PROTHROMBIN TIME: CPT

## 2024-07-11 PROCEDURE — 93306 TTE W/DOPPLER COMPLETE: CPT

## 2024-07-11 PROCEDURE — 82140 ASSAY OF AMMONIA: CPT

## 2024-07-11 PROCEDURE — 85730 THROMBOPLASTIN TIME PARTIAL: CPT

## 2024-07-11 PROCEDURE — 86334 IMMUNOFIX E-PHORESIS SERUM: CPT

## 2024-07-11 PROCEDURE — 0225U NFCT DS DNA&RNA 21 SARSCOV2: CPT

## 2024-07-11 PROCEDURE — 82435 ASSAY OF BLOOD CHLORIDE: CPT

## 2024-07-11 PROCEDURE — 85014 HEMATOCRIT: CPT

## 2024-07-11 PROCEDURE — 97530 THERAPEUTIC ACTIVITIES: CPT

## 2024-07-11 PROCEDURE — 84466 ASSAY OF TRANSFERRIN: CPT

## 2024-07-11 PROCEDURE — 85025 COMPLETE CBC W/AUTO DIFF WBC: CPT

## 2024-07-11 PROCEDURE — 84295 ASSAY OF SERUM SODIUM: CPT

## 2024-07-11 PROCEDURE — 80053 COMPREHEN METABOLIC PANEL: CPT

## 2024-07-11 PROCEDURE — 82550 ASSAY OF CK (CPK): CPT

## 2024-07-11 PROCEDURE — 83521 IG LIGHT CHAINS FREE EACH: CPT

## 2024-07-11 PROCEDURE — 36415 COLL VENOUS BLD VENIPUNCTURE: CPT

## 2024-07-11 PROCEDURE — 99233 SBSQ HOSP IP/OBS HIGH 50: CPT

## 2024-07-11 PROCEDURE — 97116 GAIT TRAINING THERAPY: CPT

## 2024-07-11 PROCEDURE — 83880 ASSAY OF NATRIURETIC PEPTIDE: CPT

## 2024-07-11 PROCEDURE — 82607 VITAMIN B-12: CPT

## 2024-07-11 PROCEDURE — 70496 CT ANGIOGRAPHY HEAD: CPT | Mod: MC

## 2024-07-11 PROCEDURE — 82746 ASSAY OF FOLIC ACID SERUM: CPT

## 2024-07-11 PROCEDURE — 82803 BLOOD GASES ANY COMBINATION: CPT

## 2024-07-11 PROCEDURE — 87070 CULTURE OTHR SPECIMN AEROBIC: CPT

## 2024-07-11 PROCEDURE — 85018 HEMOGLOBIN: CPT

## 2024-07-11 PROCEDURE — 80061 LIPID PANEL: CPT

## 2024-07-11 PROCEDURE — 82330 ASSAY OF CALCIUM: CPT

## 2024-07-11 PROCEDURE — 99232 SBSQ HOSP IP/OBS MODERATE 35: CPT

## 2024-07-11 PROCEDURE — 83605 ASSAY OF LACTIC ACID: CPT

## 2024-07-11 PROCEDURE — 94002 VENT MGMT INPAT INIT DAY: CPT

## 2024-07-11 PROCEDURE — 82728 ASSAY OF FERRITIN: CPT

## 2024-07-11 PROCEDURE — 70490 CT SOFT TISSUE NECK W/O DYE: CPT | Mod: MC

## 2024-07-11 PROCEDURE — 82553 CREATINE MB FRACTION: CPT

## 2024-07-11 PROCEDURE — 70450 CT HEAD/BRAIN W/O DYE: CPT | Mod: MC

## 2024-07-11 PROCEDURE — 86850 RBC ANTIBODY SCREEN: CPT

## 2024-07-11 PROCEDURE — 71045 X-RAY EXAM CHEST 1 VIEW: CPT

## 2024-07-11 PROCEDURE — 85027 COMPLETE CBC AUTOMATED: CPT

## 2024-07-11 PROCEDURE — 97163 PT EVAL HIGH COMPLEX 45 MIN: CPT

## 2024-07-11 PROCEDURE — 83550 IRON BINDING TEST: CPT

## 2024-07-11 PROCEDURE — 70498 CT ANGIOGRAPHY NECK: CPT | Mod: MC

## 2024-07-11 PROCEDURE — 82947 ASSAY GLUCOSE BLOOD QUANT: CPT

## 2024-07-11 PROCEDURE — 84484 ASSAY OF TROPONIN QUANT: CPT

## 2024-07-11 PROCEDURE — 70553 MRI BRAIN STEM W/O & W/DYE: CPT | Mod: MC

## 2024-07-11 PROCEDURE — 94640 AIRWAY INHALATION TREATMENT: CPT

## 2024-07-11 PROCEDURE — 83036 HEMOGLOBIN GLYCOSYLATED A1C: CPT

## 2024-07-11 PROCEDURE — 87641 MR-STAPH DNA AMP PROBE: CPT

## 2024-07-11 PROCEDURE — 73030 X-RAY EXAM OF SHOULDER: CPT

## 2024-07-11 PROCEDURE — 73200 CT UPPER EXTREMITY W/O DYE: CPT | Mod: MC

## 2024-07-11 PROCEDURE — 83540 ASSAY OF IRON: CPT

## 2024-07-11 PROCEDURE — 93970 EXTREMITY STUDY: CPT

## 2024-07-11 PROCEDURE — 36600 WITHDRAWAL OF ARTERIAL BLOOD: CPT

## 2024-07-11 PROCEDURE — 87640 STAPH A DNA AMP PROBE: CPT

## 2024-07-11 PROCEDURE — 82962 GLUCOSE BLOOD TEST: CPT

## 2024-07-11 PROCEDURE — 83735 ASSAY OF MAGNESIUM: CPT

## 2024-07-11 PROCEDURE — 95700 EEG CONT REC W/VID EEG TECH: CPT

## 2024-07-11 PROCEDURE — 84132 ASSAY OF SERUM POTASSIUM: CPT

## 2024-07-11 PROCEDURE — 84100 ASSAY OF PHOSPHORUS: CPT

## 2024-07-11 PROCEDURE — 95714 VEEG EA 12-26 HR UNMNTR: CPT

## 2024-07-11 PROCEDURE — 80048 BASIC METABOLIC PNL TOTAL CA: CPT

## 2024-07-11 PROCEDURE — 84145 PROCALCITONIN (PCT): CPT

## 2024-07-11 PROCEDURE — 94003 VENT MGMT INPAT SUBQ DAY: CPT

## 2024-07-11 PROCEDURE — 86900 BLOOD TYPING SEROLOGIC ABO: CPT

## 2024-07-11 PROCEDURE — 86901 BLOOD TYPING SEROLOGIC RH(D): CPT

## 2024-07-11 PROCEDURE — 87040 BLOOD CULTURE FOR BACTERIA: CPT

## 2024-07-11 PROCEDURE — 94760 N-INVAS EAR/PLS OXIMETRY 1: CPT

## 2024-07-11 RX ORDER — SPIRONOLACTONE 25 MG/1
1 TABLET ORAL
Qty: 0 | Refills: 0 | DISCHARGE
Start: 2024-07-11

## 2024-07-11 RX ORDER — AMLODIPINE BESYLATE 2.5 MG/1
1 TABLET ORAL
Qty: 0 | Refills: 0 | DISCHARGE
Start: 2024-07-11

## 2024-07-11 RX ORDER — FUROSEMIDE 10 MG/ML
1 INJECTION, SOLUTION INTRAMUSCULAR; INTRAVENOUS
Qty: 0 | Refills: 0 | DISCHARGE
Start: 2024-07-11

## 2024-07-11 RX ORDER — SACUBITRIL AND VALSARTAN 97; 103 MG/1; MG/1
1 TABLET, FILM COATED ORAL
Qty: 0 | Refills: 0 | DISCHARGE
Start: 2024-07-11

## 2024-07-11 RX ORDER — METOPROLOL TARTRATE 50 MG
1 TABLET ORAL
Qty: 0 | Refills: 0 | DISCHARGE
Start: 2024-07-11

## 2024-07-11 RX ORDER — LEVETIRACETAM 100 MG/ML
1 INJECTION INTRAVENOUS
Qty: 60 | Refills: 0
Start: 2024-07-11 | End: 2024-08-09

## 2024-07-11 RX ORDER — POLYETHYLENE GLYCOL 3350 1 G/G
17 POWDER ORAL
Qty: 0 | Refills: 0 | DISCHARGE
Start: 2024-07-11

## 2024-07-11 RX ORDER — SENNOSIDES 8.6 MG
10 TABLET ORAL
Qty: 0 | Refills: 0 | DISCHARGE
Start: 2024-07-11

## 2024-07-11 RX ADMIN — SPIRONOLACTONE 25 MILLIGRAM(S): 25 TABLET ORAL at 05:23

## 2024-07-11 RX ADMIN — ASPIRIN 81 MILLIGRAM(S): 325 TABLET, FILM COATED ORAL at 12:40

## 2024-07-11 RX ADMIN — ENOXAPARIN SODIUM 40 MILLIGRAM(S): 100 INJECTION SUBCUTANEOUS at 17:11

## 2024-07-11 RX ADMIN — SERTRALINE HYDROCHLORIDE 100 MILLIGRAM(S): 100 TABLET, FILM COATED ORAL at 12:39

## 2024-07-11 RX ADMIN — FUROSEMIDE 40 MILLIGRAM(S): 10 INJECTION, SOLUTION INTRAMUSCULAR; INTRAVENOUS at 05:23

## 2024-07-11 RX ADMIN — MONTELUKAST SODIUM 10 MILLIGRAM(S): 10 TABLET, FILM COATED ORAL at 12:39

## 2024-07-11 RX ADMIN — LEVETIRACETAM 1000 MILLIGRAM(S): 100 INJECTION INTRAVENOUS at 05:22

## 2024-07-11 RX ADMIN — Medication 75 MILLIGRAM(S): at 17:10

## 2024-07-11 RX ADMIN — SACUBITRIL AND VALSARTAN 1 TABLET(S): 97; 103 TABLET, FILM COATED ORAL at 05:23

## 2024-07-11 RX ADMIN — POLYETHYLENE GLYCOL 3350 17 GRAM(S): 1 POWDER ORAL at 12:42

## 2024-07-11 RX ADMIN — AMLODIPINE BESYLATE 10 MILLIGRAM(S): 2.5 TABLET ORAL at 05:23

## 2024-07-11 RX ADMIN — SACUBITRIL AND VALSARTAN 1 TABLET(S): 97; 103 TABLET, FILM COATED ORAL at 17:14

## 2024-07-11 RX ADMIN — LEVETIRACETAM 1000 MILLIGRAM(S): 100 INJECTION INTRAVENOUS at 17:11

## 2024-07-11 NOTE — PROGRESS NOTE ADULT - PROVIDER SPECIALTY LIST ADULT
Cardiology
Hospitalist
Neurology
Neurology
Physiatry
Physiatry
Pulmonology
ENT
Hospitalist
Hospitalist
NSICU
Neurology
Physiatry
Hospitalist
Hospitalist
Neurology
Neurology
Pulmonology
Hospitalist
Hospitalist
Cardiology

## 2024-07-11 NOTE — PROGRESS NOTE ADULT - SUBJECTIVE AND OBJECTIVE BOX
Patient fatigued.  Did get up to walk several times overnight.  Daughter at bedside.  As per discussion with CM, still pending auth for [facility] and have sent updated clinicals to insurance.    FUNCTIONAL PROGRESS  7/11 PT  Bed Mobility  Bed Mobility Training Supine-to-Sit: minimum assist (75% patient effort);  1 person assist    Sit-Stand Transfer Training  Transfer Training Sit-to-Stand Transfer: minimum assist (75% patient effort);  1 person assist;  hand held. Pt c/o left shoulder pain - has a small fx. Per ortho consult WBAT and sling for comfort.   Transfer Training Stand-to-Sit Transfer: minimum assist (75% patient effort);  1 person assist    Gait Training  Gait Training: minimum assist (75% patient effort);  1 person assist;  HHA    15 feet;  x 2 with sitting rest on toilet  Gait Analysis: 3-point gait   impaired balance;  pain;  left shoulder    7/11 OT  Toilet Transfer Training  Transfer Training Toilet Transfer: minimum assist (75% patient effort);  1 person assist;  nonverbal cues (demo/gestures);  verbal cues;  moderate assist (50% patient effort);  low toilet with use of R grab bar, pt would benefit from commode placed over toilet to increase independence and safety with transfer. RN and floor staff made aware;  hand held assist 2*pain in L shoulder   grab bars  Toilet Transfer Training Transfer Safety Analysis: decreased weight-shifting ability;  decreased strength;  impaired balance;  pain;  grab bars    Upper Body Dressing Training  Upper Body Dressing Training Assistance: minimum assist (75% patient effort);  to don/doff gown seated;  decreased ROM;  decreased strength;  impaired balance;  pain    Grooming Training  Grooming Training Assistance: minimum assist (75% patient effort);  1 person assist;  nonverbal cues (demo/gestures);  verbal cues;  standing at sink to wash hands;  decreased ROM;  decreased strength;  impaired balance;  pain    Toilet Training  Toilet Training Assistance: minimum assist (75% patient effort);  1 person assist;  nonverbal cues (demo/gestures);  verbal cues;  for pericare, thoroughness and clothing management;  decreased ROM;  impaired balance;  decreased strength;  pain    VITALS  T(C): 36.6 (07-11-24 @ 04:35), Max: 36.8 (07-10-24 @ 20:32)  HR: 62 (07-11-24 @ 04:35) (54 - 63)  BP: 122/72 (07-11-24 @ 04:35) (109/60 - 132/80)  RR: 18 (07-11-24 @ 04:35) (18 - 18)  SpO2: 96% (07-11-24 @ 04:35) (96% - 98%)  Wt(kg): --    MEDICATIONS   acetaminophen     Tablet .. 650 milliGRAM(s) every 6 hours PRN  amLODIPine   Tablet 10 milliGRAM(s) daily  aspirin  chewable 81 milliGRAM(s) daily  atorvastatin 40 milliGRAM(s) at bedtime  benzocaine/menthol Lozenge 1 Lozenge four times a day PRN  cyclobenzaprine 5 milliGRAM(s) three times a day PRN  dextrose 10% Bolus 125 milliLiter(s) once  dextrose 5%. 1000 milliLiter(s) <Continuous>  dextrose 5%. 1000 milliLiter(s) <Continuous>  dextrose 50% Injectable 25 Gram(s) once  dextrose 50% Injectable 12.5 Gram(s) once  dextrose Oral Gel 15 Gram(s) once PRN  enoxaparin Injectable 40 milliGRAM(s) every 24 hours  furosemide    Tablet 40 milliGRAM(s) daily  glucagon  Injectable 1 milliGRAM(s) once  hydrALAZINE Injectable 10 milliGRAM(s) every 2 hours PRN  insulin lispro (ADMELOG) corrective regimen sliding scale   at bedtime  insulin lispro (ADMELOG) corrective regimen sliding scale   three times a day before meals  labetalol Injectable 10 milliGRAM(s) every 2 hours PRN  levalbuterol Inhalation 0.63 milliGRAM(s) every 6 hours PRN  levETIRAcetam   Injectable 1000 milliGRAM(s) every 12 hours  lidocaine   4% Patch 1 Patch every 24 hours  metoprolol tartrate 75 milliGRAM(s) two times a day  montelukast 10 milliGRAM(s) daily  polyethylene glycol 3350 17 Gram(s) daily  sacubitril 97 mG/valsartan 103 mG 1 Tablet(s) two times a day  senna Syrup 10 milliLiter(s) at bedtime  sertraline 100 milliGRAM(s) daily  spironolactone 25 milliGRAM(s) daily      RECENT LABS/IMAGING  - Reviewed Today                            HEAD CT, CTA HEAD & NECK 6/29 - Age-appropriate involutional and ischemic gliotic changes. No change in right parietal parenchymal hemorrhage since 7:42 AM. Normal CTA of the head and neck.    HEAD CT 6/30 - No change since 6/29/2024. Right small right parietal cortical parenchymal hemorrhage.    CXR 7/2 - Clear lungs on the latest film.    MRI HEAD 7/4 - Small parenchymal hemorrhage peripherally within the right parietal lobe with mild surrounding edema. No definitive enhancement, underlying lesion, or associated abnormal flow voids. Please note the presence of increased T1 signal precontrast somewhat limits evaluation for enhancement.Multifocal areas of hemosiderin compatible with prior microhemorrhages. Multiple chronic lacunar infarcts. Moderate chronic white matter ischemic changes. Findings likely related to long-standing hypertension.9 mm enhancing nodule superficial lobe right parotid gland    CT LEFT  SHOULDER 7/8 - 1.  Mildly displaced and impacted comminuted fracture involves the anteroinferior glenoid.2.  Small Hill-Sachs lesion is also present versus enthesopathic remodeling of the humeral head.  ----------------------------------------------------------------------------------------  PHYSICAL EXAM  Constitutional - NAD, Comfortable   Extremities - No calf tenderness   Neurologic Exam -                    Cognitive - AAO to self, place, date, year, situation     Communication - Fluent, Mild dysarthria     FUNCTIONAL MOTOR EXAM - No focal deficits     Sensory - Intact to LT   Psychiatric - Fatigued  ----------------------------------------------------------------------------------------  ASSESSMENT/PLAN  79yFemale with functional deficits after developing an IPH  Right parietal IPH - Stable, RECOMMEND Convert Keppra IV to PO  Acute CHF/Uncontrolled HTN - Lopressor, Entresto, Aldactone, Norvasc, Lasix, Recommend taper Hydralazine IV & Labetalol IV  CAD - Lipitor, ASA  Pulm - Duoneb, Pulmicort, Lasix, Xopenex  Mood - Zoloft  Left Glenoid Fracture - WBAT, Sling ONLY when standing/walking  Pain - Tylenol, RECOMMEND DC Flexeril, RECOMMEND LIDODERM PATCH to left shoulder  DVT PPX - SCDs, Lovenox  Rehab/Impaired mobility and function - Patient continues to require hospitalization for the above diagnoses and ongoing active management of comorbid complications (continues to have IV HTN meds - at risk for cardiovascular decompensation) that are substantially posing a threat to bodily function, necessitating transfer of hospitalization at an acute inpatient rehabilitation facility for medical oversight and clinical management.     When medically optimized, based on the patient's diagnosis, current functional status and potential for progress, recommend ACUTE inpatient rehabilitation for the functional deficits consisting of 3 hours of therapy/day & 24 hour RN/daily PMR physician for active comorbid medical management. Patient will be able to tolerate 3 hours a day.    Will continue to follow. Rehab recommendations are dependent on how functional progress changes as well as how patient continues to participate and tolerate therapeutic interventions, WHICH MAY CHANGE UPON FOLLOW UP EVALUATIONS. Recommend ongoing mobilization by staff to maintain cardiopulmonary function and prevention of secondary complications related to debility. Discussed the specific management and recommendations above with rehab clinical care team/rehab liaison.

## 2024-07-11 NOTE — PROGRESS NOTE ADULT - ASSESSMENT
79F with MHx significant for HTN, HLD and CAD s/p stent who takes ASA presented to [facility] this morning with AMS and urinary incontinence and her last known well approximately 530am. CTH at [facility] revealed R parietal lobe hematoma    #R Parietal lobe hematoma surrounding vasogenic edema  #New onset seizures  Transferred from [facility]  MRI Head w/wo reviewed  ASA resumed 7/6  Atorvastatin when able to tolerate PO  KEPPRA 1000mg BID - no driving until outpatient neurology followup   Seizure precautions  CT neck w/o significant stenosis    #Left shoulder pain - reports pain after moving off a stretcher, active ROM limited   xray without acute findings  CT shoulder with Mildly displaced and impacted comminuted fracture involves the anteroinferior glenoid   Ortho note janice- WBAT LUE with sling for comfort.   lidocaine patch, add PRN flexiril     #Stridor - resolved   S/p intubation for 22hrs, now on RA  S/p laryngoscopy w concern for subglottic stenosis  Heliox therapy, racemic epi  Steroids- solumedrol 40mg IV q6, change to PO Prednisone x 5 days  ENT eval noted, outpt f/u  CT chest noted with mass effect -- d/w radiation, that is a anatomic variant, no clinical significance     #HTN, HLD and CAD s/p stent  #HFpEF  Entresto, aldactone, lopressor, Amlodipine   Statin  Appreciate cards recs - will c/w PO lasix QD  ASA    #Troponemia  Likely demand from above, trended down and remained flat  No chest pain or ischemic ekg changes    #Psych  Zoloft    #GERD  Pronotix    DVT ppx: Lovenox    S/S eval- Regular diet  PT- Medically stable for Acute rehab. Auth started.    D/w Daughter and Cm/SW

## 2024-07-11 NOTE — PROGRESS NOTE ADULT - SUBJECTIVE AND OBJECTIVE BOX
Patient is a 79y old  Female who presents with a chief complaint of IPH ?seizure (10 Jul 2024 11:16)      SUBJECTIVE / OVERNIGHT EVENTS: Seen and examined. Feels ok. Daughter at bedside frustrated due to delay in auth. Denies chest pain, SOB, abd pain, N/V, fever, chills, dysuria or any other complaints. All remainder ROS negative.     MEDICATIONS  (STANDING):  amLODIPine   Tablet 10 milliGRAM(s) Oral daily  aspirin  chewable 81 milliGRAM(s) Oral daily  atorvastatin 40 milliGRAM(s) Oral at bedtime  dextrose 10% Bolus 125 milliLiter(s) IV Bolus once  dextrose 5%. 1000 milliLiter(s) (50 mL/Hr) IV Continuous <Continuous>  dextrose 5%. 1000 milliLiter(s) (100 mL/Hr) IV Continuous <Continuous>  dextrose 50% Injectable 12.5 Gram(s) IV Push once  dextrose 50% Injectable 25 Gram(s) IV Push once  enoxaparin Injectable 40 milliGRAM(s) SubCutaneous every 24 hours  furosemide    Tablet 40 milliGRAM(s) Oral daily  glucagon  Injectable 1 milliGRAM(s) IntraMuscular once  insulin lispro (ADMELOG) corrective regimen sliding scale   SubCutaneous at bedtime  insulin lispro (ADMELOG) corrective regimen sliding scale   SubCutaneous three times a day before meals  levETIRAcetam   Injectable 1000 milliGRAM(s) IV Push every 12 hours  lidocaine   4% Patch 1 Patch Transdermal every 24 hours  metoprolol tartrate 75 milliGRAM(s) Oral two times a day  montelukast 10 milliGRAM(s) Oral daily  polyethylene glycol 3350 17 Gram(s) Oral daily  sacubitril 97 mG/valsartan 103 mG 1 Tablet(s) Oral two times a day  senna Syrup 10 milliLiter(s) Oral at bedtime  sertraline 100 milliGRAM(s) Oral daily  spironolactone 25 milliGRAM(s) Oral daily    MEDICATIONS  (PRN):  acetaminophen     Tablet .. 650 milliGRAM(s) Oral every 6 hours PRN Temp greater or equal to 38C (100.4F), Mild Pain (1 - 3)  benzocaine/menthol Lozenge 1 Lozenge Oral four times a day PRN Sore Throat  cyclobenzaprine 5 milliGRAM(s) Oral three times a day PRN Muscle Spasm  dextrose Oral Gel 15 Gram(s) Oral once PRN Blood Glucose LESS THAN 70 milliGRAM(s)/deciliter  hydrALAZINE Injectable 10 milliGRAM(s) IV Push every 2 hours PRN Sbp >160  labetalol Injectable 10 milliGRAM(s) IV Push every 2 hours PRN SBP>160  levalbuterol Inhalation 0.63 milliGRAM(s) Inhalation every 6 hours PRN SOB/wheezing        I&O's Summary      PHYSICAL EXAM:  Vital Signs Last 24 Hrs  T(C): 36.6 (11 Jul 2024 04:35), Max: 36.8 (10 Jul 2024 20:32)  T(F): 97.8 (11 Jul 2024 04:35), Max: 98.3 (10 Jul 2024 20:32)  HR: 62 (11 Jul 2024 04:35) (54 - 63)  BP: 122/72 (11 Jul 2024 04:35) (109/60 - 132/80)  BP(mean): --  RR: 18 (11 Jul 2024 04:35) (18 - 18)  SpO2: 96% (11 Jul 2024 04:35) (96% - 98%)    Parameters below as of 11 Jul 2024 04:35  Patient On (Oxygen Delivery Method): room air          CONSTITUTIONAL: NAD, pleasant  RESPIRATORY: Normal respiratory effort; lungs are clear to auscultation bilaterally  CARDIOVASCULAR: Regular rate and rhythm, no LE edema  ABDOMEN: Nontender to palpation, normoactive bowel sounds  MSK: TTP of the left shoulder with movement, especially when lifting above horizontal   PSYCH: A+O x3; affect appropriate  NEUROLOGY: CN 2-12 are intact and symmetric; no gross sensory deficits    LABS:                    CAPILLARY BLOOD GLUCOSE      POCT Blood Glucose.: 92 mg/dL (11 Jul 2024 08:22)  POCT Blood Glucose.: 95 mg/dL (10 Jul 2024 21:32)  POCT Blood Glucose.: 164 mg/dL (10 Jul 2024 16:44)  POCT Blood Glucose.: 135 mg/dL (10 Jul 2024 11:59)        RADIOLOGY & ADDITIONAL TESTS:  Results Reviewed:   Imaging Personally Reviewed:  Electrocardiogram Personally Reviewed:

## 2024-07-11 NOTE — PROGRESS NOTE ADULT - REASON FOR ADMISSION
IPH ?seizure

## 2024-07-12 ENCOUNTER — NON-APPOINTMENT (OUTPATIENT)
Age: 79
End: 2024-07-12

## 2024-07-16 RX ORDER — ROSUVASTATIN CALCIUM 20 MG/1
1 TABLET ORAL
Refills: 0 | DISCHARGE

## 2024-07-16 RX ORDER — SERTRALINE HYDROCHLORIDE 100 MG/1
1 TABLET, FILM COATED ORAL
Refills: 0 | DISCHARGE

## 2024-07-16 RX ORDER — METOPROLOL TARTRATE 50 MG
1 TABLET ORAL
Refills: 0 | DISCHARGE

## 2024-07-16 RX ORDER — ASPIRIN 325 MG/1
0 TABLET, FILM COATED ORAL
Refills: 0 | DISCHARGE

## 2024-07-16 RX ORDER — MONTELUKAST SODIUM 10 MG/1
1 TABLET, FILM COATED ORAL
Refills: 0 | DISCHARGE

## 2024-07-16 RX ORDER — LOSARTAN POTASSIUM 100 MG/1
1 TABLET, FILM COATED ORAL
Refills: 0 | DISCHARGE

## 2024-08-01 ENCOUNTER — APPOINTMENT (OUTPATIENT)
Dept: NEUROLOGY | Facility: CLINIC | Age: 79
End: 2024-08-01
Payer: MEDICARE

## 2024-08-01 VITALS
OXYGEN SATURATION: 97 % | SYSTOLIC BLOOD PRESSURE: 99 MMHG | HEART RATE: 51 BPM | HEIGHT: 62 IN | BODY MASS INDEX: 29.44 KG/M2 | WEIGHT: 160 LBS | DIASTOLIC BLOOD PRESSURE: 67 MMHG

## 2024-08-01 DIAGNOSIS — I62.9 NONTRAUMATIC INTRACRANIAL HEMORRHAGE, UNSPECIFIED: ICD-10-CM

## 2024-08-01 PROCEDURE — 99215 OFFICE O/P EST HI 40 MIN: CPT

## 2024-08-01 PROCEDURE — G2211 COMPLEX E/M VISIT ADD ON: CPT

## 2024-08-01 PROCEDURE — 99205 OFFICE O/P NEW HI 60 MIN: CPT

## 2024-08-01 RX ORDER — METOPROLOL TARTRATE 75 MG/1
TABLET, FILM COATED ORAL
Refills: 0 | Status: ACTIVE | COMMUNITY

## 2024-08-01 RX ORDER — ROSUVASTATIN CALCIUM 10 MG/1
10 TABLET, FILM COATED ORAL
Refills: 0 | Status: ACTIVE | COMMUNITY

## 2024-08-01 RX ORDER — SERTRALINE HYDROCHLORIDE 100 MG/1
100 TABLET, FILM COATED ORAL
Refills: 0 | Status: ACTIVE | COMMUNITY

## 2024-08-01 RX ORDER — LEVETIRACETAM 500 MG/1
500 TABLET, FILM COATED ORAL
Refills: 0 | Status: ACTIVE | COMMUNITY

## 2024-08-01 RX ORDER — MONTELUKAST 10 MG/1
10 TABLET, FILM COATED ORAL
Refills: 0 | Status: ACTIVE | COMMUNITY

## 2024-08-01 RX ORDER — FUROSEMIDE 80 MG/1
TABLET ORAL
Refills: 0 | Status: ACTIVE | COMMUNITY

## 2024-08-01 RX ORDER — SPIRONOLACTONE 25 MG/1
25 TABLET ORAL
Refills: 0 | Status: ACTIVE | COMMUNITY

## 2024-08-01 RX ORDER — AMLODIPINE BESYLATE 5 MG/1
TABLET ORAL
Refills: 0 | Status: ACTIVE | COMMUNITY

## 2024-08-01 NOTE — ASSESSMENT
[FreeTextEntry1] : 80 y/o Female with PMHx significant for HTN, HLD and CAD s/p stent who presents for hospital follow up of R parietal IPH in June 2024. CTA head neg for vascular abnormalities.  Her ED course c/b suspected seizures. MRI brain showing small parenchymal hemorrhage right parietal lobe with mild surrounding edema. No underlying lesion. Multifocal areas of hemosiderin compatible with prior microhemorrhages. Multiple chronic lacunar infarcts.  Etiology of hemorrhage possibly hypertensive, but cannot rule out amyloid angiopathy given previous areas of microhemorrhages noted in both cortical and subcortical region.   --MRI brain showing small parenchymal hemorrhage right parietal lobe with mild surrounding edema. No underlying lesion. Multifocal areas of hemosiderin compatible with prior microhemorrhages both in subcortical and cortical regions that are suggestive of cerebral amyloid angiopathy.  Also noted is chronic small vessel disease with multiple chronic lacunar infarcts.    PLAN: Right parietal lobe IPH (6/24) -ASA restarted for hx of chronic infarcts on MRI given BP is well controlled. R/A/B discussed with family -LDL 47, cont Atorvastatin 40mg PO daily -A1C 5.8 - Cont. 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 Would continue for at least 6 months.  - Seizure precautions discussed  Longstanding HTN - Referral to Cardiology to establish care

## 2024-08-01 NOTE — HISTORY OF PRESENT ILLNESS
[FreeTextEntry1] :  [facility] NEUROLOGY AT [place]  CC: IPH HPI: 78 y/o Female with PMHx significant for HTN, HLD and CAD s/p stent who presents for hospital follow up of R parietal IPH in June 2024.    Neuro hx:  Presented to [facility] the morning of 6/29/24 with AMS and urinary incontinence, with last known well approximately 530am. CTH at [place] revealed R parietal lobe hematoma. CTA head and neck were neg. Patient was transferred to [facility] for further management. Of note, patient recieved 3mg ativan, haldol and 1g keppra prior to arrival at [facility] ED for both agitation and potential seizures. Per initial triage note at [place], at home patient became unresponsive, foaming at mouth, clenching jaw, snoring respirations with urinary incontinence and tongue biting. Once in ED, ED providers concerned for patient's airway as she was gargling with respirations and she was intubated for airway protection. She was on a cardene gtt, but this was thought to be secondary to possible seizure.  Patient extubated 6/30/24, tolerating room air, doing well. Etiology of hemorrhage possibly amyloid angiopathy.  Hospital course c/b audible wheezing.    Today 8/1/24:  Went to [facility] rehab for two weeks.  Keppra reduced to 500mg BID due to lethargy while in rehab.  No seizures since then. PT now coming to the house.  OT will start. She complains of fatigue today. She is on two diuretics so urinating every hour, so is not sleeping well.  Otherwise no other issues.

## 2024-08-01 NOTE — PHYSICAL EXAM
[FreeTextEntry1] :   General: Cooperative, NAD HEENT: NC/AT, no carotid bruits Lungs: CTAB Chest: RRR, no murmurs Extremities: nontender, no erythema Neurological Examination: NIHSS: 1 MS: Alert  oriented to self, knows month, year, age,  Appropriately interactive, normal affect. Speech fluent w/o paraphasic errors CN: PERLL, EOMI, V1-3 sensation intact, face symmetric, hearing intact, palate elevates symmetrically, tongue midline, SCM equal bilaterally Motor: normal bulk and tone, no tremor, rigidity or bradykinesia.  5/5 in RUE, RLE is 4+/5, Left deltoid limited due to pain is only 3/5, Rest of left arm is 4/5, Left leg 4/5 Sens: Intact to light touch. Reflexes: 2/4 all over, downgoing toes b/l Coord:  No dysmetria, slow on the left side Gait: slow, not ataxic, wobbly knees

## 2024-08-01 NOTE — RESULTS/DATA
[TextEntry] : CT Head No Cont (06.30.24 @ 09:41) IMPRESSION: No change since 6/29/2024. Right small right parietal  cortical parenchymal hemorrhage.  CT Angio Head w/ IV Cont (06.29.24 @ 13:48) Impression: Age-appropriate involutional and ischemic gliotic changes. No  change in right parietal parenchymal hemorrhage since 7:42 AM. Normal CTA  of the head and neck.  CT Head No Cont (06.29.24 @ 07:48) IMPRESSION: 1. 1.7 cm acute juxtacortical hematoma with surrounding vasogenic edema,  right parietal lobe. Recommend follow-up imaging, as underlying lesion  cannot be excluded. 2. Moderate-severe white matter hypodensities are nonspecific however  statistically reflects chronic microvascular ischemic change. 3. Additional findings described in detail above.  TTE W or WO Ultrasound Enhancing Agent (06.30.24 @ 08:50) CONCLUSIONS:  1. Left ventricular cavity is normal in size. Left ventricular systolic function is normal with an ejection fraction visually estimated at 60 to 65 %.  2. There is mild (grade 1) left ventricular diastolic dysfunction.  3. Normal right ventricular cavity size and normal right ventricular systolic function.  4. The left atrium is normal in size.  5. The right atrium is normal in size.  6. Structurally normal mitral valve with normal leaflet excursion.  7. No pericardial effusion seen.  8. Mild tricuspid regurgitation.  9. Estimated pulmonary artery systolic pressure is 38 mmHg. 10. Mild left ventricular hypertrophy. 11. Technically difficult image quality. 12. Trileaflet aortic valve with normal systolic excursion. Fibrocalcific aortic valve sclerosis without stenosis.  MRI Brain w/wo 6/24: Small parenchymal hemorrhage peripherally within the right parietal lobe with mild surrounding edema. No definitive enhancement, underlying lesion, or associated abnormal flow voids. Please note the presence of increased T1 signal precontrast somewhat limits evaluation for enhancement. Multifocal areas of hemosiderin compatible with prior microhemorrhages. Multiple chronic lacunar infarcts. Moderate chronic white matter ischemic changes. Findings likely related to long-standing hypertension. 9 mm enhancing nodule superficial lobe right parotid gland

## 2024-08-28 ENCOUNTER — APPOINTMENT (OUTPATIENT)
Dept: CARDIOLOGY | Facility: CLINIC | Age: 79
End: 2024-08-28

## 2024-08-28 VITALS
BODY MASS INDEX: 30 KG/M2 | WEIGHT: 163 LBS | SYSTOLIC BLOOD PRESSURE: 113 MMHG | DIASTOLIC BLOOD PRESSURE: 68 MMHG | OXYGEN SATURATION: 93 % | HEART RATE: 66 BPM | HEIGHT: 62 IN

## 2024-08-28 DIAGNOSIS — I10 ESSENTIAL (PRIMARY) HYPERTENSION: ICD-10-CM

## 2024-08-28 DIAGNOSIS — I50.30 UNSPECIFIED DIASTOLIC (CONGESTIVE) HEART FAILURE: ICD-10-CM

## 2024-08-28 DIAGNOSIS — I62.9 NONTRAUMATIC INTRACRANIAL HEMORRHAGE, UNSPECIFIED: ICD-10-CM

## 2024-08-28 PROCEDURE — 99205 OFFICE O/P NEW HI 60 MIN: CPT

## 2024-08-28 PROCEDURE — 99215 OFFICE O/P EST HI 40 MIN: CPT

## 2024-08-28 PROCEDURE — G2211 COMPLEX E/M VISIT ADD ON: CPT

## 2024-08-28 PROCEDURE — 93000 ELECTROCARDIOGRAM COMPLETE: CPT

## 2024-08-28 RX ORDER — SPIRONOLACTONE 25 MG/1
25 TABLET ORAL
Qty: 90 | Refills: 3 | Status: ACTIVE | COMMUNITY
Start: 2024-08-28

## 2024-08-28 RX ORDER — SACUBITRIL AND VALSARTAN 97; 103 MG/1; MG/1
97-103 TABLET, FILM COATED ORAL
Refills: 0 | Status: ACTIVE | COMMUNITY
Start: 2024-08-28

## 2024-09-03 NOTE — HISTORY OF PRESENT ILLNESS
[FreeTextEntry1] : 79 year old woman with a history of HTN, HLD and CAD s/p PCI in 2000, subdural hematoma presents for an initial cardaice evaluation.   She presented to [facility] the morning of 6/29/24 with AMS and urinary incontinence,  . Per initial triage note at [place], at home patient became unresponsive, foaming at mouth, clenching jaw, snoring respirations with urinary incontinence and tongue biting. Once in ED, ED providers concerned for patient's airway as she was gargling with respirations and she was intubated for airway protection. She was on a cardene gtt, but this was thought to be secondary to possible seizure. CTH at [place] revealed R parietal lobe hematoma. CTA head and neck were neg. Patient was transferred to [facility] for further management. Of note, patient received 3mg ativan, haldol and 1g keppra prior to arrival at [facility] ED for both agitation and potential seizures Patient extubated 6/30/24, tolerating room air, doing well. Etiology of hemorrhage possibly amyloid angiopathy.   MRI brain showing small parenchymal hemorrhage right parietal lobe with mild surrounding edema. No underlying lesion. Multifocal areas of hemosiderin compatible with prior microhemorrhages. Multiple chronic lacunar infarcts. I personally reviewed all of the hospital records available to me at this time, which included but are not limited to the discharge summary, labs and imaging reports.   Sicne her discharge she has been feeling headaches daily. She has been having more left arm pain from a shoulder injury after the seizure. She has been complaining of increased fatigue. She snores. She has been complaining of dyspnea on minimal exertion.  She   denies any chest pain, PND, orthopnea, near syncope, syncope, strokelike symptoms.

## 2024-09-03 NOTE — HISTORY OF PRESENT ILLNESS
[FreeTextEntry1] : 79 year old woman with a history of HTN, HLD and CAD s/p PCI in 2000, subdural hematoma presents for an initial cardaice evaluation.   She presented to [facility] the morning of 6/29/24 with AMS and urinary incontinence,  . Per initial triage note at [place], at home patient became unresponsive, foaming at mouth, clenching jaw, snoring respirations with urinary incontinence and tongue biting. Once in ED, ED providers concerned for patient's airway as she was gargling with respirations and she was intubated for airway protection. She was on a cardene gtt, but this was thought to be secondary to possible seizure. CTH at [place] revealed R parietal lobe hematoma. CTA head and neck were neg. Patient was transferred to [facility] for further management. Of note, patient received 3mg ativan, haldol and 1g keppra prior to arrival at [facility] ED for both agitation and potential seizures Patient extubated 6/30/24, tolerating room air, doing well. Etiology of hemorrhage possibly amyloid angiopathy.   MRI brain showing small parenchymal hemorrhage right parietal lobe with mild surrounding edema. No underlying lesion. Multifocal areas of hemosiderin compatible with prior microhemorrhages. Multiple chronic lacunar infarcts. I personally reviewed all of the hospital records available to me at this time, which included but are not limited to the discharge summary, labs and imaging reports.   Sicne her discharge she has been feeling headaches daily. She has been having more left arm pain from a shoulder injury after the seizure. She has been complaining of increased fatigue. She snores. She has been complaining of dyspnea on minimal exertion.  She   denies any chest pain, PND, orthopnea, near syncope, syncope, strokelike symptoms.

## 2024-09-03 NOTE — CARDIOLOGY SUMMARY
[de-identified] : 6/2024 Left ventricular cavity is normal in size. Left ventricular systolic function is normal with an ejection fraction visually estimated at 60 to 65 %. LVH. 
[de-identified] : 6/2024 Left ventricular cavity is normal in size. Left ventricular systolic function is normal with an ejection fraction visually estimated at 60 to 65 %. LVH. 
2

## 2024-09-03 NOTE — PHYSICAL EXAM
[Normal Rate] : normal [Rhythm Regular] : regular [Normal S1] : normal S1 [Normal S2] : normal S2 [No Gallop] : no gallop heard [I] : a grade 1 [___ +] : bilateral [unfilled]U+ pretibial pitting edema [2+] : left 2+ [Normal] : alert and oriented, normal memory [Right Carotid Bruit] : no bruit heard over the right carotid [Left Carotid Bruit] : no bruit heard over the left carotid

## 2024-09-03 NOTE — DISCUSSION/SUMMARY
[FreeTextEntry1] :  79 year woman with a history as listed presents for an initial cardiac evaluation.  Brianda is now s/p a recent hospitalization for seizures ICH and hypertensive emergency.  I reviewed her hospital course and testing.  She is still complaining of daily headaches.  Her blood pressure is now on the low side.  At this time I would hold her Lasix therapy.  She will continue with Aldactone 25 mg daily, Norvasc 10 mg daily, Entresto 97/103 every 12, Lopressor 75 mg every 12. She will try to maintain a BP log at home. Reducing dietary salt intake advised.  She may have underlying sleep apnea.  She will get a home sleep study. She needs a ischemic evaluation for restratification.  Most likely she needs a pharmacological nuclear stress test.  At this time I would speak to neurology about her seizures as read adenosine may lower her seizure threshold. speak to neuro about seizure and pharm She will continue with statin therapy to achieve maintain goal LDL<100 or ideally <70.

## 2024-09-04 ENCOUNTER — APPOINTMENT (OUTPATIENT)
Dept: CARDIOLOGY | Facility: CLINIC | Age: 79
End: 2024-09-04

## 2024-09-04 PROCEDURE — 95800 SLP STDY UNATTENDED: CPT | Mod: TC

## 2024-09-06 ENCOUNTER — NON-APPOINTMENT (OUTPATIENT)
Age: 79
End: 2024-09-06

## 2024-09-09 ENCOUNTER — NON-APPOINTMENT (OUTPATIENT)
Age: 79
End: 2024-09-09

## 2024-09-25 ENCOUNTER — APPOINTMENT (OUTPATIENT)
Dept: PULMONOLOGY | Facility: CLINIC | Age: 79
End: 2024-09-25
Payer: MEDICARE

## 2024-09-25 DIAGNOSIS — G47.33 OBSTRUCTIVE SLEEP APNEA (ADULT) (PEDIATRIC): ICD-10-CM

## 2024-09-25 DIAGNOSIS — F51.02 ADJUSTMENT INSOMNIA: ICD-10-CM

## 2024-09-25 DIAGNOSIS — Z91.89 OTHER SPECIFIED PERSONAL RISK FACTORS, NOT ELSEWHERE CLASSIFIED: ICD-10-CM

## 2024-09-25 PROCEDURE — G2211 COMPLEX E/M VISIT ADD ON: CPT

## 2024-09-25 PROCEDURE — 99215 OFFICE O/P EST HI 40 MIN: CPT

## 2024-09-26 PROBLEM — F51.02 TRANSIENT INSOMNIA: Status: ACTIVE | Noted: 2024-09-26

## 2024-09-26 PROBLEM — G47.33 OBSTRUCTIVE SLEEP APNEA, ADULT: Status: ACTIVE | Noted: 2024-09-26

## 2024-09-26 NOTE — REASON FOR VISIT
[Initial Evaluation] : an initial evaluation [Home] : at home, [unfilled] , at the time of the visit. [Medical Office: ([facility])___] : at the medical office located in  [Patient] : the patient [Family Member] : family member [FreeTextEntry2] : sleep apnea

## 2024-09-26 NOTE — HISTORY OF PRESENT ILLNESS
[Snoring] : snoring [Frequent Nocturnal Awakening] : frequent nocturnal awakening [DIS] : DIS [DMS] : DMS [FreeTextEntry1] : 80 yo F PMH HTN, R parietal lobe hematoma, seizures, HLD, CAD s/p PCI 2000, and SDH presenting for evaluation of sleep apnea.  Patient follows with Dr. Yadira Tierney and underwent screening watchPAT HST. Study demonstrated severe KASSI (AHI 75.2/hr T90 0.3%) though with significantly reduced total sleep time of 2h 55mins. Disordered breathing events were comprised primarily of obstructive events though also with mixed/central apneas (m+cAHI 25.1/hr). Of note, patient recently hospitalized at [facility] from 6/29/24 - 7/11/24 for symptoms of AMS/urinary incontinence and found to have R parietal lobe hematoma and seizures requiring intubation.  She reports symptoms of difficulty sleeping since her hospitalization in June. She was initiated on sertraline for the past 4-5 years for anxiety/depression. Previously tried trazodone for 3 days for symptoms of insomnia though experienced nausea, vomiting, and subsequently discontinued. Also tried melatonin without significant benefit. Keppra dose has been reduced as well due to excessive sleepiness. Takes zoloft at night. Does also report shortness of breath prior to stroke that has worsened after hospitalization. Symptoms are present at rest and exertion. Prescribed albuterol and only took 1-3 times without significant benefit.    Goes to bed: 10pm Falls asleep within: 3+ hours Gets out of bed: 730am Nocturnal awakenings: 2-3 times a night Sleeps for 2 hours in the afternoon ____________________________________________________________________ EPWORTH SLEEPINESS SCALE   How likely are you to doze off or fall asleep in the situations described below, in contrast to feeling just tired? This refers to your usual way of life in recent times. Even if you haven't done some of these things recently, try to work out how they would have affected you. Use the following scale to choose one most appropriate number for each situation.   Chance of dozing.............Situation 1........................................Sitting and reading 0........................................Watching TV 0........................................Sitting inactive in a public place (eg a theatre or a meeting) NA........................................As a passenger in a car for an hour without a break 3........................................Lying down to rest in the afternoon when circumstances permit 0........................................Sitting and talking to someone 3........................................Sitting quietly after lunch without alcohol NA........................................In a car, while stopped for a few minutes in traffic   7........................................TOTAL SCORE   0 = Never would doze 1 = Slight chance of dozing 2 = Moderate chance of dozing 3 = High chance of dozing ______________________________________________________________________   [Witnessed Apneas] : no witnessed sleep apnea [Daytime Somnolence] : no daytime somnolence [ESS] : 7

## 2024-09-26 NOTE — HISTORY OF PRESENT ILLNESS
[Snoring] : snoring [Frequent Nocturnal Awakening] : frequent nocturnal awakening [DIS] : DIS [DMS] : DMS [FreeTextEntry1] : 80 yo F PMH HTN, R parietal lobe hematoma, seizures, HLD, CAD s/p PCI 2000, and SDH presenting for evaluation of sleep apnea.  Patient follows with Dr. Yadira Tierney and underwent screening watchPAT HST. Study demonstrated severe KASSI (AHI 75.2/hr T90 0.3%) though with significantly reduced total sleep time of 2h 55mins. Disordered breathing events were comprised primarily of obstructive events though also with mixed/central apneas (m+cAHI 25.1/hr). Of note, patient recently hospitalized at [facility] from 6/29/24 - 7/11/24 for symptoms of AMS/urinary incontinence and found to have R parietal lobe hematoma and seizures requiring intubation.  She reports symptoms of difficulty sleeping since her hospitalization in June. She was initiated on sertraline for the past 4-5 years for anxiety/depression. Previously tried trazodone for 3 days for symptoms of insomnia though experienced nausea, vomiting, and subsequently discontinued. Also tried melatonin without significant benefit. Keppra dose has been reduced as well due to excessive sleepiness. Takes zoloft at night. Does also report shortness of breath prior to stroke that has worsened after hospitalization. Symptoms are present at rest and exertion. Prescribed albuterol and only took 1-3 times without significant benefit.    Goes to bed: 10pm Falls asleep within: 3+ hours Gets out of bed: 730am Nocturnal awakenings: 2-3 times a night Sleeps for 2 hours in the afternoon ____________________________________________________________________ EPWORTH SLEEPINESS SCALE   How likely are you to doze off or fall asleep in the situations described below, in contrast to feeling just tired? This refers to your usual way of life in recent times. Even if you haven't done some of these things recently, try to work out how they would have affected you. Use the following scale to choose one most appropriate number for each situation.   Chance of dozing.............Situation 1........................................Sitting and reading 0........................................Watching TV 0........................................Sitting inactive in a public place (eg a theatre or a meeting) NA........................................As a passenger in a car for an hour without a break 3........................................Lying down to rest in the afternoon when circumstances permit 0........................................Sitting and talking to someone 3........................................Sitting quietly after lunch without alcohol NA........................................In a car, while stopped for a few minutes in traffic   7........................................TOTAL SCORE   0 = Never would doze 1 = Slight chance of dozing 2 = Moderate chance of dozing 3 = High chance of dozing ______________________________________________________________________   [Witnessed Apneas] : no witnessed sleep apnea [Daytime Somnolence] : no daytime somnolence [ESS] : 7

## 2024-09-26 NOTE — REASON FOR VISIT
[Initial Evaluation] : an initial evaluation [Home] : at home, [unfilled] , at the time of the visit. [Medical Office: ([facility])___] : at the medical office located in  [Patient] : the patient [Family Member] : family member [FreeTextEntry2] : sleep apnea

## 2024-09-26 NOTE — ASSESSMENT
[FreeTextEntry1] : 78 yo F PMH HTN c/b seizures/HTNive emergency, HLD, CAD s/p PCI 2000, and SDH presenting for evaluation of sleep apnea.  1) Sleep apnea - She reports symptoms primarily of snoring and daytime fatigue without witnessed apneas. ESS today is 7 though likely underestimated  She underwent watchPAT HST with evidence of severe KASSI comprised primarily of obstructive events with significant degree of mixed and central apneas (overall 4% AHI 75.2/hr, m/cAHI 25.1/hr). However, study also confounded by severely reduced sleep efficiency (41%) and total sleep time of 2h 55mins. T90 during the study was 1.3%. She has been counseled on the health risks associated with KASSI including HTN, cardiovascular disease, arrhythmia, and stroke. Potential therapeutic options have been discussed. Given the concern for false positive HST will refer for confirmatory testing with in-center polysomnogram.   2) Irregular sleep wake cycle - reports inconsistent sleep times since hospitalization in June with difficulty falling asleep, staying asleep, and daytime naps in the afternoon. The importance of sleep hygiene, stimulus control, avoiding prolonged time in bed, and regularizing their sleep schedule was discussed. She is recommended to maintain a consistent bedtime with supplemental melatonin administered at 7pm and consistent AM bright light therapy upon awakening in the morning.   3) Shortness of breath - She reports intermittent shortness of breath since hospitalization that is present at rest and exertion. Prescribed albuterol though only took 1-3 times since prescription without significant benefit. 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 Recommended for ENT follow up post-hospitalization. She will be referred for PFTs for further evaluation of lung function/airway obstruction   Follow up after PSG

## 2024-09-26 NOTE — REASON FOR VISIT
[Initial Evaluation] : an initial evaluation [Home] : at home, [unfilled] , at the time of the visit. [Medical Office: ([facility])___] : at the medical office located in  [Patient] : the patient [Family Member] : family member [FreeTextEntry2] : sleep apnea

## 2024-09-26 NOTE — HISTORY OF PRESENT ILLNESS
[Snoring] : snoring [Frequent Nocturnal Awakening] : frequent nocturnal awakening [DIS] : DIS [DMS] : DMS [FreeTextEntry1] : 78 yo F PMH HTN, R parietal lobe hematoma, seizures, HLD, CAD s/p PCI 2000, and SDH presenting for evaluation of sleep apnea.  Patient follows with Dr. Yadira Tierney and underwent screening watchPAT HST. Study demonstrated severe KASSI (AHI 75.2/hr T90 0.3%) though with significantly reduced total sleep time of 2h 55mins. Disordered breathing events were comprised primarily of obstructive events though also with mixed/central apneas (m+cAHI 25.1/hr). Of note, patient recently hospitalized at [facility] from 6/29/24 - 7/11/24 for symptoms of AMS/urinary incontinence and found to have R parietal lobe hematoma and seizures requiring intubation.  She reports symptoms of difficulty sleeping since her hospitalization in June. She was initiated on sertraline for the past 4-5 years for anxiety/depression. Previously tried trazodone for 3 days for symptoms of insomnia though experienced nausea, vomiting, and subsequently discontinued. Also tried melatonin without significant benefit. Keppra dose has been reduced as well due to excessive sleepiness. Takes zoloft at night. Does also report shortness of breath prior to stroke that has worsened after hospitalization. Symptoms are present at rest and exertion. Prescribed albuterol and only took 1-3 times without significant benefit.    Goes to bed: 10pm Falls asleep within: 3+ hours Gets out of bed: 730am Nocturnal awakenings: 2-3 times a night Sleeps for 2 hours in the afternoon ____________________________________________________________________ EPWORTH SLEEPINESS SCALE   How likely are you to doze off or fall asleep in the situations described below, in contrast to feeling just tired? This refers to your usual way of life in recent times. Even if you haven't done some of these things recently, try to work out how they would have affected you. Use the following scale to choose one most appropriate number for each situation.   Chance of dozing.............Situation 1........................................Sitting and reading 0........................................Watching TV 0........................................Sitting inactive in a public place (eg a theatre or a meeting) NA........................................As a passenger in a car for an hour without a break 3........................................Lying down to rest in the afternoon when circumstances permit 0........................................Sitting and talking to someone 3........................................Sitting quietly after lunch without alcohol NA........................................In a car, while stopped for a few minutes in traffic   7........................................TOTAL SCORE   0 = Never would doze 1 = Slight chance of dozing 2 = Moderate chance of dozing 3 = High chance of dozing ______________________________________________________________________   [Witnessed Apneas] : no witnessed sleep apnea [Daytime Somnolence] : no daytime somnolence [ESS] : 7

## 2024-10-07 ENCOUNTER — APPOINTMENT (OUTPATIENT)
Dept: NEUROLOGY | Facility: CLINIC | Age: 79
End: 2024-10-07
Payer: MEDICARE

## 2024-10-07 VITALS
SYSTOLIC BLOOD PRESSURE: 142 MMHG | HEART RATE: 50 BPM | WEIGHT: 165 LBS | DIASTOLIC BLOOD PRESSURE: 70 MMHG | OXYGEN SATURATION: 97 % | HEIGHT: 62 IN | BODY MASS INDEX: 30.36 KG/M2

## 2024-10-07 PROCEDURE — G2211 COMPLEX E/M VISIT ADD ON: CPT

## 2024-10-07 PROCEDURE — 99214 OFFICE O/P EST MOD 30 MIN: CPT

## 2024-10-07 RX ORDER — DONEPEZIL HYDROCHLORIDE 10 MG/1
10 TABLET, FILM COATED ORAL
Refills: 0 | Status: ACTIVE | COMMUNITY

## 2024-10-30 ENCOUNTER — NON-APPOINTMENT (OUTPATIENT)
Age: 79
End: 2024-10-30

## 2024-10-30 ENCOUNTER — APPOINTMENT (OUTPATIENT)
Dept: CARDIOLOGY | Facility: CLINIC | Age: 79
End: 2024-10-30
Payer: MEDICARE

## 2024-10-30 VITALS
DIASTOLIC BLOOD PRESSURE: 71 MMHG | WEIGHT: 164 LBS | BODY MASS INDEX: 30.18 KG/M2 | HEART RATE: 43 BPM | HEIGHT: 62 IN | SYSTOLIC BLOOD PRESSURE: 145 MMHG | OXYGEN SATURATION: 92 %

## 2024-10-30 VITALS — SYSTOLIC BLOOD PRESSURE: 110 MMHG | DIASTOLIC BLOOD PRESSURE: 70 MMHG

## 2024-10-30 PROCEDURE — 99214 OFFICE O/P EST MOD 30 MIN: CPT

## 2024-10-30 PROCEDURE — 93000 ELECTROCARDIOGRAM COMPLETE: CPT

## 2024-10-30 PROCEDURE — G2211 COMPLEX E/M VISIT ADD ON: CPT

## 2024-12-09 ENCOUNTER — NON-APPOINTMENT (OUTPATIENT)
Age: 79
End: 2024-12-09

## 2024-12-09 ENCOUNTER — APPOINTMENT (OUTPATIENT)
Dept: NEUROLOGY | Facility: CLINIC | Age: 79
End: 2024-12-09
Payer: MEDICARE

## 2024-12-09 PROCEDURE — 95816 EEG AWAKE AND DROWSY: CPT

## 2024-12-09 PROCEDURE — 93040 RHYTHM ECG WITH REPORT: CPT

## 2025-01-09 ENCOUNTER — APPOINTMENT (OUTPATIENT)
Dept: NEUROLOGY | Facility: CLINIC | Age: 80
End: 2025-01-09
Payer: MEDICARE

## 2025-01-09 VITALS
SYSTOLIC BLOOD PRESSURE: 136 MMHG | HEART RATE: 63 BPM | OXYGEN SATURATION: 95 % | DIASTOLIC BLOOD PRESSURE: 60 MMHG | WEIGHT: 164 LBS | HEIGHT: 62 IN | BODY MASS INDEX: 30.18 KG/M2

## 2025-01-09 DIAGNOSIS — R41.89 OTHER SYMPTOMS AND SIGNS INVOLVING COGNITIVE FUNCTIONS AND AWARENESS: ICD-10-CM

## 2025-01-09 DIAGNOSIS — R42 DIZZINESS AND GIDDINESS: ICD-10-CM

## 2025-01-09 PROCEDURE — G2211 COMPLEX E/M VISIT ADD ON: CPT

## 2025-01-09 PROCEDURE — 99214 OFFICE O/P EST MOD 30 MIN: CPT

## 2025-01-09 RX ORDER — MEMANTINE HYDROCHLORIDE 5 MG/1
5 TABLET, FILM COATED ORAL DAILY
Qty: 30 | Refills: 2 | Status: ACTIVE | COMMUNITY
Start: 2025-01-09 | End: 1900-01-01

## 2025-01-09 RX ORDER — MECLIZINE HYDROCHLORIDE 25 MG/1
25 TABLET ORAL
Refills: 0 | Status: ACTIVE | COMMUNITY

## 2025-01-15 ENCOUNTER — NON-APPOINTMENT (OUTPATIENT)
Age: 80
End: 2025-01-15

## 2025-01-17 ENCOUNTER — APPOINTMENT (OUTPATIENT)
Dept: CARDIOLOGY | Facility: CLINIC | Age: 80
End: 2025-01-17

## 2025-01-17 PROCEDURE — 78452 HT MUSCLE IMAGE SPECT MULT: CPT

## 2025-01-17 PROCEDURE — A9500: CPT

## 2025-01-17 PROCEDURE — 93015 CV STRESS TEST SUPVJ I&R: CPT

## 2025-02-03 ENCOUNTER — RX CHANGE (OUTPATIENT)
Age: 80
End: 2025-02-03

## 2025-02-03 RX ORDER — MEMANTINE HYDROCHLORIDE 5 MG/1
5 TABLET, FILM COATED ORAL
Qty: 90 | Refills: 1 | Status: ACTIVE | COMMUNITY
Start: 1900-01-01 | End: 1900-01-01

## 2025-03-18 ENCOUNTER — NON-APPOINTMENT (OUTPATIENT)
Age: 80
End: 2025-03-18

## 2025-03-18 ENCOUNTER — APPOINTMENT (OUTPATIENT)
Dept: CARDIOLOGY | Facility: CLINIC | Age: 80
End: 2025-03-18
Payer: MEDICARE

## 2025-03-18 VITALS
WEIGHT: 177 LBS | SYSTOLIC BLOOD PRESSURE: 157 MMHG | BODY MASS INDEX: 32.57 KG/M2 | HEART RATE: 55 BPM | DIASTOLIC BLOOD PRESSURE: 94 MMHG | HEIGHT: 62 IN | OXYGEN SATURATION: 95 %

## 2025-03-18 VITALS — DIASTOLIC BLOOD PRESSURE: 60 MMHG | SYSTOLIC BLOOD PRESSURE: 122 MMHG

## 2025-03-18 DIAGNOSIS — E87.70 FLUID OVERLOAD, UNSPECIFIED: ICD-10-CM

## 2025-03-18 DIAGNOSIS — I50.30 UNSPECIFIED DIASTOLIC (CONGESTIVE) HEART FAILURE: ICD-10-CM

## 2025-03-18 PROCEDURE — 93000 ELECTROCARDIOGRAM COMPLETE: CPT

## 2025-03-18 PROCEDURE — G2211 COMPLEX E/M VISIT ADD ON: CPT

## 2025-03-18 PROCEDURE — 99214 OFFICE O/P EST MOD 30 MIN: CPT

## 2025-03-18 RX ORDER — FUROSEMIDE 20 MG/1
20 TABLET ORAL DAILY
Qty: 90 | Refills: 3 | Status: ACTIVE | COMMUNITY
Start: 2025-03-18 | End: 1900-01-01

## 2025-03-19 ENCOUNTER — NON-APPOINTMENT (OUTPATIENT)
Age: 80
End: 2025-03-19

## 2025-05-13 ENCOUNTER — APPOINTMENT (OUTPATIENT)
Dept: NEUROLOGY | Facility: CLINIC | Age: 80
End: 2025-05-13

## 2025-05-29 ENCOUNTER — APPOINTMENT (OUTPATIENT)
Dept: NEUROLOGY | Facility: CLINIC | Age: 80
End: 2025-05-29
Payer: MEDICARE

## 2025-05-29 VITALS
SYSTOLIC BLOOD PRESSURE: 152 MMHG | DIASTOLIC BLOOD PRESSURE: 60 MMHG | BODY MASS INDEX: 32.57 KG/M2 | WEIGHT: 177 LBS | HEART RATE: 62 BPM | OXYGEN SATURATION: 96 % | HEIGHT: 62 IN

## 2025-05-29 PROCEDURE — G2211 COMPLEX E/M VISIT ADD ON: CPT

## 2025-05-29 PROCEDURE — 99214 OFFICE O/P EST MOD 30 MIN: CPT

## 2025-06-17 ENCOUNTER — APPOINTMENT (OUTPATIENT)
Dept: CARDIOLOGY | Facility: CLINIC | Age: 80
End: 2025-06-17
Payer: MEDICARE

## 2025-06-17 VITALS
OXYGEN SATURATION: 95 % | SYSTOLIC BLOOD PRESSURE: 119 MMHG | HEIGHT: 62 IN | DIASTOLIC BLOOD PRESSURE: 78 MMHG | WEIGHT: 174 LBS | HEART RATE: 63 BPM | BODY MASS INDEX: 32.02 KG/M2

## 2025-06-17 PROCEDURE — G2211 COMPLEX E/M VISIT ADD ON: CPT

## 2025-06-17 PROCEDURE — 99214 OFFICE O/P EST MOD 30 MIN: CPT

## 2025-06-17 PROCEDURE — 93000 ELECTROCARDIOGRAM COMPLETE: CPT

## 2025-07-18 ENCOUNTER — APPOINTMENT (OUTPATIENT)
Dept: CARDIOLOGY | Facility: CLINIC | Age: 80
End: 2025-07-18
Payer: MEDICARE

## 2025-07-18 PROCEDURE — 93306 TTE W/DOPPLER COMPLETE: CPT

## 2025-08-07 ENCOUNTER — RX RENEWAL (OUTPATIENT)
Age: 80
End: 2025-08-07

## 2025-09-02 ENCOUNTER — APPOINTMENT (OUTPATIENT)
Dept: NEUROLOGY | Facility: CLINIC | Age: 80
End: 2025-09-02
Payer: MEDICARE

## 2025-09-02 VITALS
DIASTOLIC BLOOD PRESSURE: 62 MMHG | SYSTOLIC BLOOD PRESSURE: 108 MMHG | HEIGHT: 62 IN | OXYGEN SATURATION: 95 % | HEART RATE: 58 BPM | BODY MASS INDEX: 30.91 KG/M2 | WEIGHT: 168 LBS

## 2025-09-02 DIAGNOSIS — Z78.9 OTHER SPECIFIED HEALTH STATUS: ICD-10-CM

## 2025-09-02 PROCEDURE — G2211 COMPLEX E/M VISIT ADD ON: CPT

## 2025-09-02 PROCEDURE — 99214 OFFICE O/P EST MOD 30 MIN: CPT
